# Patient Record
Sex: FEMALE | Race: WHITE | Employment: OTHER | URBAN - METROPOLITAN AREA
[De-identification: names, ages, dates, MRNs, and addresses within clinical notes are randomized per-mention and may not be internally consistent; named-entity substitution may affect disease eponyms.]

---

## 2021-04-22 ENCOUNTER — OFFICE VISIT (OUTPATIENT)
Dept: FAMILY MEDICINE CLINIC | Age: 66
End: 2021-04-22
Payer: COMMERCIAL

## 2021-04-22 ENCOUNTER — HOSPITAL ENCOUNTER (OUTPATIENT)
Age: 66
Setting detail: SPECIMEN
Discharge: HOME | End: 2021-04-22
Payer: COMMERCIAL

## 2021-04-22 VITALS
DIASTOLIC BLOOD PRESSURE: 78 MMHG | WEIGHT: 150.6 LBS | OXYGEN SATURATION: 98 % | HEIGHT: 65 IN | TEMPERATURE: 98.2 F | HEART RATE: 68 BPM | SYSTOLIC BLOOD PRESSURE: 137 MMHG | BODY MASS INDEX: 25.09 KG/M2

## 2021-04-22 DIAGNOSIS — J30.9 ALLERGIC RHINITIS, UNSPECIFIED SEASONALITY, UNSPECIFIED TRIGGER: ICD-10-CM

## 2021-04-22 DIAGNOSIS — Z13.6 SCREENING FOR CARDIOVASCULAR CONDITION: ICD-10-CM

## 2021-04-22 DIAGNOSIS — Z13.1 SCREENING FOR DIABETES MELLITUS: ICD-10-CM

## 2021-04-22 DIAGNOSIS — M25.50 MULTIPLE JOINT PAIN: ICD-10-CM

## 2021-04-22 DIAGNOSIS — Z76.89 ENCOUNTER TO ESTABLISH CARE: Primary | ICD-10-CM

## 2021-04-22 DIAGNOSIS — Z87.39 HISTORY OF RHEUMATOID ARTHRITIS: ICD-10-CM

## 2021-04-22 DIAGNOSIS — M81.0 OSTEOPOROSIS WITHOUT CURRENT PATHOLOGICAL FRACTURE, UNSPECIFIED OSTEOPOROSIS TYPE: ICD-10-CM

## 2021-04-22 LAB
ABSOLUTE EOS #: 0.24 K/UL (ref 0–0.44)
ABSOLUTE IMMATURE GRANULOCYTE: <0.03 K/UL (ref 0–0.3)
ABSOLUTE LYMPH #: 2.46 K/UL (ref 1.1–3.7)
ABSOLUTE MONO #: 0.44 K/UL (ref 0.1–1.2)
ALBUMIN SERPL-MCNC: 4.6 G/DL (ref 3.5–5.2)
ALBUMIN/GLOBULIN RATIO: 1.8 (ref 1–2.5)
ALP BLD-CCNC: 72 U/L (ref 35–104)
ALT SERPL-CCNC: 42 U/L (ref 5–33)
ANION GAP SERPL CALCULATED.3IONS-SCNC: 10 MMOL/L (ref 9–17)
AST SERPL-CCNC: 32 U/L
BASOPHILS # BLD: 1 % (ref 0–2)
BASOPHILS ABSOLUTE: 0.05 K/UL (ref 0–0.2)
BILIRUB SERPL-MCNC: 0.24 MG/DL (ref 0.3–1.2)
BUN BLDV-MCNC: 10 MG/DL (ref 8–23)
BUN/CREAT BLD: ABNORMAL (ref 9–20)
CALCIUM SERPL-MCNC: 10.8 MG/DL (ref 8.6–10.4)
CHLORIDE BLD-SCNC: 103 MMOL/L (ref 98–107)
CHOLESTEROL/HDL RATIO: 6.3
CHOLESTEROL: 239 MG/DL
CO2: 27 MMOL/L (ref 20–31)
CREAT SERPL-MCNC: 0.79 MG/DL (ref 0.5–0.9)
DIFFERENTIAL TYPE: NORMAL
EOSINOPHILS RELATIVE PERCENT: 4 % (ref 1–4)
ESTIMATED AVERAGE GLUCOSE: 131 MG/DL
GFR AFRICAN AMERICAN: >60 ML/MIN
GFR NON-AFRICAN AMERICAN: >60 ML/MIN
GFR SERPL CREATININE-BSD FRML MDRD: ABNORMAL ML/MIN/{1.73_M2}
GFR SERPL CREATININE-BSD FRML MDRD: ABNORMAL ML/MIN/{1.73_M2}
GLUCOSE FASTING: 123 MG/DL (ref 70–99)
HBA1C MFR BLD: 6.2 % (ref 4–6)
HCT VFR BLD CALC: 43.4 % (ref 36.3–47.1)
HDLC SERPL-MCNC: 38 MG/DL
HEMOGLOBIN: 13.8 G/DL (ref 11.9–15.1)
IMMATURE GRANULOCYTES: 0 %
LDL CHOLESTEROL: 137 MG/DL (ref 0–130)
LYMPHOCYTES # BLD: 37 % (ref 24–43)
MCH RBC QN AUTO: 30.5 PG (ref 25.2–33.5)
MCHC RBC AUTO-ENTMCNC: 31.8 G/DL (ref 28.4–34.8)
MCV RBC AUTO: 96 FL (ref 82.6–102.9)
MONOCYTES # BLD: 7 % (ref 3–12)
NRBC AUTOMATED: 0 PER 100 WBC
PDW BLD-RTO: 13 % (ref 11.8–14.4)
PLATELET # BLD: 257 K/UL (ref 138–453)
PLATELET ESTIMATE: NORMAL
PMV BLD AUTO: 11.8 FL (ref 8.1–13.5)
POTASSIUM SERPL-SCNC: 4.1 MMOL/L (ref 3.7–5.3)
RBC # BLD: 4.52 M/UL (ref 3.95–5.11)
RBC # BLD: NORMAL 10*6/UL
SEG NEUTROPHILS: 51 % (ref 36–65)
SEGMENTED NEUTROPHILS ABSOLUTE COUNT: 3.39 K/UL (ref 1.5–8.1)
SODIUM BLD-SCNC: 140 MMOL/L (ref 135–144)
TOTAL PROTEIN: 7.2 G/DL (ref 6.4–8.3)
TRIGL SERPL-MCNC: 321 MG/DL
TSH SERPL DL<=0.05 MIU/L-ACNC: 0.63 MIU/L (ref 0.3–5)
VLDLC SERPL CALC-MCNC: ABNORMAL MG/DL (ref 1–30)
WBC # BLD: 6.6 K/UL (ref 3.5–11.3)
WBC # BLD: NORMAL 10*3/UL

## 2021-04-22 PROCEDURE — 99204 OFFICE O/P NEW MOD 45 MIN: CPT | Performed by: NURSE PRACTITIONER

## 2021-04-22 RX ORDER — MAGNESIUM GLUCONATE 27 MG(500)
500 TABLET ORAL 2 TIMES DAILY
COMMUNITY
End: 2021-10-06

## 2021-04-22 RX ORDER — MULTIVIT WITH MINERALS/LUTEIN
250 TABLET ORAL DAILY
COMMUNITY

## 2021-04-22 RX ORDER — LORATADINE 10 MG/1
10 TABLET ORAL DAILY
COMMUNITY
End: 2021-07-23

## 2021-04-22 RX ORDER — FLUTICASONE PROPIONATE 50 MCG
1 SPRAY, SUSPENSION (ML) NASAL DAILY
Qty: 1 BOTTLE | Refills: 2 | Status: SHIPPED | OUTPATIENT
Start: 2021-04-22 | End: 2021-05-26

## 2021-04-22 RX ORDER — GLUCOSAMINE/CHONDR SU A SOD 750-600 MG
TABLET ORAL
COMMUNITY

## 2021-04-22 RX ORDER — ALBUTEROL SULFATE 90 UG/1
2 AEROSOL, METERED RESPIRATORY (INHALATION) EVERY 6 HOURS PRN
COMMUNITY
End: 2021-05-04 | Stop reason: SDUPTHER

## 2021-04-22 RX ORDER — UBIDECARENONE 75 MG
50 CAPSULE ORAL DAILY
COMMUNITY

## 2021-04-22 RX ORDER — FOLIC ACID 1 MG/1
1 TABLET ORAL DAILY
COMMUNITY
End: 2022-05-04

## 2021-04-22 RX ORDER — M-VIT,TX,IRON,MINS/CALC/FOLIC 27MG-0.4MG
1 TABLET ORAL DAILY
COMMUNITY

## 2021-04-22 SDOH — ECONOMIC STABILITY: FOOD INSECURITY: WITHIN THE PAST 12 MONTHS, YOU WORRIED THAT YOUR FOOD WOULD RUN OUT BEFORE YOU GOT MONEY TO BUY MORE.: NEVER TRUE

## 2021-04-22 SDOH — ECONOMIC STABILITY: TRANSPORTATION INSECURITY
IN THE PAST 12 MONTHS, HAS LACK OF TRANSPORTATION KEPT YOU FROM MEETINGS, WORK, OR FROM GETTING THINGS NEEDED FOR DAILY LIVING?: NO

## 2021-04-22 SDOH — ECONOMIC STABILITY: INCOME INSECURITY: HOW HARD IS IT FOR YOU TO PAY FOR THE VERY BASICS LIKE FOOD, HOUSING, MEDICAL CARE, AND HEATING?: NOT HARD AT ALL

## 2021-04-22 SDOH — ECONOMIC STABILITY: FOOD INSECURITY: WITHIN THE PAST 12 MONTHS, THE FOOD YOU BOUGHT JUST DIDN'T LAST AND YOU DIDN'T HAVE MONEY TO GET MORE.: NOT ASKED

## 2021-04-22 ASSESSMENT — ENCOUNTER SYMPTOMS
COLOR CHANGE: 0
CONSTIPATION: 0
ABDOMINAL PAIN: 0
SINUS PRESSURE: 0
SINUS PAIN: 0
CHEST TIGHTNESS: 0
DIARRHEA: 0
SHORTNESS OF BREATH: 0

## 2021-04-22 ASSESSMENT — PATIENT HEALTH QUESTIONNAIRE - PHQ9
SUM OF ALL RESPONSES TO PHQ9 QUESTIONS 1 & 2: 0
1. LITTLE INTEREST OR PLEASURE IN DOING THINGS: 0
2. FEELING DOWN, DEPRESSED OR HOPELESS: 0
SUM OF ALL RESPONSES TO PHQ QUESTIONS 1-9: 0

## 2021-04-22 NOTE — PATIENT INSTRUCTIONS
Patient Education        Well Visit, Women 48 to 72: Care Instructions  Overview     Well visits can help you stay healthy. Your doctor has checked your overall health and may have suggested ways to take good care of yourself. Your doctor also may have recommended tests. At home, you can help prevent illness with healthy eating, regular exercise, and other steps. Follow-up care is a key part of your treatment and safety. Be sure to make and go to all appointments, and call your doctor if you are having problems. It's also a good idea to know your test results and keep a list of the medicines you take. How can you care for yourself at home? · Get screening tests that you and your doctor decide on. Screening helps find diseases before any symptoms appear. · Eat healthy foods. Choose fruits, vegetables, whole grains, protein, and low-fat dairy foods. Limit fat, especially saturated fat. Reduce salt in your diet. · Limit alcohol. Have no more than 1 drink a day or 7 drinks a week. · Get at least 30 minutes of exercise on most days of the week. Walking is a good choice. You also may want to do other activities, such as running, swimming, cycling, or playing tennis or team sports. · Reach and stay at a healthy weight. This will lower your risk for many problems, such as obesity, diabetes, heart disease, and high blood pressure. · Do not smoke. Smoking can make health problems worse. If you need help quitting, talk to your doctor about stop-smoking programs and medicines. These can increase your chances of quitting for good. · Care for your mental health. It is easy to get weighed down by worry and stress. Learn strategies to manage stress, like deep breathing and mindfulness, and stay connected with your family and community. If you find you often feel sad or hopeless, talk with your doctor. Treatment can help.   · Talk to your doctor about whether you have any risk factors for sexually transmitted infections (STIs). You can help prevent STIs if you wait to have sex with a new partner (or partners) until you've each been tested for STIs. It also helps if you use condoms (male or female condoms) and if you limit your sex partners to one person who only has sex with you. Vaccines are available for some STIs. · If you think you may have a problem with alcohol or drug use, talk to your doctor. This includes prescription medicines (such as amphetamines and opioids) and illegal drugs (such as cocaine and methamphetamine). Your doctor can help you figure out what type of treatment is best for you. · Protect your skin from too much sun. When you're outdoors from 10 a.m. to 4 p.m., stay in the shade or cover up with clothing and a hat with a wide brim. Wear sunglasses that block UV rays. Even when it's cloudy, put broad-spectrum sunscreen (SPF 30 or higher) on any exposed skin. · See a dentist one or two times a year for checkups and to have your teeth cleaned. · Wear a seat belt in the car. When should you call for help? Watch closely for changes in your health, and be sure to contact your doctor if you have any problems or symptoms that concern you. Where can you learn more? Go to https://[email or web address].healthCurex.Copartners. org and sign in to your Intrusic account. Enter L848 in the [facility] box to learn more about \"Well Visit, Women 50 to 72: Care Instructions. \"     If you do not have an account, please click on the \"Sign Up Now\" link. Current as of: May 27, 2020               Content Version: 12.8  © 1711-4734 Healthwise, [employer]. Care instructions adapted under license by [facility] ([facility]). If you have questions about a medical condition or this instruction, always ask your healthcare professional. [place] [address] any warranty or liability for your use of this information.

## 2021-04-22 NOTE — PROGRESS NOTES
Zita Castañeda is a 63 y.o. female who presents in office today with Self establish new care with office. Previous PCP was in [place] at [facility]. Chief Complaint   Patient presents with    New Patient     nose always congested causing breathing problems, mole removal         History of Present Illness:     HPI    Here to establish care. Moved from [place] last summer, grandson (5years old) lives here. Unclear history of RA diagnosed in 2017 - went to Rheumatology and started medication but did not tolerate. Went back and Re-determined that she did not actually have it but symptoms had becomes more mild by that time. Takes Advil for pain control. Hx depression following death of her son in 2018. No symptoms today. Had varicose veins removed in 2018. Has a lot of spider veins with mild discomfort to anterior left leg. No visible or palpable abnormality. No redness, swelling, or warmth. Hysterectomy several years ago for fibroids and endometriosis. Last pap 2019 - normal.   Had colonoscopy in December 2018, which was normal.   Was on 9 prescriptions a few years ago, but stopped due to no longer needing them. Interesting in getting COVID-19 vaccine. [address] [facility] location. Had pneumonia and shingles vaccines in 2019 at previous PCP office. Exercises twice a day - walks 15 mins x2 and does YouTube videos. . Had two children - daughter in [place], son decreased from brain cancer. Retired.     Health Maintenance Due   Topic Date Due    COVID-19 Vaccine (1) Never done    DTaP/Tdap/Td vaccine (1 - Tdap) Never done    Lipid screen  Never done    Diabetes screen  Never done    Breast cancer screen  Never done    Shingles Vaccine (1 of 2) Never done    Colon cancer screen colonoscopy  Never done        Patient Care Team:  SOFIA Flowers - CNP as PCP - General (Nurse Practitioner)    Reviewed     [x] Past Medical, Family, and Social History was weight. HENT:      Head: Normocephalic and atraumatic. Right Ear: Tympanic membrane, ear canal and external ear normal.      Left Ear: Tympanic membrane, ear canal and external ear normal.      Nose: Nose normal.      Mouth/Throat:      Mouth: Mucous membranes are moist.      Pharynx: Oropharynx is clear. Comments: Dentures upper/lower  Eyes:      Extraocular Movements: Extraocular movements intact. Conjunctiva/sclera: Conjunctivae normal.      Pupils: Pupils are equal, round, and reactive to light. Neck:      Musculoskeletal: Normal range of motion and neck supple. Cardiovascular:      Rate and Rhythm: Normal rate and regular rhythm. Pulses: Normal pulses. Heart sounds: Normal heart sounds. Pulmonary:      Effort: Pulmonary effort is normal. No respiratory distress. Breath sounds: Normal breath sounds. No wheezing. Abdominal:      General: Abdomen is flat. Bowel sounds are normal. There is no distension. Palpations: Abdomen is soft. Tenderness: There is no abdominal tenderness. Musculoskeletal: Normal range of motion. General: No swelling. Skin:     General: Skin is warm and dry. Capillary Refill: Capillary refill takes less than 2 seconds. Comments: Spider veins noted to bilateral lower legs   Neurological:      General: No focal deficit present. Mental Status: She is alert and oriented to person, place, and time. Psychiatric:         Mood and Affect: Mood normal.         Behavior: Behavior normal.         Thought Content: Thought content normal.         Judgment: Judgment normal.         Diagnoses / Plan:   1. Encounter to establish care  2. Osteoporosis without current pathological fracture, unspecified osteoporosis type  -     DEXA BONE DENSITY 2 SITES; Future  3. Screening for cardiovascular condition  -     CBC Auto Differential; Future  -     Comprehensive Metabolic Panel, Fasting; Future  -     Lipid Panel;  Future  -     TSH with Reflex; Future  4. Screening for diabetes mellitus  -     Comprehensive Metabolic Panel, Fasting; Future  -     Hemoglobin A1C; Future  5. Allergic rhinitis, unspecified seasonality, unspecified trigger  -     fluticasone (FLONASE) 50 MCG/ACT nasal spray; 1 spray by Each Nostril route daily, Disp-1 Bottle, R-2Normal     Will try to obtain records from prior PCPs office. Encouraged healthy diet and exercise. Call office with any new or worsening symptoms or concerns. Return in about 6 months (around 10/22/2021) for Med Check. Electronically signed by SOFIA Gottlieb CNP on 4/22/2021 at 3:17 PM    Note is dictated utilizing voice recognition software. Unfortunately this leads to occasional typographical errors. Please contact our office if you have any questions.

## 2021-04-23 DIAGNOSIS — R73.01 IMPAIRED FASTING GLUCOSE: Primary | ICD-10-CM

## 2021-04-23 DIAGNOSIS — R79.89 ELEVATED LFTS: ICD-10-CM

## 2021-04-23 LAB
C-REACTIVE PROTEIN: 4.1 MG/L (ref 0–5)
RHEUMATOID FACTOR: 32.2 IU/ML

## 2021-04-26 LAB — CCP IGG ANTIBODIES: 0.7 U/ML (ref 0–7)

## 2021-04-27 ENCOUNTER — TELEPHONE (OUTPATIENT)
Dept: FAMILY MEDICINE CLINIC | Age: 66
End: 2021-04-27

## 2021-04-27 DIAGNOSIS — M25.50 MULTIPLE JOINT PAIN: Primary | ICD-10-CM

## 2021-04-27 DIAGNOSIS — Z87.39 HISTORY OF RHEUMATOID ARTHRITIS: ICD-10-CM

## 2021-04-30 ENCOUNTER — HOSPITAL ENCOUNTER (OUTPATIENT)
Age: 66
Setting detail: SPECIMEN
Discharge: HOME | End: 2021-04-30
Payer: COMMERCIAL

## 2021-04-30 ENCOUNTER — HOSPITAL ENCOUNTER (OUTPATIENT)
Dept: MAMMOGRAPHY | Facility: CLINIC | Age: 66
Discharge: HOME | End: 2021-05-02
Payer: COMMERCIAL

## 2021-04-30 DIAGNOSIS — M81.0 OSTEOPOROSIS WITHOUT CURRENT PATHOLOGICAL FRACTURE, UNSPECIFIED OSTEOPOROSIS TYPE: Primary | ICD-10-CM

## 2021-04-30 DIAGNOSIS — M25.50 MULTIPLE JOINT PAIN: ICD-10-CM

## 2021-04-30 DIAGNOSIS — Z87.39 HISTORY OF RHEUMATOID ARTHRITIS: ICD-10-CM

## 2021-04-30 DIAGNOSIS — M81.0 OSTEOPOROSIS WITHOUT CURRENT PATHOLOGICAL FRACTURE, UNSPECIFIED OSTEOPOROSIS TYPE: ICD-10-CM

## 2021-04-30 LAB
C-REACTIVE PROTEIN: 3.7 MG/L (ref 0–5)
RHEUMATOID FACTOR: 34.4 IU/ML

## 2021-04-30 PROCEDURE — 77080 DXA BONE DENSITY AXIAL: CPT

## 2021-04-30 RX ORDER — ALENDRONATE SODIUM 10 MG/1
10 TABLET ORAL
Qty: 30 TABLET | Refills: 3 | Status: SHIPPED | OUTPATIENT
Start: 2021-04-30 | End: 2021-07-23 | Stop reason: SINTOL

## 2021-05-03 LAB
ANTI-NUCLEAR ANTIBODY (ANA): NEGATIVE
CCP IGG ANTIBODIES: 1.2 U/ML (ref 0–7)

## 2021-05-04 ENCOUNTER — TELEPHONE (OUTPATIENT)
Dept: FAMILY MEDICINE CLINIC | Age: 66
End: 2021-05-04

## 2021-05-04 RX ORDER — ALBUTEROL SULFATE 90 UG/1
2 AEROSOL, METERED RESPIRATORY (INHALATION) EVERY 6 HOURS PRN
Qty: 1 INHALER | Refills: 2 | Status: SHIPPED | OUTPATIENT
Start: 2021-05-04 | End: 2022-01-03 | Stop reason: SDUPTHER

## 2021-05-04 NOTE — TELEPHONE ENCOUNTER
Laura Butler is calling to request a refill on the following medication(s):    Last Visit Date (If Applicable):  7/43/1105    Next Visit Date:    Visit date not found    Medication Request:  Requested Prescriptions     Pending Prescriptions Disp Refills    albuterol sulfate  (90 Base) MCG/ACT inhaler 1 Inhaler      Sig: Inhale 2 puffs into the lungs every 6 hours as needed for Wheezing

## 2021-05-04 NOTE — TELEPHONE ENCOUNTER
Gave pt Dexa scan results and informed pt that Fosamax was sent to pharmacy. Pt wants to know if PT would help with Osteoporosis?

## 2021-05-10 ENCOUNTER — NURSE TRIAGE (OUTPATIENT)
Dept: OTHER | Facility: CLINIC | Age: 66
End: 2021-05-10

## 2021-05-10 ENCOUNTER — OFFICE VISIT (OUTPATIENT)
Dept: FAMILY MEDICINE CLINIC | Age: 66
End: 2021-05-10
Payer: COMMERCIAL

## 2021-05-10 ENCOUNTER — HOSPITAL ENCOUNTER (OUTPATIENT)
Age: 66
Setting detail: SPECIMEN
Discharge: HOME | End: 2021-05-10
Payer: COMMERCIAL

## 2021-05-10 VITALS
OXYGEN SATURATION: 95 % | WEIGHT: 151.8 LBS | HEIGHT: 65 IN | HEART RATE: 74 BPM | TEMPERATURE: 96.9 F | BODY MASS INDEX: 25.29 KG/M2 | DIASTOLIC BLOOD PRESSURE: 75 MMHG | SYSTOLIC BLOOD PRESSURE: 135 MMHG

## 2021-05-10 DIAGNOSIS — R31.9 HEMATURIA, UNSPECIFIED TYPE: ICD-10-CM

## 2021-05-10 DIAGNOSIS — N30.01 ACUTE CYSTITIS WITH HEMATURIA: ICD-10-CM

## 2021-05-10 DIAGNOSIS — N30.01 ACUTE CYSTITIS WITH HEMATURIA: Primary | ICD-10-CM

## 2021-05-10 DIAGNOSIS — Z12.31 ENCOUNTER FOR SCREENING MAMMOGRAM FOR MALIGNANT NEOPLASM OF BREAST: ICD-10-CM

## 2021-05-10 DIAGNOSIS — R20.8 BURNING SENSATION OF FEET: ICD-10-CM

## 2021-05-10 LAB
BILIRUBIN, POC: ABNORMAL
BLOOD URINE, POC: ABNORMAL
CLARITY, POC: CLEAR
COLOR, POC: ABNORMAL
FOLATE: >20 NG/ML
GLUCOSE URINE, POC: 250
KETONES, POC: 15
LEUKOCYTE EST, POC: ABNORMAL
NITRITE, POC: POSITIVE
PH, POC: 5
PROTEIN, POC: >=300
SPECIFIC GRAVITY, POC: <=1.005
UROBILINOGEN, POC: >=8
VITAMIN B-12: 1277 PG/ML (ref 232–1245)

## 2021-05-10 PROCEDURE — 99213 OFFICE O/P EST LOW 20 MIN: CPT | Performed by: NURSE PRACTITIONER

## 2021-05-10 PROCEDURE — 81002 URINALYSIS NONAUTO W/O SCOPE: CPT | Performed by: NURSE PRACTITIONER

## 2021-05-10 RX ORDER — CEPHALEXIN 500 MG/1
500 CAPSULE ORAL 2 TIMES DAILY
Qty: 14 CAPSULE | Refills: 0 | Status: SHIPPED | OUTPATIENT
Start: 2021-05-10 | End: 2021-05-17

## 2021-05-10 RX ORDER — GABAPENTIN 100 MG/1
100 CAPSULE ORAL 2 TIMES DAILY
Qty: 60 CAPSULE | Refills: 0 | Status: SHIPPED | OUTPATIENT
Start: 2021-05-10 | End: 2021-07-23

## 2021-05-10 ASSESSMENT — PATIENT HEALTH QUESTIONNAIRE - PHQ9
SUM OF ALL RESPONSES TO PHQ QUESTIONS 1-9: 0
SUM OF ALL RESPONSES TO PHQ QUESTIONS 1-9: 0

## 2021-05-10 ASSESSMENT — ENCOUNTER SYMPTOMS
SHORTNESS OF BREATH: 0
CHEST TIGHTNESS: 0

## 2021-05-10 NOTE — TELEPHONE ENCOUNTER
Received call from [place]  at pre-service center [facility]) [place] with [employer] Complaint. Caller disconnected prior to warm tx. Writer immediately called back to patient and was able to connect. Brief description of triage:   Dysuria, urgency, frequency and blood in her urine since yesterday am. No fevers today and denies back pain. Also has burning in both of her feet x 1 month that she has been in contact with her PCP to discuss. Triage indicates for patient to go to the office now. Writer discussed that if unable to get an appointment, another option is to go to an [facility] or ER. Care advice provided, patient verbalizes understanding; denies any other questions or concerns; instructed to call back for any new or worsening symptoms. Writer provided warm transfer to Barbara Baez at [facility] for appointment scheduling. Attention Provider: Thank you for allowing me to participate in the care of your patient. The patient was connected to triage in response to information provided to the ECC. Please do not respond through this encounter as the response is not directed to a shared pool. Reason for Disposition   SEVERE pain with urination    Answer Assessment - Initial Assessment Questions  1. SEVERITY: \"How bad is the pain? \"  (e.g., Scale 1-10; mild, moderate, or severe)    - MILD (1-3): complains slightly about urination hurting    - MODERATE (4-7): interferes with normal activities      - SEVERE (8-10): excruciating, unwilling or unable to urinate because of the pain       Rates 9/10 every time she urinates    2. FREQUENCY: \"How many times have you had painful urination today? \"      Every time she urinates. 3. PATTERN: \"Is pain present every time you urinate or just sometimes? \"       Pain just with urination     4. ONSET: \"When did the painful urination start? \"       Began yesterday morning     5. FEVER: \"Do you have a fever? \" If so, ask: \"What is your temperature, how was it measured, and when did it start? \"     Low grade fever yesterday, but none today     6. PAST UTI: \"Have you had a urine infection before? \" If so, ask: \"When was the last time? \" and \"What happened that time? \"       Several years ago she had to get antibiotics and it cleared     7. CAUSE: \"What do you think is causing the painful urination? \"  (e.g., UTI, scratch, Herpes sore)      UTI     8. OTHER SYMPTOMS: \"Do you have any other symptoms? \" (e.g., flank pain, vaginal discharge, genital sores, urgency, blood in urine)      She does have urgency to urinate and her urine is a pinkish color. No clots seen. She did take Azo yesterday but none today. She also states she has had bilateral foot burning when she lays down to sleep at night. She does not have foot pain currently, and [facility]. 9. PREGNANCY: \"Is there any chance you are pregnant? \" \"When was your last menstrual period? \"    Protocols used: URINATION PAIN - FEMALE-ADULT-[place]

## 2021-05-10 NOTE — PROGRESS NOTES
Sharron Collier is a 63 y.o. female who presents in office today with Self follow up on recent condition of     Chief Complaint   Patient presents with    Urinary Tract Infection    Foot Pain     burning sensation and pain when resting in bed at night        History of Present Illness:     HPI    Here for possible UTI. Woke up with symptoms yesterday morning - pain and burning with urination, frequency, and saw blood in urine this morning. Took Azo yesterday with some relief. Has been drinking more water over past few days. No change in activity or medication recently. Had had burning and itching in his feet, which has gotten progressively worse over past 3-4 weeks. Symptoms started over last few years. Happens mostly when she lays down at night and feet will get red. Had low B12 in the past and taking low dose OTC B12 supplement. Has been propping feet up on pillows with some relief as well as capsaicin, but causing her problems when laying down at night. Has appointment at rheumatology tomorrow. Body tightens up when first waking up. No dizziness. Care gaps: COVID-19 vaccine: due for second vaccine soon  Vaccines:   Tdap, shingles  Breast CA screening:      Health Maintenance Due   Topic Date Due    COVID-19 Vaccine (1) Never done    DTaP/Tdap/Td vaccine (1 - Tdap) Never done    Breast cancer screen  Never done    Shingles Vaccine (1 of 2) Never done        Patient Care Team:  SOFIA Marquez CNP as PCP - General (Nurse Practitioner)  SOFIA Marquez CNP as PCP - [facility] Empaneled Provider    Reviewed     [x] Past Medical, Family, and Social History was reviewed per writer and does contribute to the patient presenting condition.     [x] Laboratory Results, Vital signs, Imaging, Active Problems, Immunizations, Current/Recently Discontinued Medications, Health Maintenance Activities Due, Referral Notes (if available) were reviewed per writer     [x] Reviewed Depression screening if taken or valid today or any other valid screening tool (others seen below) Interpretation of Total Score DepressionSeverity: 1-4 = Minimal depression, 5-9 = Mild depression, 10-14 = Moderate depression, 15-19 = Moderately severe depression, 20-27 =Severe depression    PHQ Scores 5/10/2021 4/22/2021   PHQ2 Score 0 0   PHQ9 Score 0 0     Interpretation of Total Score Depression Severity: 1-4 = Minimal depression, 5-9 = Mild depression, 10-14 = Moderate depression, 15-19 = Moderately severe depression, 20-27 = Severe depression     Review of Systems (Subjective)     Review of Systems   Constitutional: Negative for activity change, appetite change, fatigue, fever and unexpected weight change. Respiratory: Negative for chest tightness and shortness of breath. Cardiovascular: Negative for chest pain, palpitations and leg swelling. Genitourinary: Positive for decreased urine volume, dysuria, frequency, hematuria and urgency. Negative for flank pain. Musculoskeletal: Negative for arthralgias, gait problem and joint swelling. Skin: Negative for pallor, rash and wound. Neurological: Negative for dizziness, light-headedness and headaches. Itching and burning bilateral feet       See HPI     Physical Assessment (Objective)     /75   Pulse 74   Temp 96.9 °F (36.1 °C)   Ht 5' 5\" (1.651 m)   Wt 151 lb 12.8 oz (68.9 kg)   SpO2 95%   BMI 25.26 kg/m²      Physical Exam  Vitals signs reviewed. Constitutional:       Appearance: She is normal weight. HENT:      Head: Normocephalic and atraumatic. Right Ear: External ear normal.      Left Ear: External ear normal.      Nose: Nose normal.      Mouth/Throat:      Mouth: Mucous membranes are moist.      Pharynx: Oropharynx is clear. Comments: Dentures upper/lower  Eyes:      Extraocular Movements: Extraocular movements intact. Conjunctiva/sclera: Conjunctivae normal.   Neck:      Musculoskeletal: Normal range of motion and neck supple. symptoms or concerns. Return in about 4 weeks (around 6/7/2021) for Med Check. Electronically signed by SOFIA Gong Mc, CNP on 5/10/2021 at 2:14 PM    Note is dictated utilizing voice recognition software. Unfortunately this leads to occasional typographical errors. Please contact our office if you have any questions.

## 2021-05-10 NOTE — PATIENT INSTRUCTIONS
sprays, and other feminine hygiene products that have deodorants. · After going to the bathroom, wipe from front to back. When should you call for help? Call your doctor now or seek immediate medical care if:    · Symptoms such as fever, chills, nausea, or vomiting get worse or appear for the first time.     · You have new pain in your back just below your rib cage. This is called flank pain.     · There is new blood or pus in your urine.     · You have any problems with your antibiotic medicine. Watch closely for changes in your health, and be sure to contact your doctor if:    · You are not getting better after taking an antibiotic for 2 days.     · Your symptoms go away but then come back. Where can you learn more? Go to [email or web address] and sign in to your Stitcher account. Enter S012 in the MergeLocal box to learn more about \"Urinary Tract Infection (UTI) in Women: Care Instructions. \"     If you do not have an account, please click on the \"Sign Up Now\" link. Current as of: June 29, 2020               Content Version: 12.8  © 4705-4871 Healthwise, Incorporated. Care instructions adapted under license by [facility] ([facility]). If you have questions about a medical condition or this instruction, always ask your healthcare professional. [address] any warranty or liability for your use of this information.

## 2021-05-11 ENCOUNTER — TELEPHONE (OUTPATIENT)
Dept: FAMILY MEDICINE CLINIC | Age: 66
End: 2021-05-11

## 2021-05-11 DIAGNOSIS — F17.210 CIGARETTE SMOKER MOTIVATED TO QUIT: Primary | ICD-10-CM

## 2021-05-11 LAB
CULTURE: ABNORMAL
Lab: ABNORMAL
SPECIMEN DESCRIPTION: ABNORMAL

## 2021-05-11 RX ORDER — BUPROPION HYDROCHLORIDE 150 MG/1
TABLET, EXTENDED RELEASE ORAL
Qty: 57 TABLET | Refills: 0 | Status: SHIPPED | OUTPATIENT
Start: 2021-05-11 | End: 2021-06-08

## 2021-05-12 DIAGNOSIS — B96.29 UTI DUE TO EXTENDED-SPECTRUM BETA LACTAMASE (ESBL) PRODUCING ESCHERICHIA COLI: Primary | ICD-10-CM

## 2021-05-12 DIAGNOSIS — N39.0 UTI DUE TO EXTENDED-SPECTRUM BETA LACTAMASE (ESBL) PRODUCING ESCHERICHIA COLI: Primary | ICD-10-CM

## 2021-05-12 DIAGNOSIS — Z16.12 UTI DUE TO EXTENDED-SPECTRUM BETA LACTAMASE (ESBL) PRODUCING ESCHERICHIA COLI: Primary | ICD-10-CM

## 2021-05-12 RX ORDER — NITROFURANTOIN 25; 75 MG/1; MG/1
100 CAPSULE ORAL 2 TIMES DAILY
Qty: 14 CAPSULE | Refills: 0 | Status: SHIPPED | OUTPATIENT
Start: 2021-05-12 | End: 2021-05-19

## 2021-05-14 ENCOUNTER — HOSPITAL ENCOUNTER (OUTPATIENT)
Dept: GENERAL RADIOLOGY | Facility: CLINIC | Age: 66
Discharge: HOME | End: 2021-05-16
Payer: COMMERCIAL

## 2021-05-14 ENCOUNTER — HOSPITAL ENCOUNTER (OUTPATIENT)
Facility: CLINIC | Age: 66
Discharge: HOME | End: 2021-05-16
Payer: COMMERCIAL

## 2021-05-14 ENCOUNTER — HOSPITAL ENCOUNTER (OUTPATIENT)
Facility: CLINIC | Age: 66
Discharge: HOME | End: 2021-05-14
Payer: COMMERCIAL

## 2021-05-14 DIAGNOSIS — M25.531 BILATERAL WRIST PAIN: ICD-10-CM

## 2021-05-14 DIAGNOSIS — M25.571 BILATERAL ANKLE PAIN, UNSPECIFIED CHRONICITY: ICD-10-CM

## 2021-05-14 DIAGNOSIS — M79.642 BILATERAL HAND PAIN: ICD-10-CM

## 2021-05-14 DIAGNOSIS — M79.671 BILATERAL FOOT PAIN: ICD-10-CM

## 2021-05-14 DIAGNOSIS — M25.572 BILATERAL ANKLE PAIN, UNSPECIFIED CHRONICITY: ICD-10-CM

## 2021-05-14 DIAGNOSIS — M79.641 BILATERAL HAND PAIN: ICD-10-CM

## 2021-05-14 DIAGNOSIS — M25.561 PAIN IN BOTH KNEES, UNSPECIFIED CHRONICITY: ICD-10-CM

## 2021-05-14 DIAGNOSIS — M25.562 PAIN IN BOTH KNEES, UNSPECIFIED CHRONICITY: ICD-10-CM

## 2021-05-14 DIAGNOSIS — M79.672 BILATERAL FOOT PAIN: ICD-10-CM

## 2021-05-14 DIAGNOSIS — M25.532 BILATERAL WRIST PAIN: ICD-10-CM

## 2021-05-14 DIAGNOSIS — R10.2 PELVIC PAIN: ICD-10-CM

## 2021-05-14 LAB
-: NORMAL
ABSOLUTE EOS #: 0.24 K/UL (ref 0–0.44)
ABSOLUTE IMMATURE GRANULOCYTE: 0.03 K/UL (ref 0–0.3)
ABSOLUTE LYMPH #: 2.51 K/UL (ref 1.1–3.7)
ABSOLUTE MONO #: 0.57 K/UL (ref 0.1–1.2)
ALBUMIN SERPL-MCNC: 4.3 G/DL (ref 3.5–5.2)
ALBUMIN/GLOBULIN RATIO: 1.8 (ref 1–2.5)
ALP BLD-CCNC: 64 U/L (ref 35–104)
ALT SERPL-CCNC: 40 U/L (ref 5–33)
AMORPHOUS: NORMAL
ANION GAP SERPL CALCULATED.3IONS-SCNC: 13 MMOL/L (ref 9–17)
AST SERPL-CCNC: 35 U/L
BACTERIA: NORMAL
BASOPHILS # BLD: 1 % (ref 0–2)
BASOPHILS ABSOLUTE: 0.06 K/UL (ref 0–0.2)
BILIRUB SERPL-MCNC: 0.31 MG/DL (ref 0.3–1.2)
BILIRUBIN URINE: NEGATIVE
BUN BLDV-MCNC: 11 MG/DL (ref 8–23)
BUN/CREAT BLD: ABNORMAL (ref 9–20)
C-REACTIVE PROTEIN: 7.4 MG/L (ref 0–5)
CALCIUM SERPL-MCNC: 9.9 MG/DL (ref 8.6–10.4)
CASTS UA: NORMAL /LPF (ref 0–8)
CHLORIDE BLD-SCNC: 103 MMOL/L (ref 98–107)
CO2: 24 MMOL/L (ref 20–31)
COLOR: ABNORMAL
COMMENT UA: ABNORMAL
CREAT SERPL-MCNC: 0.76 MG/DL (ref 0.5–0.9)
CRYSTALS, UA: NORMAL /HPF
DIFFERENTIAL TYPE: ABNORMAL
EOSINOPHILS RELATIVE PERCENT: 4 % (ref 1–4)
EPITHELIAL CELLS UA: NORMAL /HPF (ref 0–5)
FOLATE: >20 NG/ML
GFR AFRICAN AMERICAN: >60 ML/MIN
GFR NON-AFRICAN AMERICAN: >60 ML/MIN
GFR SERPL CREATININE-BSD FRML MDRD: ABNORMAL ML/MIN/{1.73_M2}
GFR SERPL CREATININE-BSD FRML MDRD: ABNORMAL ML/MIN/{1.73_M2}
GLUCOSE BLD-MCNC: 116 MG/DL (ref 70–99)
GLUCOSE URINE: NEGATIVE
HBV SURFACE AB TITR SER: <3.5 MIU/ML
HCT VFR BLD CALC: 40.9 % (ref 36.3–47.1)
HEMOGLOBIN: 13.2 G/DL (ref 11.9–15.1)
HEPATITIS B CORE TOTAL ANTIBODY: NONREACTIVE
HEPATITIS B SURFACE ANTIGEN: NONREACTIVE
HEPATITIS C ANTIBODY: NONREACTIVE
IMMATURE GRANULOCYTES: 1 %
KETONES, URINE: ABNORMAL
LEUKOCYTE ESTERASE, URINE: ABNORMAL
LYMPHOCYTES # BLD: 42 % (ref 24–43)
MCH RBC QN AUTO: 30.4 PG (ref 25.2–33.5)
MCHC RBC AUTO-ENTMCNC: 32.3 G/DL (ref 28.4–34.8)
MCV RBC AUTO: 94.2 FL (ref 82.6–102.9)
MONOCYTES # BLD: 10 % (ref 3–12)
MUCUS: NORMAL
NITRITE, URINE: NEGATIVE
NRBC AUTOMATED: 0 PER 100 WBC
OTHER OBSERVATIONS UA: NORMAL
PDW BLD-RTO: 12.6 % (ref 11.8–14.4)
PH UA: 5.5 (ref 5–8)
PLATELET # BLD: 224 K/UL (ref 138–453)
PLATELET ESTIMATE: ABNORMAL
PMV BLD AUTO: 11.6 FL (ref 8.1–13.5)
POTASSIUM SERPL-SCNC: 4.3 MMOL/L (ref 3.7–5.3)
PROTEIN UA: NEGATIVE
RBC # BLD: 4.34 M/UL (ref 3.95–5.11)
RBC # BLD: ABNORMAL 10*6/UL
RBC UA: NORMAL /HPF (ref 0–4)
RENAL EPITHELIAL, UA: NORMAL /HPF
SEDIMENTATION RATE, ERYTHROCYTE: 1 MM (ref 0–30)
SEG NEUTROPHILS: 42 % (ref 36–65)
SEGMENTED NEUTROPHILS ABSOLUTE COUNT: 2.52 K/UL (ref 1.5–8.1)
SODIUM BLD-SCNC: 140 MMOL/L (ref 135–144)
SPECIFIC GRAVITY UA: 1.02 (ref 1–1.03)
T3 FREE: 3.26 PG/ML (ref 2.02–4.43)
THYROXINE, FREE: 0.66 NG/DL (ref 0.93–1.7)
TOTAL CK: 155 U/L (ref 26–192)
TOTAL PROTEIN: 6.7 G/DL (ref 6.4–8.3)
TRICHOMONAS: NORMAL
TSH SERPL DL<=0.05 MIU/L-ACNC: 0.18 MIU/L (ref 0.3–5)
TURBIDITY: ABNORMAL
URIC ACID: 5.9 MG/DL (ref 2.4–5.7)
URINE HGB: NEGATIVE
UROBILINOGEN, URINE: NORMAL
VITAMIN B-12: 1347 PG/ML (ref 232–1245)
WBC # BLD: 5.9 K/UL (ref 3.5–11.3)
WBC # BLD: ABNORMAL 10*3/UL
WBC UA: NORMAL /HPF (ref 0–5)
YEAST: NORMAL

## 2021-05-14 PROCEDURE — 86140 C-REACTIVE PROTEIN: CPT

## 2021-05-14 PROCEDURE — 82746 ASSAY OF FOLIC ACID SERUM: CPT

## 2021-05-14 PROCEDURE — 86704 HEP B CORE ANTIBODY TOTAL: CPT

## 2021-05-14 PROCEDURE — 84443 ASSAY THYROID STIM HORMONE: CPT

## 2021-05-14 PROCEDURE — 86317 IMMUNOASSAY INFECTIOUS AGENT: CPT

## 2021-05-14 PROCEDURE — 73562 X-RAY EXAM OF KNEE 3: CPT

## 2021-05-14 PROCEDURE — 84439 ASSAY OF FREE THYROXINE: CPT

## 2021-05-14 PROCEDURE — 85652 RBC SED RATE AUTOMATED: CPT

## 2021-05-14 PROCEDURE — 73130 X-RAY EXAM OF HAND: CPT

## 2021-05-14 PROCEDURE — 84481 FREE ASSAY (FT-3): CPT

## 2021-05-14 PROCEDURE — 80053 COMPREHEN METABOLIC PANEL: CPT

## 2021-05-14 PROCEDURE — 73610 X-RAY EXAM OF ANKLE: CPT

## 2021-05-14 PROCEDURE — 81001 URINALYSIS AUTO W/SCOPE: CPT

## 2021-05-14 PROCEDURE — 85025 COMPLETE CBC W/AUTO DIFF WBC: CPT

## 2021-05-14 PROCEDURE — 86803 HEPATITIS C AB TEST: CPT

## 2021-05-14 PROCEDURE — 84550 ASSAY OF BLOOD/URIC ACID: CPT

## 2021-05-14 PROCEDURE — 36415 COLL VENOUS BLD VENIPUNCTURE: CPT

## 2021-05-14 PROCEDURE — 87340 HEPATITIS B SURFACE AG IA: CPT

## 2021-05-14 PROCEDURE — 73630 X-RAY EXAM OF FOOT: CPT

## 2021-05-14 PROCEDURE — 82550 ASSAY OF CK (CPK): CPT

## 2021-05-14 PROCEDURE — 82607 VITAMIN B-12: CPT

## 2021-05-14 PROCEDURE — 83036 HEMOGLOBIN GLYCOSYLATED A1C: CPT

## 2021-05-14 PROCEDURE — 73110 X-RAY EXAM OF WRIST: CPT

## 2021-05-14 PROCEDURE — 72170 X-RAY EXAM OF PELVIS: CPT

## 2021-05-16 LAB
ESTIMATED AVERAGE GLUCOSE: 128 MG/DL
HBA1C MFR BLD: 6.1 % (ref 4–6)

## 2021-05-26 DIAGNOSIS — J30.9 ALLERGIC RHINITIS, UNSPECIFIED SEASONALITY, UNSPECIFIED TRIGGER: ICD-10-CM

## 2021-05-26 RX ORDER — FLUTICASONE PROPIONATE 50 MCG
SPRAY, SUSPENSION (ML) NASAL
Qty: 1 BOTTLE | Refills: 2 | Status: SHIPPED | OUTPATIENT
Start: 2021-05-26

## 2021-05-26 NOTE — TELEPHONE ENCOUNTER
Mukund Silva is calling to request a refill on the following medication(s):    Last Visit Date (If Applicable):  8/23/0349    Next Visit Date:    Visit date not found    Medication Request:  Requested Prescriptions     Pending Prescriptions Disp Refills    fluticasone (FLONASE) 50 MCG/ACT nasal spray [Pharmacy Med Name: FLUTICASONE PROP 50 MCG SPRAY] 1 Bottle 2     Sig: SPRAY 1 SPRAY INTO EACH NOSTRIL EVERY DAY

## 2021-06-28 DIAGNOSIS — T50.905A ADVERSE EFFECT OF DRUG, INITIAL ENCOUNTER: Primary | ICD-10-CM

## 2021-06-28 RX ORDER — PANTOPRAZOLE SODIUM 40 MG/1
40 TABLET, DELAYED RELEASE ORAL DAILY
Qty: 30 TABLET | Refills: 2 | Status: SHIPPED | OUTPATIENT
Start: 2021-06-28 | End: 2021-07-20 | Stop reason: SDUPTHER

## 2021-07-20 DIAGNOSIS — T50.905A ADVERSE EFFECT OF DRUG, INITIAL ENCOUNTER: ICD-10-CM

## 2021-07-20 RX ORDER — PANTOPRAZOLE SODIUM 40 MG/1
40 TABLET, DELAYED RELEASE ORAL DAILY
Qty: 90 TABLET | Refills: 1 | Status: SHIPPED | OUTPATIENT
Start: 2021-07-20 | End: 2021-09-21 | Stop reason: ALTCHOICE

## 2021-07-20 NOTE — TELEPHONE ENCOUNTER
Abimael Campbell is calling to request a refill on the following medication(s):    Last Visit Date (If Applicable):  6/15/0595    Next Visit Date:    Visit date not found    Medication Request:  Requested Prescriptions     Pending Prescriptions Disp Refills    pantoprazole (PROTONIX) 40 MG tablet 90 tablet 1     Sig: Take 1 tablet by mouth daily

## 2021-07-22 ENCOUNTER — HOSPITAL ENCOUNTER (OUTPATIENT)
Age: 66
Setting detail: SPECIMEN
Discharge: HOME | End: 2021-07-22
Payer: COMMERCIAL

## 2021-07-22 ENCOUNTER — PATIENT MESSAGE (OUTPATIENT)
Dept: FAMILY MEDICINE CLINIC | Age: 66
End: 2021-07-22

## 2021-07-22 ENCOUNTER — OFFICE VISIT (OUTPATIENT)
Dept: FAMILY MEDICINE CLINIC | Age: 66
End: 2021-07-22
Payer: COMMERCIAL

## 2021-07-22 VITALS
SYSTOLIC BLOOD PRESSURE: 129 MMHG | BODY MASS INDEX: 23.82 KG/M2 | DIASTOLIC BLOOD PRESSURE: 83 MMHG | WEIGHT: 143 LBS | HEART RATE: 72 BPM | OXYGEN SATURATION: 99 % | RESPIRATION RATE: 16 BRPM | HEIGHT: 65 IN

## 2021-07-22 DIAGNOSIS — M81.0 OSTEOPOROSIS WITHOUT CURRENT PATHOLOGICAL FRACTURE, UNSPECIFIED OSTEOPOROSIS TYPE: ICD-10-CM

## 2021-07-22 DIAGNOSIS — M62.838 MUSCLE SPASM: ICD-10-CM

## 2021-07-22 DIAGNOSIS — R79.89 ELEVATED LFTS: ICD-10-CM

## 2021-07-22 DIAGNOSIS — R53.1 WEAKNESS: ICD-10-CM

## 2021-07-22 DIAGNOSIS — Z87.891 PERSONAL HISTORY OF SMOKING: ICD-10-CM

## 2021-07-22 DIAGNOSIS — R53.1 WEAKNESS: Primary | ICD-10-CM

## 2021-07-22 DIAGNOSIS — M62.838 MUSCLE SPASM: Primary | ICD-10-CM

## 2021-07-22 DIAGNOSIS — R63.4 WEIGHT LOSS: ICD-10-CM

## 2021-07-22 LAB
ALBUMIN SERPL-MCNC: 4.1 G/DL (ref 3.5–5.2)
ALBUMIN/GLOBULIN RATIO: 1.8 (ref 1–2.5)
ALP BLD-CCNC: 49 U/L (ref 35–104)
ALT SERPL-CCNC: 27 U/L (ref 5–33)
ANION GAP SERPL CALCULATED.3IONS-SCNC: 11 MMOL/L (ref 9–17)
AST SERPL-CCNC: 35 U/L
BILIRUB SERPL-MCNC: 0.43 MG/DL (ref 0.3–1.2)
BUN BLDV-MCNC: 9 MG/DL (ref 8–23)
BUN/CREAT BLD: ABNORMAL (ref 9–20)
CALCIUM SERPL-MCNC: 9.4 MG/DL (ref 8.6–10.4)
CHLORIDE BLD-SCNC: 106 MMOL/L (ref 98–107)
CO2: 22 MMOL/L (ref 20–31)
CREAT SERPL-MCNC: 0.66 MG/DL (ref 0.5–0.9)
GFR AFRICAN AMERICAN: >60 ML/MIN
GFR NON-AFRICAN AMERICAN: >60 ML/MIN
GFR SERPL CREATININE-BSD FRML MDRD: ABNORMAL ML/MIN/{1.73_M2}
GFR SERPL CREATININE-BSD FRML MDRD: ABNORMAL ML/MIN/{1.73_M2}
GLUCOSE FASTING: 99 MG/DL (ref 70–99)
HCT VFR BLD CALC: 43 % (ref 36.3–47.1)
HEMOGLOBIN: 13.8 G/DL (ref 11.9–15.1)
MAGNESIUM: 2.2 MG/DL (ref 1.6–2.6)
MCH RBC QN AUTO: 30.1 PG (ref 25.2–33.5)
MCHC RBC AUTO-ENTMCNC: 32.1 G/DL (ref 28.4–34.8)
MCV RBC AUTO: 93.9 FL (ref 82.6–102.9)
NRBC AUTOMATED: 0 PER 100 WBC
PDW BLD-RTO: 12.2 % (ref 11.8–14.4)
PLATELET # BLD: 224 K/UL (ref 138–453)
PMV BLD AUTO: 12 FL (ref 8.1–13.5)
POTASSIUM SERPL-SCNC: 4.6 MMOL/L (ref 3.7–5.3)
RBC # BLD: 4.58 M/UL (ref 3.95–5.11)
SODIUM BLD-SCNC: 139 MMOL/L (ref 135–144)
TOTAL PROTEIN: 6.4 G/DL (ref 6.4–8.3)
WBC # BLD: 5.2 K/UL (ref 3.5–11.3)

## 2021-07-22 PROCEDURE — 3017F COLORECTAL CA SCREEN DOC REV: CPT | Performed by: NURSE PRACTITIONER

## 2021-07-22 PROCEDURE — G8420 CALC BMI NORM PARAMETERS: HCPCS | Performed by: NURSE PRACTITIONER

## 2021-07-22 PROCEDURE — 99213 OFFICE O/P EST LOW 20 MIN: CPT | Performed by: NURSE PRACTITIONER

## 2021-07-22 PROCEDURE — 1036F TOBACCO NON-USER: CPT | Performed by: NURSE PRACTITIONER

## 2021-07-22 PROCEDURE — G8427 DOCREV CUR MEDS BY ELIG CLIN: HCPCS | Performed by: NURSE PRACTITIONER

## 2021-07-22 NOTE — PROGRESS NOTES
Louie Pavon is a 63 y.o. female who presents in office today with Self follow up on recent condition of     Chief Complaint   Patient presents with    Spasms        History of Present Illness:     HPI    When going to sleep at night, has muscle spasms in arms and legs. Waking her up in the night. Also having weakness in the morning. Unsure what it is causing it. Has been going on for about a month. Recently back from [address], visiting daughter. Lost 8 lbs. Limited appetite. Drinking a lot of water. Probably cancelling EMG due to $600 copay. She is also wanting to discuss Prolia injection as she was unable to tolerate fosamax. Care gaps: COVID-19 vaccine:   Pfizer x2, April and May 2021  Colon CA screenin  Vaccines:   shingles  Breast CA screening: due    Health Maintenance Due   Topic Date Due    DTaP/Tdap/Td vaccine (1 - Tdap) Never done    Breast cancer screen  Never done    Shingles Vaccine (1 of 2) Never done    Low dose CT lung screening  Never done        Patient Care Team:  SOFIA Hart CNP as PCP - General (Nurse Practitioner)  SOFIA Hart CNP as PCP - [facility] Empaneled Provider    Reviewed     [x] Past Medical, Family, and Social History was reviewed per writer and does contribute to the patient presenting condition.     [x] Laboratory Results, Vital signs, Imaging, Active Problems, Immunizations, Current/Recently Discontinued Medications, Health Maintenance Activities Due, Referral Notes (if available) were reviewed per writer     [x] Reviewed Depression screening if taken or valid today or any other valid screening tool (others seen below) Interpretation of Total Score DepressionSeverity: 1-4 = Minimal depression, 5-9 = Mild depression, 10-14 = Moderate depression, 15-19 = Moderately severe depression, 20-27 =Severe depression    PHQ Scores 5/10/2021 2021   PHQ2 Score 0 0   PHQ9 Score 0 0     Interpretation of Total Score Depression Severity: 1-4 = Minimal depression, 5-9 = Mild depression, 10-14 = Moderate depression, 15-19 = Moderately severe depression, 20-27 = Severe depression     Review of Systems (Subjective)     Review of Systems   Constitutional: Positive for activity change, appetite change, fatigue and unexpected weight change. Respiratory: Negative for cough, chest tightness and shortness of breath. Cardiovascular: Negative for chest pain and palpitations. Gastrointestinal: Negative for constipation, diarrhea and nausea. Genitourinary: Negative for difficulty urinating and dysuria. Neurological: Positive for weakness. Negative for light-headedness and numbness. See HPI     Physical Assessment (Objective)     /83   Pulse 72   Resp 16   Ht 5' 5\" (1.651 m)   Wt 143 lb (64.9 kg)   SpO2 99%   BMI 23.80 kg/m²      Physical Exam  Vitals reviewed. Constitutional:       Appearance: She is normal weight. HENT:      Head: Normocephalic and atraumatic. Eyes:      Extraocular Movements: Extraocular movements intact. Conjunctiva/sclera: Conjunctivae normal.   Cardiovascular:      Rate and Rhythm: Normal rate and regular rhythm. Pulses: Normal pulses. Heart sounds: Normal heart sounds. No murmur heard. Pulmonary:      Effort: Pulmonary effort is normal. No respiratory distress. Breath sounds: Normal breath sounds. No wheezing. Abdominal:      General: Bowel sounds are normal.      Palpations: Abdomen is soft. Musculoskeletal:         General: No swelling. Cervical back: Normal range of motion. Skin:     General: Skin is warm and dry. Capillary Refill: Capillary refill takes less than 2 seconds. Neurological:      Mental Status: She is alert and oriented to person, place, and time. Psychiatric:         Mood and Affect: Mood normal.         Behavior: Behavior normal.         Thought Content: Thought content normal.         Judgment: Judgment normal.         Diagnoses / Plan:   1. Weakness  -     Magnesium; Future  -     CBC; Future  -     Comprehensive Metabolic Panel, Fasting; Future  -     Vitamin D 25 Hydroxy; Future  -     XR CHEST STANDARD (2 VW); Future  -     External Referral To Neurology  2. Muscle spasm  -     Magnesium; Future  -     CBC; Future  -     Comprehensive Metabolic Panel, Fasting; Future  -     External Referral To Neurology  3. Elevated LFTs  -     Comprehensive Metabolic Panel, Fasting; Future  4. Personal history of smoking  -     XR CHEST STANDARD (2 VW); Future  -     CT LUNG SCREENING; Future  5. Osteoporosis without current pathological fracture, unspecified osteoporosis type  -     Vitamin D 25 Hydroxy; Future  -     denosumab (PROLIA) 60 MG/ML SOSY SC injection; Inject 1 mL into the skin once for 1 dose, Disp-1 mL, R-0Normal  6. Weight loss  -     XR CHEST STANDARD (2 VW); Future       Encouraged healthy diet and exercise. Call office with any new or worsening symptoms or concerns. Return in 4 weeks (on 8/19/2021), or if symptoms worsen or fail to improve. Electronically signed by SOFIA Curiel CNP on 7/23/2021 at 12:57 PM    Note is dictated utilizing voice recognition software. Unfortunately this leads to occasional typographical errors. Please contact our office if you have any questions.

## 2021-07-23 ASSESSMENT — ENCOUNTER SYMPTOMS
DIARRHEA: 0
SHORTNESS OF BREATH: 0
CONSTIPATION: 0
NAUSEA: 0
CHEST TIGHTNESS: 0
COUGH: 0

## 2021-07-23 NOTE — TELEPHONE ENCOUNTER
From: Satish Shultz  To: SOFIA Small CNP  Sent: 7/22/2021 7:39 PM EDT  Subject: Test Results Question    It looks like all my levels were normal except the AST. What does that mean? Could that be causing muscle spasms in my arms and legs? What can I do to get rid of these, I 'm not getting enough sleep and feel tired all day. I'm worried that it's a severe medical condition causing this. Thanks.

## 2021-07-26 ENCOUNTER — APPOINTMENT (OUTPATIENT)
Dept: CT IMAGING | Facility: CLINIC | Age: 66
End: 2021-07-26
Payer: COMMERCIAL

## 2021-07-26 ENCOUNTER — TELEPHONE (OUTPATIENT)
Dept: FAMILY MEDICINE CLINIC | Age: 66
End: 2021-07-26

## 2021-07-26 ENCOUNTER — HOSPITAL ENCOUNTER (EMERGENCY)
Facility: CLINIC | Age: 66
Discharge: HOME | End: 2021-07-26
Attending: EMERGENCY MEDICINE
Payer: COMMERCIAL

## 2021-07-26 VITALS
SYSTOLIC BLOOD PRESSURE: 167 MMHG | DIASTOLIC BLOOD PRESSURE: 98 MMHG | BODY MASS INDEX: 23.8 KG/M2 | HEART RATE: 78 BPM | WEIGHT: 143 LBS | RESPIRATION RATE: 16 BRPM | OXYGEN SATURATION: 96 % | TEMPERATURE: 98.1 F

## 2021-07-26 DIAGNOSIS — F44.5 PSYCHOGENIC NONEPILEPTIC SEIZURE: Primary | ICD-10-CM

## 2021-07-26 LAB
ABSOLUTE EOS #: 0.2 K/UL (ref 0–0.4)
ABSOLUTE IMMATURE GRANULOCYTE: ABNORMAL K/UL (ref 0–0.3)
ABSOLUTE LYMPH #: 2.5 K/UL (ref 1–4.8)
ABSOLUTE MONO #: 0.5 K/UL (ref 0.1–1.2)
ALBUMIN SERPL-MCNC: 4.1 G/DL (ref 3.5–5.2)
ALBUMIN/GLOBULIN RATIO: 1.8 (ref 1–2.5)
ALP BLD-CCNC: 54 U/L (ref 35–104)
ALT SERPL-CCNC: 26 U/L (ref 5–33)
ANION GAP SERPL CALCULATED.3IONS-SCNC: 11 MMOL/L (ref 9–17)
AST SERPL-CCNC: 34 U/L
BASOPHILS # BLD: 1 % (ref 0–2)
BASOPHILS ABSOLUTE: 0.1 K/UL (ref 0–0.2)
BILIRUB SERPL-MCNC: 0.3 MG/DL (ref 0.3–1.2)
BUN BLDV-MCNC: 11 MG/DL (ref 8–23)
BUN/CREAT BLD: ABNORMAL (ref 9–20)
CALCIUM SERPL-MCNC: 9.8 MG/DL (ref 8.6–10.4)
CHLORIDE BLD-SCNC: 104 MMOL/L (ref 98–107)
CO2: 20 MMOL/L (ref 20–31)
CREAT SERPL-MCNC: 0.6 MG/DL (ref 0.5–0.9)
DIFFERENTIAL TYPE: ABNORMAL
EOSINOPHILS RELATIVE PERCENT: 4 % (ref 1–4)
GFR AFRICAN AMERICAN: >60 ML/MIN
GFR NON-AFRICAN AMERICAN: >60 ML/MIN
GFR SERPL CREATININE-BSD FRML MDRD: ABNORMAL ML/MIN/{1.73_M2}
GFR SERPL CREATININE-BSD FRML MDRD: ABNORMAL ML/MIN/{1.73_M2}
GLUCOSE BLD-MCNC: 103 MG/DL (ref 70–99)
HCT VFR BLD CALC: 40.8 % (ref 36–46)
HEMOGLOBIN: 13.7 G/DL (ref 12–16)
IMMATURE GRANULOCYTES: ABNORMAL %
LACTIC ACID: 0.9 MMOL/L (ref 0.5–2.2)
LYMPHOCYTES # BLD: 46 % (ref 24–44)
MCH RBC QN AUTO: 30.9 PG (ref 26–34)
MCHC RBC AUTO-ENTMCNC: 33.4 G/DL (ref 31–37)
MCV RBC AUTO: 92.3 FL (ref 80–100)
MONOCYTES # BLD: 10 % (ref 2–11)
NRBC AUTOMATED: ABNORMAL PER 100 WBC
PDW BLD-RTO: 12.6 % (ref 12.5–15.4)
PLATELET # BLD: 217 K/UL (ref 140–450)
PLATELET ESTIMATE: ABNORMAL
PMV BLD AUTO: 8.9 FL (ref 6–12)
POTASSIUM SERPL-SCNC: 4 MMOL/L (ref 3.7–5.3)
RBC # BLD: 4.43 M/UL (ref 4–5.2)
RBC # BLD: ABNORMAL 10*6/UL
SEG NEUTROPHILS: 39 % (ref 36–66)
SEGMENTED NEUTROPHILS ABSOLUTE COUNT: 2.1 K/UL (ref 1.8–7.7)
SODIUM BLD-SCNC: 135 MMOL/L (ref 135–144)
TOTAL PROTEIN: 6.4 G/DL (ref 6.4–8.3)
WBC # BLD: 5.4 K/UL (ref 3.5–11)
WBC # BLD: ABNORMAL 10*3/UL

## 2021-07-26 PROCEDURE — 99282 EMERGENCY DEPT VISIT SF MDM: CPT

## 2021-07-26 PROCEDURE — 80053 COMPREHEN METABOLIC PANEL: CPT

## 2021-07-26 PROCEDURE — 83605 ASSAY OF LACTIC ACID: CPT

## 2021-07-26 PROCEDURE — 36415 COLL VENOUS BLD VENIPUNCTURE: CPT

## 2021-07-26 PROCEDURE — 85025 COMPLETE CBC W/AUTO DIFF WBC: CPT

## 2021-07-26 PROCEDURE — 70450 CT HEAD/BRAIN W/O DYE: CPT

## 2021-07-26 ASSESSMENT — PAIN DESCRIPTION - LOCATION: LOCATION: HEAD

## 2021-07-26 ASSESSMENT — PAIN DESCRIPTION - PAIN TYPE: TYPE: ACUTE PAIN

## 2021-07-26 ASSESSMENT — PAIN SCALES - GENERAL: PAINLEVEL_OUTOF10: 6

## 2021-07-26 ASSESSMENT — PAIN DESCRIPTION - ORIENTATION: ORIENTATION: LEFT

## 2021-07-26 NOTE — ED PROVIDER NOTES
[facility]  [address] [facility]  Phone: [address] [place]      Pt Name: Warren Gifford  MRN: [id number]  [place] 1955  Date of evaluation: 7/26/2021    CHIEF COMPLAINT       Chief Complaint   Patient presents with    Seizures     complaining of \"nocturnal seizures\", she states \"My arms and my legs shake\", she states that she is conscious when this is happening, she states that she was at the doctor on the 22nd and they said she was having muscle spasms, she states she has been doing research and is certain she is having \"nocturnal seizures\"       HISTORY OF PRESENT ILLNESS    Warren Gifford is a 63 y.o. female who presents voicing concern about nocturnal seizures. She states that she has had what she feels are nocturnal seizures for the last 8 to 10 weeks. Scribes her seizures as shakiness as she is waking up. States that she is awake and she knows that she is waking up and she knows that she is shaking. She states that she saw her family doctor and the family doctor referred her to a neurologist and the neurologist does not take her insurance so they told her to come to the emergency department to get evaluated. He has had no history of your disorder she is concerned because she has a son that had brain cancer. Has had no new medications no numbness or tingling. Scribes her headache is on the left side of her head.     REVIEW OF SYSTEMS     Constitutional: No fevers or chills   HEENT: No sore throat, rhinorrhea, or earache   Eyes: No blurry vision or double vision no drainage   Cardiovascular: No chest pain or tachycardia   Respiratory: No wheezing or shortness of breath no cough   Gastrointestinal: No nausea, vomiting, diarrhea, constipation, or abdominal pain   : No hematuria or dysuria   Musculoskeletal: No swelling or pain   Skin: No rash   Neurological: No focal neurologic complaints, paresthesias, weakness, or headache   PAST MEDICAL HISTORY    has a past medical history of Reactive depression. SURGICAL HISTORY      has a past surgical history that includes Hysterectomy, vaginal () and Varicose vein surgery. CURRENT MEDICATIONS       Discharge Medication List as of 2021  4:32 PM      CONTINUE these medications which have NOT CHANGED    Details   denosumab (PROLIA) 60 MG/ML SOSY SC injection Inject 1 mL into the skin once for 1 dose, Disp-1 mL, R-0Normal      pantoprazole (PROTONIX) 40 MG tablet Take 1 tablet by mouth daily, Disp-90 tablet, R-1Normal      buPROPion (ZYBAN) 150 MG extended release tablet Take 1 tablet by mouth 2 times daily, Disp-60 tablet, R-2Normal      fluticasone (FLONASE) 50 MCG/ACT nasal spray SPRAY 1 SPRAY INTO EACH NOSTRIL EVERY DAY, Disp-1 Bottle, R-2Normal      albuterol sulfate  (90 Base) MCG/ACT inhaler Inhale 2 puffs into the lungs every 6 hours as needed for Wheezing, Disp-1 Inhaler, R-2Normal      Multiple Vitamins-Minerals (THERAPEUTIC MULTIVITAMIN-MINERALS) tablet Take 1 tablet by mouth dailyHistorical Med      ASPIRIN 81 PO Take by mouthHistorical Med      Ascorbic Acid (VITAMIN C) 250 MG tablet Take 250 mg by mouth dailyHistorical Med      vitamin B-12 (CYANOCOBALAMIN) 100 MCG tablet Take 50 mcg by mouth dailyHistorical Med      Calcium Carb-Cholecalciferol 600-500 MG-UNIT CAPS Take by mouthHistorical Med      Flax Oil-Fish Oil-Borage Oil (FISH OIL-FLAX OIL-BORAGE OIL) CAPS Take by mouthHistorical Med      folic acid (FOLVITE) 1 MG tablet Take 1 mg by mouth dailyHistorical Med      Glucosamine HCl 1000 MG TABS Take by mouthHistorical Med      magnesium gluconate (MAGONATE) 500 MG tablet Take 500 mg by mouth 2 times dailyHistorical Med             ALLERGIES     is allergic to codeine, hydrocodone, tetracycline, and vicodin [hydrocodone-acetaminophen]. FAMILY HISTORY     She indicated that her father is . She indicated that her sister is alive.  She indicated that only one of her two brothers is alive. family history includes High Blood Pressure in her brother and sister; Levi Santos in her brother and father. SOCIAL HISTORY      reports that she quit smoking about 3 months ago. Her smoking use included cigarettes. She started smoking about 15 months ago. She has a 35.00 pack-year smoking history. She has never used smokeless tobacco.    PHYSICAL EXAM       ED Triage Vitals [07/26/21 1536]   BP Temp Temp Source Pulse Resp SpO2 Height Weight   (!) 167/98 98.1 °F (36.7 °C) Oral 78 16 96 % -- 143 lb (64.9 kg)     Constitutional: Alert, oriented x3, nontoxic, answering questions appropriately, acting properly for age, in no acute distress   HEENT: Extraocular muscles intact, mucus membranes moist, TMs clear bilaterally, no posterior pharyngeal erythema or exudates, Pupils equal, round, reactive to light,   Neck: Trachea midline   Cardiovascular: Regular rhythm and rate no S3, S4, or murmurs   Respiratory: Clear to auscultation bilaterally no wheezes, rhonchi, rales, no respiratory distress no tachypnea no retractions no hypoxia  Gastrointestinal: Soft, nontender, nondistended, positive bowel sounds. No rebound, rigidity, or guarding.    Musculoskeletal: No extremity pain or swelling   Neurologic: Moving all 4 extremities without difficulty there are no gross focal neurologic deficits   Skin: Warm and dry     DIFFERENTIAL DIAGNOSIS/ MDM:         DIAGNOSTIC RESULTS     EKG: All EKG's are interpreted by the Emergency Department Physician who either signs or Co-signs this chart in the absence of a cardiologist.        Not indicated unless otherwise documented above    LABS:  Results for orders placed or performed during the hospital encounter of 07/26/21   CBC Auto Differential   Result Value Ref Range    WBC 5.4 3.5 - 11.0 k/uL    RBC 4.43 4.0 - 5.2 m/uL    Hemoglobin 13.7 12.0 - 16.0 g/dL    Hematocrit 40.8 36 - 46 %    MCV 92.3 80 - 100 fL    MCH 30.9 26 - 34 pg    MCHC 33.4 31 - 37 g/dL RDW 12.6 12.5 - 15.4 %    Platelets 854 289 - 360 k/uL    MPV 8.9 6.0 - 12.0 fL    NRBC Automated NOT REPORTED per 100 WBC    Differential Type NOT REPORTED     Seg Neutrophils 39 36 - 66 %    Lymphocytes 46 (H) 24 - 44 %    Monocytes 10 2 - 11 %    Eosinophils % 4 1 - 4 %    Basophils 1 0 - 2 %    Immature Granulocytes NOT REPORTED 0 %    Segs Absolute 2.10 1.8 - 7.7 k/uL    Absolute Lymph # 2.50 1.0 - 4.8 k/uL    Absolute Mono # 0.50 0.1 - 1.2 k/uL    Absolute Eos # 0.20 0.0 - 0.4 k/uL    Basophils Absolute 0.10 0.0 - 0.2 k/uL    Absolute Immature Granulocyte NOT REPORTED 0.00 - 0.30 k/uL    WBC Morphology NOT REPORTED     RBC Morphology NOT REPORTED     Platelet Estimate NOT REPORTED    Comprehensive Metabolic Panel w/ Reflex to MG   Result Value Ref Range    Glucose 103 (H) 70 - 99 mg/dL    BUN 11 8 - 23 mg/dL    CREATININE 0.60 0.50 - 0.90 mg/dL    Bun/Cre Ratio NOT REPORTED 9 - 20    Calcium 9.8 8.6 - 10.4 mg/dL    Sodium 135 135 - 144 mmol/L    Potassium 4.0 3.7 - 5.3 mmol/L    Chloride 104 98 - 107 mmol/L    CO2 20 20 - 31 mmol/L    Anion Gap 11 9 - 17 mmol/L    Alkaline Phosphatase 54 35 - 104 U/L    ALT 26 5 - 33 U/L    AST 34 (H) <32 U/L    Total Bilirubin 0.30 0.3 - 1.2 mg/dL    Total Protein 6.4 6.4 - 8.3 g/dL    Albumin 4.1 3.5 - 5.2 g/dL    Albumin/Globulin Ratio 1.8 1.0 - 2.5    GFR Non-African American >60 >60 mL/min    GFR African American >60 >60 mL/min    GFR Comment          GFR Staging NOT REPORTED    Lactic Acid   Result Value Ref Range    Lactic Acid 0.9 0.5 - 2.2 mmol/L       Not indicated unless otherwise documented above    RADIOLOGY:   I reviewed the radiologist interpretations:    CT Head WO Contrast   Final Result   At least partially empty sella      No acute intracranial abnormality             Not indicated unless otherwise documented above    EMERGENCY DEPARTMENT COURSE:     The patient was given the following medications:  No orders of the defined types were placed in this encounter. Vitals:   -------------------------  BP (!) 167/98   Pulse 78   Temp 98.1 °F (36.7 °C) (Oral)   Resp 16   Wt 64.9 kg (143 lb)   SpO2 96%   BMI 23.80 kg/m²         I have reviewed the disposition diagnosis with the patient and or their family/guardian. I have answered their questions and given discharge instructions. They voiced understanding of these instructions and did not have any furtherquestions or complaints. CRITICAL CARE:    None    CONSULTS:    None    PROCEDURES:    None      OARRS Report if indicated             FINAL IMPRESSION      1.  Psychogenic nonepileptic seizure          DISPOSITION/PLAN   DISPOSITION Decision To Discharge 07/26/2021 04:30:19 PM        CONDITION ON DISPOSITION: STABLE       PATIENT REFERRED TO:  SOFIA Manriquez CNP [address] [address]  [phone]    In 3 days  If symptoms worsen      DISCHARGE MEDICATIONS:  Discharge Medication List as of 7/26/2021  4:32 PM          (Please note that portions of thisnote were completed with a voice recognition program.  Efforts were made to edit the dictations but occasionally words are mis-transcribed.)    Wilver Mtz MD,, MD  Attending Emergency Physician        Wilver Mtz MD  07/26/21 MD [place]  07/28/21 1478

## 2021-07-28 ENCOUNTER — TELEPHONE (OUTPATIENT)
Dept: FAMILY MEDICINE CLINIC | Age: 66
End: 2021-07-28

## 2021-07-28 RX ORDER — DOXEPIN HYDROCHLORIDE 3 MG/1
3 TABLET ORAL NIGHTLY
Qty: 14 TABLET | Refills: 0 | Status: SHIPPED | OUTPATIENT
Start: 2021-07-28 | End: 2021-08-11

## 2021-07-28 NOTE — TELEPHONE ENCOUNTER
Pt does not meet criteria for Ct screening. Please review reasoning scanned in media. Pts years smoked is 28. Also have to get vitamin d drawn lab did not accept add on due to needing a yellow tube.  lvm informing pt

## 2021-07-28 NOTE — TELEPHONE ENCOUNTER
Pt informed about the lab draw and CT scan. Pt states she sent a [employer] message to PCP requesting a sleep med and requests a response to [employer].

## 2021-07-30 DIAGNOSIS — T50.905A ADVERSE EFFECT OF DRUG, INITIAL ENCOUNTER: ICD-10-CM

## 2021-07-30 RX ORDER — PANTOPRAZOLE SODIUM 40 MG/1
TABLET, DELAYED RELEASE ORAL
Qty: 90 TABLET | Refills: 1 | OUTPATIENT
Start: 2021-07-30

## 2021-08-02 ENCOUNTER — OFFICE VISIT (OUTPATIENT)
Dept: FAMILY MEDICINE CLINIC | Age: 66
End: 2021-08-02
Payer: COMMERCIAL

## 2021-08-02 VITALS
DIASTOLIC BLOOD PRESSURE: 72 MMHG | WEIGHT: 140.8 LBS | OXYGEN SATURATION: 97 % | BODY MASS INDEX: 23.46 KG/M2 | HEIGHT: 65 IN | SYSTOLIC BLOOD PRESSURE: 132 MMHG | HEART RATE: 80 BPM | TEMPERATURE: 96.4 F

## 2021-08-02 DIAGNOSIS — M62.838 MUSCLE SPASM: Primary | ICD-10-CM

## 2021-08-02 DIAGNOSIS — R53.1 WEAKNESS: ICD-10-CM

## 2021-08-02 PROCEDURE — 1036F TOBACCO NON-USER: CPT | Performed by: NURSE PRACTITIONER

## 2021-08-02 PROCEDURE — G8427 DOCREV CUR MEDS BY ELIG CLIN: HCPCS | Performed by: NURSE PRACTITIONER

## 2021-08-02 PROCEDURE — 99213 OFFICE O/P EST LOW 20 MIN: CPT | Performed by: NURSE PRACTITIONER

## 2021-08-02 PROCEDURE — G8420 CALC BMI NORM PARAMETERS: HCPCS | Performed by: NURSE PRACTITIONER

## 2021-08-02 PROCEDURE — 3017F COLORECTAL CA SCREEN DOC REV: CPT | Performed by: NURSE PRACTITIONER

## 2021-08-02 RX ORDER — ROPINIROLE 0.25 MG/1
0.25 TABLET, FILM COATED ORAL NIGHTLY
Qty: 30 TABLET | Refills: 0 | Status: SHIPPED | OUTPATIENT
Start: 2021-08-02 | End: 2021-08-24

## 2021-08-02 ASSESSMENT — ENCOUNTER SYMPTOMS
DIARRHEA: 0
CONSTIPATION: 0
NAUSEA: 0
COUGH: 0
SHORTNESS OF BREATH: 0
CHEST TIGHTNESS: 0

## 2021-08-02 NOTE — PROGRESS NOTES
Levi Browning is a 63 y.o. female who presents in office today with Self follow up on recent condition of     Chief Complaint   Patient presents with    Follow-up     discuss sleep medication,neuro referral,possible seizure while sleeping        History of Present Illness:     HPI    Saturday night had 3 of them. Didn't take meloxicam last night and didn't have one last night. Still feeling very tired and weak, especially in the morning. Difficulty moving from back to side while sleeping. Back of legs get really tight and both arms shake. Wakes her up when it starts, usually between 4 and 6 am.     Care gaps: COVID-19 vaccine:   Pfizer x2, April and May 2021  Colon CA screenin  Vaccines:   shingles  Breast CA screening: due    Health Maintenance Due   Topic Date Due    DTaP/Tdap/Td vaccine (1 - Tdap) Never done    Breast cancer screen  Never done    Shingles Vaccine (1 of 2) Never done    Low dose CT lung screening  Never done        Patient Care Team:  SOFIA Johnson CNP as PCP - General (Nurse Practitioner)  SOFIA Johnson CNP as PCP - [facility] Empaneled Provider    Reviewed     [x] Past Medical, Family, and Social History was reviewed per writer and does contribute to the patient presenting condition.     [x] Laboratory Results, Vital signs, Imaging, Active Problems, Immunizations, Current/Recently Discontinued Medications, Health Maintenance Activities Due, Referral Notes (if available) were reviewed per writer     [x] Reviewed Depression screening if taken or valid today or any other valid screening tool (others seen below) Interpretation of Total Score DepressionSeverity: 1-4 = Minimal depression, 5-9 = Mild depression, 10-14 = Moderate depression, 15-19 = Moderately severe depression, 20-27 =Severe depression    PHQ Scores 5/10/2021 2021   PHQ2 Score 0 0   PHQ9 Score 0 0     Interpretation of Total Score Depression Severity: 1-4 = Minimal depression, 5-9 = Mild

## 2021-08-02 NOTE — PATIENT INSTRUCTIONS
Patient Education        Restless Legs Syndrome: Care Instructions  Your Care Instructions  Restless legs syndrome is a common nervous system problem. People with this syndrome feel a creeping, achy, or unpleasant feeling in the legs and an overpowering urge to move them. It often occurs in the evening and at night and can lead to sleep problems and tiredness. Your doctor may suggest doing a study of your sleep patterns to figure out what is happening when you try to sleep. Many people get relief from symptoms when they get regular exercise, eat well, and avoid caffeine, alcohol, and tobacco.  Follow-up care is a key part of your treatment and safety. Be sure to make and go to all appointments, and call your doctor if you are having problems. It's also a good idea to know your test results and keep a list of the medicines you take. How can you care for yourself at home? · Take your medicines exactly as prescribed. Call your doctor if you think you are having a problem with your medicine. · Try bathing in hot or cold water. Applying a heating pad or ice bag to your legs may also help symptoms. · Stretch and massage your legs before bed or when discomfort begins. · Get some exercise for at least 30 minutes a day on most days of the week. Stop exercising at least 3 hours before bedtime. · Try to plan for situations where you will need to remain seated for long stretches. For example, if you are traveling by car, plan some stops so you can get out and walk around. · Tell your doctor about any medicines you are taking. This includes all over-the-counter, prescription, and herbal medicines. Some medicines, such as antidepressants, antihistamines, and cold and sinus medicines, can make your symptoms worse. · Avoid caffeine products, such as coffee, tea, cola, and chocolate. Caffeine can interrupt your sleep and stimulate you. · Do not smoke. Nicotine can make restless legs worse.  If you need help quitting, talk to your doctor about stop-smoking programs and medicines. These can increase your chances of quitting for good. · Do not drink alcohol late in the evening. Take steps to help you sleep better  · Get plenty of sunlight in the outdoors, particularly later in the afternoon. · Use the evening hours for settling down. Avoid activities that challenge you in the hours before bedtime. · Eat meals at regular times, and do not snack before bedtime. · Keep your bedroom quiet, dark, and cool. Try using a sleep mask and earplugs to help you sleep. · Limit how much you drink at night to reduce your need to get up to urinate. But do not go to bed thirsty. · Run a fan or other steady \"white noise\" during the night if noises wake you up. · Snow Camp the bed for sleeping and sex. Do your reading or TV watching in another room. · Once you are in bed, relax from head to toe, and guide your mind to pleasant thoughts. · Do not stay in bed longer than 8 hours, and try to avoid naps. When should you call for help? Watch closely for changes in your health, and be sure to contact your doctor if:    · You are still not getting enough sleep.     · Your symptoms become more severe or happen more often. Where can you learn more? Go to [email or web address] org and sign in to your OpenSpace account. Enter E233 in the [facility] box to learn more about \"Restless Legs Syndrome: Care Instructions. \"     If you do not have an account, please click on the \"Sign Up Now\" link. Current as of: August 4, 2020               Content Version: 12.9  © 3677-2040 Healthwise, C-Note. Care instructions adapted under license by [facility] [address] If you have questions about a medical condition or this instruction, always ask your healthcare professional. [place] [address] any warranty or liability for your use of this information.

## 2021-08-04 ENCOUNTER — TELEPHONE (OUTPATIENT)
Dept: FAMILY MEDICINE CLINIC | Age: 66
End: 2021-08-04

## 2021-08-12 ENCOUNTER — PATIENT MESSAGE (OUTPATIENT)
Dept: FAMILY MEDICINE CLINIC | Age: 66
End: 2021-08-12

## 2021-08-12 DIAGNOSIS — M62.838 MUSCLE SPASM: Primary | ICD-10-CM

## 2021-08-12 DIAGNOSIS — R53.1 WEAKNESS: ICD-10-CM

## 2021-08-12 NOTE — TELEPHONE ENCOUNTER
Pt request referral to be sent to to [facility] [address] @ [phone]

## 2021-08-26 DIAGNOSIS — M81.0 OSTEOPOROSIS WITHOUT CURRENT PATHOLOGICAL FRACTURE, UNSPECIFIED OSTEOPOROSIS TYPE: ICD-10-CM

## 2021-08-31 ENCOUNTER — OFFICE VISIT (OUTPATIENT)
Dept: FAMILY MEDICINE CLINIC | Age: 66
End: 2021-08-31
Payer: COMMERCIAL

## 2021-08-31 ENCOUNTER — TELEPHONE (OUTPATIENT)
Dept: FAMILY MEDICINE CLINIC | Age: 66
End: 2021-08-31

## 2021-08-31 VITALS
BODY MASS INDEX: 23.19 KG/M2 | DIASTOLIC BLOOD PRESSURE: 78 MMHG | WEIGHT: 139.2 LBS | TEMPERATURE: 96 F | HEART RATE: 65 BPM | HEIGHT: 65 IN | OXYGEN SATURATION: 96 % | SYSTOLIC BLOOD PRESSURE: 124 MMHG

## 2021-08-31 DIAGNOSIS — R53.1 WEAKNESS: ICD-10-CM

## 2021-08-31 DIAGNOSIS — M62.838 MUSCLE SPASM: Primary | ICD-10-CM

## 2021-08-31 PROCEDURE — 1036F TOBACCO NON-USER: CPT | Performed by: NURSE PRACTITIONER

## 2021-08-31 PROCEDURE — G8427 DOCREV CUR MEDS BY ELIG CLIN: HCPCS | Performed by: NURSE PRACTITIONER

## 2021-08-31 PROCEDURE — G8420 CALC BMI NORM PARAMETERS: HCPCS | Performed by: NURSE PRACTITIONER

## 2021-08-31 PROCEDURE — 99213 OFFICE O/P EST LOW 20 MIN: CPT | Performed by: NURSE PRACTITIONER

## 2021-08-31 PROCEDURE — 3017F COLORECTAL CA SCREEN DOC REV: CPT | Performed by: NURSE PRACTITIONER

## 2021-08-31 RX ORDER — CYCLOBENZAPRINE HCL 5 MG
5 TABLET ORAL NIGHTLY PRN
Qty: 30 TABLET | Refills: 0 | Status: SHIPPED | OUTPATIENT
Start: 2021-08-31 | End: 2021-09-10

## 2021-08-31 ASSESSMENT — ENCOUNTER SYMPTOMS
NAUSEA: 0
CONSTIPATION: 0
CHEST TIGHTNESS: 0
DIARRHEA: 0
SHORTNESS OF BREATH: 0
COUGH: 0

## 2021-08-31 NOTE — PROGRESS NOTES
Kory Keith is a 63 y.o. female who presents in office today with Self follow up on recent condition of     Chief Complaint   Patient presents with    Spasms        History of Present Illness:     HPI     Here for follow up. Appointment with Jason HOLLINGSWORTH on 10/6/21. Had difficulty getting scheduled sooner. Still having muscle spasms. Wondering if it may be spacity. Sleeping okay until around 4 am every night and then bilateral calf muscles will tighten up. If arms are outside of sheets,they will start to shake and getting more frequent than before. Has been doing exercises at home, feels like hamstrings are tight. No numbness or tingling. Would like to try muscle relaxer. Taking half of Requip due to making her groggy. Feels weight loss related to decreased cooking in the morning due to feeling tired. Got call this morning saying Prolia was approved. Care gaps: COVID-19 vaccine:   Pfizer x2, April and May 2021  Colon CA screenin  Vaccines:   shingles  Breast CA screening: due    Health Maintenance Due   Topic Date Due    DTaP/Tdap/Td vaccine (1 - Tdap) Never done    Breast cancer screen  Never done    Shingles Vaccine (1 of 2) Never done    Low dose CT lung screening  Never done        Patient Care Team:  SOFIA Guevara CNP as PCP - General (Nurse Practitioner)  SOFIA Guevara CNP as PCP - [facility] Empaneled Provider    Reviewed     [x] Past Medical, Family, and Social History was reviewed per writer and does contribute to the patient presenting condition.     [x] Laboratory Results, Vital signs, Imaging, Active Problems, Immunizations, Current/Recently Discontinued Medications, Health Maintenance Activities Due, Referral Notes (if available) were reviewed per writer     [x] Reviewed Depression screening if taken or valid today or any other valid screening tool (others seen below) Interpretation of Total Score DepressionSeverity: 1-4 = Minimal depression, 5-9 = Mild depression, 10-14 = Moderate depression, 15-19 = Moderately severe depression, 20-27 =Severe depression    PHQ Scores 5/10/2021 4/22/2021   PHQ2 Score 0 0   PHQ9 Score 0 0     Interpretation of Total Score Depression Severity: 1-4 = Minimal depression, 5-9 = Mild depression, 10-14 = Moderate depression, 15-19 = Moderately severe depression, 20-27 = Severe depression     Review of Systems (Subjective)     Review of Systems   Constitutional: Negative for activity change, appetite change and fatigue. Respiratory: Negative for cough, chest tightness and shortness of breath. Cardiovascular: Negative for chest pain and palpitations. Gastrointestinal: Negative for constipation, diarrhea and nausea. Genitourinary: Negative for difficulty urinating and dysuria. Musculoskeletal: Positive for myalgias. Muscle spasms   Neurological: Positive for weakness. Negative for light-headedness and numbness. See HPI     Physical Assessment (Objective)     /78   Pulse 65   Temp 96 °F (35.6 °C)   Ht 5' 5\" (1.651 m)   Wt 139 lb 3.2 oz (63.1 kg)   SpO2 96%   BMI 23.16 kg/m²      Physical Exam  Vitals reviewed. Constitutional:       Appearance: She is normal weight. HENT:      Head: Normocephalic and atraumatic. Eyes:      Extraocular Movements: Extraocular movements intact. Conjunctiva/sclera: Conjunctivae normal.   Cardiovascular:      Rate and Rhythm: Normal rate and regular rhythm. Pulses: Normal pulses. Heart sounds: Normal heart sounds. No murmur heard. Pulmonary:      Effort: Pulmonary effort is normal. No respiratory distress. Breath sounds: Normal breath sounds. No wheezing. Abdominal:      General: Bowel sounds are normal.      Palpations: Abdomen is soft. Musculoskeletal:         General: No swelling. Cervical back: Normal range of motion. Skin:     General: Skin is warm and dry. Capillary Refill: Capillary refill takes less than 2 seconds. Neurological:      Mental Status: She is alert and oriented to person, place, and time. Psychiatric:         Mood and Affect: Mood normal.         Behavior: Behavior normal.         Thought Content: Thought content normal.         Judgment: Judgment normal.         Diagnoses / Plan:   1. Muscle spasm  -     [facility] [place]  -     cyclobenzaprine (FLEXERIL) 5 MG tablet; Take 1 tablet by mouth nightly as needed for Muscle spasms, Disp-30 tablet, R-0Normal  2. Weakness  -     [facility] [place]     Follow with neurology as scheduled. Trial cyclobenzaprine for muscle spasms. If unhelpful, consider polysomnography for further evaluation of sleep disturbance. Encouraged healthy diet and exercise. Call office with any new or worsening symptoms or concerns. Return in about 3 months (around 11/30/2021) for Med Check. Electronically signed by SOFIA Nino CNP on 8/31/2021 at 6:42 PM    Note is dictated utilizing voice recognition software. Unfortunately this leads to occasional typographical errors. Please contact our office if you have any questions.

## 2021-08-31 NOTE — TELEPHONE ENCOUNTER
----- Message from Nakul Odonnelljulieta sent at 8/31/2021  9:39 AM EDT -----  Subject: Message to Provider    QUESTIONS  Information for Provider? Ashley with [facility] notifying that   the medication Maksim Yen has been approved .   ---------------------------------------------------------------------------  --------------  Jennifer LEONARDO  What is the best way for the office to contact you? OK to leave message on   voicemail  Preferred Call Back Phone Number? [phone]  ---------------------------------------------------------------------------  --------------  SCRIPT ANSWERS  Relationship to Patient? Third Party  Representative Name?  Yolis Muniz

## 2021-09-03 ENCOUNTER — HOSPITAL ENCOUNTER (OUTPATIENT)
Dept: PHYSICAL THERAPY | Facility: CLINIC | Age: 66
Setting detail: THERAPIES SERIES
Discharge: HOME | End: 2021-09-03
Payer: COMMERCIAL

## 2021-09-03 PROCEDURE — 97161 PT EVAL LOW COMPLEX 20 MIN: CPT

## 2021-09-03 PROCEDURE — 97110 THERAPEUTIC EXERCISES: CPT

## 2021-09-03 NOTE — CARE COORDINATION
[] [facility]  [facility]  [address]  [phone]  F: [phone] [] [address] [facility]  [facility] [address]   [address]  P: [phone]  F: [phone] [] [place]  [address]  P: [phone]  F: [phone] [x] [facility]patient [facility] &  Therapy  [facility]   [address]   [place]: [phone]  F: [phone]     THERAPY RESPONSIBILITY OF CARE TRANSFER FORM       PATIENT NAME: Raissa Wild  MRN: [id number]   : 1955      TRANSFERRING FACILITY:    [] [facility]   [] [facility]   []  [facility]   [] [facility]   [] [facility]   [x] [facility]   [] [facility]  [] [facility] Mon[facility]   [] Other:       ACCEPTING FACILITY   [x] [facility]   [] [facility]   []  [facility]   [] [facility]   [] [facility]   [] [facility]   [] [facility]  [] [facility]   [] Other:          REASON FOR TRANSFER: Patient would like aquatic therapy to address her symptoms. TRANSFER OF CARE:    I am transferring the care of the above patient to: MARIA ALEJANDRA Ash PT  9/3/2021      ACCEPTANCE OF CARE:     I am accepting the care of the above patient.  Neelam Doran PT

## 2021-09-03 NOTE — CONSULTS
[] [facility]) [facility]EL[facility] &  Therapy  [address]  [phone]  F: [phone] [] [address]  [facility]   [address]  P: [phone]  F: [phone] [] [address] &  Therapy  [address]  P: [phone]  F: [phone] [] [address]  P: [phone]  F: [phone] [x] [address]  [address] [address] [facility]   [address]   [place]: [phone]  F: [phone]      Physical Therapy Lower Extremity Evaluation    Date:  9/3/2021  Patient: Beht Tatum   : 1955  MRN: [id number]  Physician: Katiuska White CNP    Insurance: [employer] [place] (20 vs / calendar year, auth req after 20th visit)   Medical Diagnosis: Muscle spasms  Rehab Codes: Y18.812, R53.1  Onset date: 21    Next Dr's appt.: 10/6/21     Subjective:   CC/HPI: 64 y/o female presents with LE muscle cramping and weakness to her LE. Patient reports that the pain and msucle cramping started 1 week after she drove approx 500 miles. She reports that she was completing stairs daily while she was staying at her daughters house, and thought they would be relieved after returning home. Cramping started while she was sleeping. Patient reports that hip pain started bilaterally 3 days ago. Patient reports that her PCP is going to schedule a sleep study. Patient reports that she gets muscle cramping only when she is sleeping at night or while taking naps during the day.         PMHx: [] Unremarkable [] Diabetes [] HTN  [] Pacemaker   [] MI/Heart Problems [] Cancer [x] Arthritis [] Other:              [x] Refer to full medical chart  In EPIC     Past Medical History           Date Comments     Reactive depression [F32.9] 2018            Comorbidities:   [] Obesity [] Dialysis  [x] N/A   [] Asthma/COPD [] Dementia [] Other:   [] Stroke [] Sleep apnea [] Other:   [] Vascular disease [] Rheumatic disease [] Other:     Tests: No imaging of the LE done  [] X-Ray:   [] MRI:    [] Other:    Medications: [x] Refer to full medical record [] None [] Other:  Allergies:      [x] Refer to full medical record  [] None [] Other:    Marital Status  Patient lives with  Lives alone   Home type  Equipment 1 story    Stairs from outside 1 step    Stairs inside --   Employement Retired        ADL/IADL [x] Previously independent with all [x] Currently independent with all Who currently assists the patient with task    [] Previously independent with all except: [] Currently independent with all except:    Bathing  [] Assist [] Assist    Dress/grooming [] Assist [] Assist    Transfer/mobility [] Assist [] Assist    Feeding [] Assist [] Assist    Toileting [] Assist [] Assist    Driving [] Assist [] Assist    Housekeeping [] Assist [] Assist    Grocery shop/meal prep [] Assist [] Assist          Gait Prior level of function Current level of function    [x] Independent  [] Assist [x] Independent  [] Assist   Device: [] Independent [] Independent    [] Straight Cane [] Quad cane [] Straight Cane [] Quad cane    [] Standard walker [] Rolling walker   [] 4 wheeled walker [] Standard walker [] Rolling walker   [] 4 wheeled walker    [] Wheelchair [] Wheelchair       Pain present? Yes    Location Bilateral LE    Pain Rating currently 8/10   Pain at worse 7/10   Pain at best 7/10   Description of pain Cramping   Altered Sensation Intact    What makes it worse Sitting, lying down   What makes it better Stretching, heat, medications   Symptom progression Gradually worsening    Sleep Intermittent waking due to the muscle cramping            Objective:    ROM  ° A/P STRENGTH TESTS (+/-) Left Right Not Tested    Left Right Left Right Ant.  Drawer   [x]   Hip Flex [facility] 4- 4- Post. Drawer   [x]   Ext Dec by 50% Dec by 50% Lachmans   [x]   ER 35 65   Valgus Stress   [x]   IR 45 21   Varus Stress   [x]   ABD   4 4 Maceys   [x]   ADD     Apleys Comp. [x]   Knee Flex WFL  WFL 4- 4- Apleys Dist.   [x]   Ext 1 0 4- 4- Hip Scouring   [x]   Ankle DF 0 2 5 5 GUIDOs   [x]   PF     Piriformis   [x]   INV     Pamelas   [x]   EVER     Talor Tilt   [x]        Pat-Fem Grind   [x]       OBSERVATION No Deficit Deficit Not Tested Comments   Posture       Forward Head [] [x] []    Rounded Shoulders [] [x] []    Kyphosis [] [] [x]    Lordosis [] [] [x]    Lateral Shift [] [x] [] Patient shifted to the L throughout gait    Scoliosis [] [] [x]    Iliac Crest [] [] [x]    PSIS [] [] [x]    ASIS [] [] [x]    Genu Valgus [x] [] []    Genu Varus [x] [] []    Genu Recurvatum [x] [] []    Pronation [] [] [x]    Supination [] [] [x]    Leg Length Discrp [] [] [x]    Slumped Sitting [] [x] []    Palpation [x] [] []    Sensation [x] [] []    Edema [x] [] []    Neurological [x] [] []    Patellar Mobility [x] [] []    Patellar Orientation [x] [] []    Gait [] [x] [] Analysis: Guarded gait with decreased step distance and L trunk weight shifting throughout, otherwise normal and safe          Flexibility Normal Left tight Right tight Comments   Hip flexor [] [x] [x]    quad [] [x] [x] Knee flexion to 90 in prone    HS [] [x] [x] 90/90 test   Lacking 35 on L   Lacking 45 on R   piriformis [x] [] []    ITB [x] [] []    gastroc [] [x] [x] See DF ROM    Soleus  [x] [] []     [] [] []     [] [] []      FUNCTION Normal Difficult Unable   Sitting [x] [] []   Standing [] [x] []   Ambulation [] [x] []   Groom/Dress [x] [] []   Lift/Carry [] [x] []   Stairs [] [x] []   Bending [x] [] []   Squat [] [x] []   Kneel [] [x] []     BALANCE/PROPRIOCEPTION              [x] Not tested   Single leg stance       R                     L                                PAIN   Eyes open                        Sec.            Sec                  .[]    Eyes closed Sec.                  Sec                  . []        FUNCTIONAL TESTS PAIN NO PAIN COMMENTS   Step Test 4 [] []    6 [] []    8 [] []    Squat [] [x]      Functional Test: Optimal Instrument  Score: 31% functionally impaired     Comments:    Assessment: 64 y/o female presents with c/o muscle cramping that starting in June and has been gradually becoming more frequent. Patient has been seeing PCP, but has a referral to neurology in October for a sleep study. Patient demonstrates decreased LE flexibility to her gastroc, hamstring, and hip flexor muscle groups. Patient would like to try aquatic therapy, so she will be transferred to [facility]. Discussed that it may be in the patient's best interested to do 1 session on land and 1 session in the pool so she can learn exercises that she can complete at home. Provided with LE stretches this date. Patient would benefit from skilled physical therapy services in order to: improve LE flexibility and strength, improve mobility, and improve sleep quality to ease ADL's and improve quality of life. Problems:    [x] ? Pain: 7-8/10 bilateral LE   [x] ? ROM: decreased LE flexibility   [x] ? Strength: decreased hip and knee strength bilat   [x] ? Function: 31% impairment on optimal instrument     Goals  MET NOT MET ON-  GOING  Details   Date Addressed:        STG: To be met in 10 treatments           1. ? Pain: Decrease lower extremity pain levels to 4/10 with ADLs []  []  []      2. ? ROM: Increase flexibility and AROM limitations throughout to equal bilat to reduce difficulty with ADLs []  []  []      3. ? Strength: Increase MMT to at least 4+/5 throughout to ease functional limitations and mobility  []  []  []     4. Independent with Home Exercise Programs []  []  []     5. Demonstrate knowledge of fall risk prevention  []  []  []      []  []  []     Date Addressed:        LTG: To be met in 15 treatments       1.  Improve score on assessment tool Optimal Instrument from 31% impairment to less than 10% impairment  []  []  []     2. Reduce lower extremity pain/cramping intensity levels to 2/10 or less with ADLs []  []  []      []  []  []                           Patient goals: no muscle cramping     Rehab Potential:  [x] Good  [] Fair  [] Poor   Suggested Professional Referral:  [x] No  [] Yes:  Barriers to Goal Achievement:  [x] No  [] Yes:  Domestic Concerns:  [x] No  [] Yes:    Pt. Education:  [x] Plans/Goals, Risks/Benefits discussed  [x] Home exercise program    Method of Education: [x] Verbal  [x] Demo  [x] Written  Educated on the [place] location and instructed patient to arrive 30 minutes early to familiarize herself with the area and building. Access Code: [email or web address]  URL: [email or web address]  Date: 09/03/2021  Prepared by: Summer Horvath    Exercises  Modified [place] Abt Stretch - 2 x daily - 7 x weekly - 3 sets - 60 hold  Seated Hamstring Stretch - 2 x daily - 7 x weekly - 3 sets - 30 hold  Seated Calf Stretch with Strap - 2 x daily - 7 x weekly - 3 sets - 30 hold      Comprehension of Education:  [x] Verbalizes understanding. [x] Demonstrates understanding. [x] Needs Review. [] Demonstrates/verbalizes understanding of HEP/Ed previously given.     Treatment Plan:  [x] Therapeutic Exercise   69658  [] Iontophoresis: 4 mg/mL Dexamethasone Sodium Phosphate  mAmin  99143   [] Therapeutic Activity  19460 [] Vasopneumatic cold with compression  24274    [] Gait Training   59771 [] Ultrasound   97275   [] Neuromuscular Re-education  55896 [] Electrical Stimulation Unattended  03573   [x] Manual Therapy  38288 [] Electrical Stimulation Attended  59319   [] Instruction in HEP  [] Lumbar/Cervical Traction  24329   [x] Aquatic Therapy   13086 [] Cold/hotpack    [] Massage   12324      [] Dry Needling, 1 or 2 muscles  91470   [] Biofeedback, first 15 minutes   83892  [] Biofeedback, additional 15 minutes   65764 [] Dry Needling, 3 or more muscles  [id number]     []  Medication allergies reviewed for use of    Dexamethasone Sodium Phosphate 4mg/ml     with iontophoresis treatments. Pt is not allergic. Frequency:  2 x/week for 20 visits    Todays Treatment:  Modalities:   Precautions: Standard   Exercises:  Exercise    Generalized weakness Reps/ Time Weight/ Level Comments         LAND      Supine hip flexor stretch  3x30\"     Seated hamstring stretch  3x30\"     Calf stretch with strap  3x30\"           AQUATICS                          Other:    Specific Instructions for next treatment: progress LE strength and mobility, assess cramping occurrences with exercises       Evaluation Complexity:  History (Personal factors, comorbidities) [x] 0 [] 1-2 [] 3+   Exam (limitations, restrictions) [] 1-2 [] 3 [x] 4+   Clinical presentation (progression) [x] Stable [] Evolving  [] Unstable   Decision Making [x] Low [] Moderate [] High    [x] Low Complexity [] Moderate Complexity [] High Complexity       Treatment Charges: Mins Units   [x] Evaluation       [x]  Low       []  Moderate       []  High 30  1   []  Modalities     [x]  Ther Exercise 10 1   []  Manual Therapy     []  Ther Activities     []  Aquatics     []  Vasocompression     []  Other       TOTAL TREATMENT TIME: 40 min     Time in:14:00   Time Out:14:40     Electronically signed by: Nette Ravi PT        Physician Signature:________________________________Date:__________________  By signing above or cosigning this note, I have reviewed this plan of care and certify a need for medically necessary rehabilitation services.      *PLEASE SIGN ABOVE AND FAX BACK ALL PAGES*

## 2021-09-09 ENCOUNTER — HOSPITAL ENCOUNTER (OUTPATIENT)
Dept: PHYSICAL THERAPY | Facility: CLINIC | Age: 66
Setting detail: THERAPIES SERIES
Discharge: HOME | End: 2021-09-09
Payer: COMMERCIAL

## 2021-09-09 PROCEDURE — 97113 AQUATIC THERAPY/EXERCISES: CPT

## 2021-09-09 NOTE — FLOWSHEET NOTE
[] [facility]) [facility] &  Therapy  [address]  [phone]  F: [phone] [] [address]  [place] [address]   [address]  P: [phone]  F: [phone] [] [place]  [address]  P: [phone]  F: [phone] [] [address]  P: [phone]  F: [phone] [] [address]  [facility]   [address]   [place]: [phone]  F: [phone]      Physical Therapy Daily Treatment Note    Date:  2021  Patient Name:  Kimberli Reddy    :  1955  MRN: [id number]  Physician: Nahid Abdullahi CNP                          Insurance: [employer] (20 vs / calendar year, auth req after  visit)   Medical Diagnosis: Muscle spasms             Rehab Codes: M62.838, R53.1  Onset date: 21                                         Next Dr's appt.: 10/6/21     Visit# / total visits:      Cancels/No Shows:     Subjective:    Pain:  [x] Yes  [] No Location: LB Pain Rating: (0-10 scale) 3/10  Pain altered Tx:  [] No  [] Yes  Action:  Comments: Pt reports feeling okay today. Still getting cramping at night. Interested in massage therapy.      Objective:  Modalities:   Precautions:  KEY  B = Belt G = Gloves P= Paddles   C = Collar K = Kickboard T = Theratube   DB = Dumbells N = Noodle W = Weights     Exercises/Activities  Warm-up/Amb  Dynamic Exercises    Forward 3L March 3L   Sideways 3L Squat    Backwards 3L Retro HS curls      Retro SLR    Stretches  Braiding    Gastroc/Soleus  Heel to Toe amb    Hamstring 3x30\" Toe amb    Hip flexor 3x30\" Heel amb    Piriformis      SKTC      Pec Stretch      Post Deltoid  Static Exercises UE TA contr     Shoulder flex/ext 15   Static Exercises LE  Shoulder abd/add 15 Heel/toe raises 15 Shoulder H.  abd/add 15   Marches 15 Shoulder IR/ER    Mini-squats 15 Rowing    4-way hip  15 Arm Circles    Hamstring curls 15 UT shrugs/rolls    Hip Circles/Fig 8  Scap squeezes    Ankle ROM  Diagonals 1/2    Lunges   Elbow flex/ext      Pron/Sup    Functional Exercise  Wrist AROM    Step      Wall Push-ups  Deep H20/    SLS  Bike 3m   Breast Stroke on Noodle  Hip abd/add    Noodle Twist  Hip flex/ext    Noodle Push down  Hip IR/ER    Kickboard push/pull  Knee flex/ext      Push/pull on [employer] 10m   Other:      Treatment Charges: Mins Units   []  Modalities     []  Ther Exercise     []  Manual Therapy     []  Ther Activities     [x]  Aquatics 30 2   []  Vasocompression     []  Other         Assessment: [x] Progressing toward goals. Initiated aquatic ex as noted in log above with good tolerance. Education provided on proper depth to complete ex in for the most benefit along with demonstration of all. Cues throughout for TA activation to aide in lumbar support as well as to keep ex within pain-free ROM. Ended session with deep water hang followed by stretches for pain relief and to increase tissue extensibility. Plan for 2 days in the pool next week, then will initiate land 1x a week and aquatics 1x a week starting on 9/20/21. Pt verbalizes understanding and is in agreement. [] No change. [] Other:  [x] Patient would continue to benefit from skilled physical therapy services in order to: meet goals listed below    Goals  MET NOT MET ON-  GOING  Details   Date Addressed:            STG: To be met in 10 treatments  ?          1. ? Pain: Decrease lower extremity pain levels to 4/10 with ADLs []? ?  []??  []??      2. ? ROM: Increase flexibility and AROM limitations throughout to equal bilat to reduce difficulty with ADLs []? ?  []??  []??      3. ? Strength: Increase MMT to at least 4+/5 throughout to ease functional limitations and mobility  []? ?  []??  []??      4.  Independent with Home Exercise Programs []? ?  []??  []??      5. Demonstrate knowledge of fall risk prevention  []? ?  []??  []??        []? ?  []??  []??      Date Addressed:            LTG: To be met in 15 treatments           1. Improve score on assessment tool Optimal Instrument from 31% impairment to less than 10% impairment  []??  []??  []??      2. Reduce lower extremity pain/cramping intensity levels to 2/10 or less with ADLs []? ?  []??  []??        []? ?  []??  []??                       Pt. Education:  [x] Yes  [] No  [] Reviewed Prior HEP/Ed  Method of Education: [x] Verbal  [x] Demo  [] Written  Comprehension of Education:  [x] Verbalizes understanding. [] Demonstrates understanding. [] Needs review. [] Demonstrates/verbalizes HEP/Ed previously given. Plan: [x] Continue current frequency toward long and short term goals.     [] Specific Instructions for subsequent treatments:       Time In: 3:30 pm          Time Out: 4:25 pm    Electronically signed by:  Diya Figueredo PTA

## 2021-09-13 ENCOUNTER — HOSPITAL ENCOUNTER (OUTPATIENT)
Dept: PHYSICAL THERAPY | Facility: CLINIC | Age: 66
Setting detail: THERAPIES SERIES
Discharge: HOME | End: 2021-09-13
Payer: COMMERCIAL

## 2021-09-13 ENCOUNTER — TELEPHONE (OUTPATIENT)
Dept: FAMILY MEDICINE CLINIC | Age: 66
End: 2021-09-13

## 2021-09-13 DIAGNOSIS — M62.838 MUSCLE SPASM: Primary | ICD-10-CM

## 2021-09-13 DIAGNOSIS — G25.9 ABNORMAL MOVEMENT OF LOWER EXTREMITY: ICD-10-CM

## 2021-09-13 PROCEDURE — 97113 AQUATIC THERAPY/EXERCISES: CPT

## 2021-09-13 NOTE — FLOWSHEET NOTE
[] [facility]) [facility] &  Therapy  [address]  [phone]  F: [phone] [] [address]  [place] [address]   [address]  P: [phone]  F: [phone] [] [place]  [address]  P: [phone]  F: [phone] [] [address]  P: [phone]  F: [phone] [] [address]  [facility]   [address]   [place]: [phone]  F: [phone]      Physical Therapy Daily Aquatic Treatment Note    Date:  2021  Patient Name:  Teodoro Jimenez    :  1955  MRN: [id number]  Physician: Tyra Dunbar CNP                          Insurance: [employer] (20 vs / calendar year, auth req after  visit)   Medical Diagnosis: Muscle spasms             Rehab Codes: M62.838, R53.1  Onset date: 21                                         Next Dr's appt.: 10/6/21     Visit# / total visits: 3/20     Cancels/No Shows:     Subjective:    Pain:  [x] Yes  [] No Location: LB Pain Rating: (0-10 scale) 3/10  Pain altered Tx:  [] No  [] Yes  Action:  Comments: Pt reports her L knee gave out on Saturday morning and she fell forward. Pt feels her muscle spasms are getting worse and more frequent at night. Pt is unable to get into a Neurologist until the beginning of October. Pt states she felt really good after last aquatic session.     Objective:  Modalities:   Precautions:  KEY  B = Belt G = Gloves P= Paddles   C = Collar K = Kickboard T = Theratube   DB = Dumbells N = Noodle W = Weights     Exercises/Activities  Warm-up/Amb  Dynamic Exercises    Forward 3L March 3L   Sideways 3L Squat    Backwards 3L Retro HS curls      Retro SLR    Stretches  Braiding    Gastroc/Soleus at wall 3x20\" Heel to Toe amb    Hamstring 3x30\" Toe amb    Hip flexor 3x30\" Heel amb    Piriformis      SKTC      Pec Stretch      Post Deltoid  Static Exercises UE TA contr     Shoulder flex/ext 15   Static Exercises LE  Shoulder abd/add 15   Heel/toe raises 15 Shoulder H.  abd/add 15   Marches 15 Shoulder IR/ER 15   Mini-squats 15 Rowing 15   4-way hip  15 Arm Circles    Hamstring curls 15 UT shrugs/rolls    Hip Circles/Fig 8  Scap squeezes    Ankle ROM  Diagonals 1/2    Lunges   Elbow flex/ext      Pron/Sup    Functional Exercise  Wrist AROM    Step      Wall Push-ups  Deep H20/    SLS  Bike 3m   Breast Stroke on Noodle  Hip abd/add    Noodle Twist  Hip flex/ext    Noodle Push down  Hip IR/ER    Kickboard push/pull  Knee flex/ext      Push/pull on [employer] 10m   Other:      Treatment Charges: Mins Units   []  Modalities     []  Ther Exercise     []  Manual Therapy     []  Ther Activities     [x]  Aquatics 30 2   []  Vasocompression     []  Other         Assessment: [x] Progressing toward goals. Cont with aquatic ex per flow sheet with good lacie. VC for proper tech with stretching and ex recall this date. Discussed stretching daily 2-3x and using HP at bedtime to help relax calf muscles. Pt feels good after tx with no pain or soreness. Will cont to monitor sx and progress as lacie. [] No change. [] Other:  [x] Patient would continue to benefit from skilled physical therapy services in order to: meet goals listed below    Goals  MET NOT MET ON-  GOING  Details   Date Addressed:            STG: To be met in 10 treatments  ?          1. ? Pain: Decrease lower extremity pain levels to 4/10 with ADLs []? ?  []??  []??      2. ? ROM: Increase flexibility and AROM limitations throughout to equal bilat to reduce difficulty with ADLs []? ?  []??  []??      3. ? Strength: Increase MMT to at least 4+/5 throughout to ease functional limitations and mobility  []? ?  []??  []??      4. Independent with Home Exercise Programs []? ?  []??  []??      5. Demonstrate knowledge of fall risk prevention  []? ?  []??  []??        []? ?  []??  []??      Date Addressed:            LTG: To be met in 15 treatments           1. Improve score on assessment tool Optimal Instrument from 31% impairment to less than 10% impairment  []??  []??  []??      2. Reduce lower extremity pain/cramping intensity levels to 2/10 or less with ADLs []? ?  []??  []??        []? ?  []??  []??                       Pt. Education:  [x] Yes  [] No  [] Reviewed Prior HEP/Ed  Method of Education: [x] Verbal  [x] Demo  [] Written  Comprehension of Education:  [x] Verbalizes understanding. [] Demonstrates understanding. [] Needs review. [] Demonstrates/verbalizes HEP/Ed previously given. Plan: [x] Continue current frequency toward long and short term goals.     [] Specific Instructions for subsequent treatments:       Time In: 3:55 pm          Time Out: 4:50   pm    Electronically signed by:  Gilbert Dc PTA

## 2021-09-13 NOTE — TELEPHONE ENCOUNTER
----- Message from Mago Sterling sent at 9/13/2021 10:25 AM EDT -----  Subject: Message to Provider    QUESTIONS  Information for Provider? Patient is calling to schedule an appointment   for a Prolia shot . [facility] just needs to know when to send this to   the PCP office. ---------------------------------------------------------------------------  --------------  Shakila LEONARDO  What is the best way for the office to contact you? OK to leave message on   voicemail  Preferred Call Back Phone Number? [phone]  ---------------------------------------------------------------------------  --------------  SCRIPT ANSWERS  Relationship to Patient?  Self

## 2021-09-14 DIAGNOSIS — G47.9 RESTLESS SLEEPER: ICD-10-CM

## 2021-09-14 DIAGNOSIS — R40.0 DAYTIME SLEEPINESS: ICD-10-CM

## 2021-09-14 DIAGNOSIS — M62.838 MUSCLE SPASM: Primary | ICD-10-CM

## 2021-09-14 DIAGNOSIS — G25.9 ABNORMAL MOVEMENT OF LOWER EXTREMITY: ICD-10-CM

## 2021-09-15 ENCOUNTER — TELEPHONE (OUTPATIENT)
Dept: FAMILY MEDICINE CLINIC | Age: 66
End: 2021-09-15

## 2021-09-15 NOTE — TELEPHONE ENCOUNTER
----- Message from Ila Mercer sent at 9/14/2021  5:01 PM EDT -----  Subject: Message to Provider    QUESTIONS  Information for Provider? PT looking to get carissaia shot as soon as   possible  ---------------------------------------------------------------------------  --------------  CALL BACK INFO  What is the best way for the office to contact you? OK to leave message on   voicemail  Preferred Call Back Phone Number? [phone]  ---------------------------------------------------------------------------  --------------  SCRIPT ANSWERS  Relationship to Patient?  Self

## 2021-09-16 ENCOUNTER — HOSPITAL ENCOUNTER (OUTPATIENT)
Dept: PHYSICAL THERAPY | Facility: CLINIC | Age: 66
Setting detail: THERAPIES SERIES
Discharge: HOME | End: 2021-09-16
Payer: COMMERCIAL

## 2021-09-16 PROCEDURE — 97113 AQUATIC THERAPY/EXERCISES: CPT

## 2021-09-16 NOTE — FLOWSHEET NOTE
fall risk prevention  []? ?  []??  []??        []? ?  []??  []??      Date Addressed:            LTG: To be met in 15 treatments           1. Improve score on assessment tool Optimal Instrument from 31% impairment to less than 10% impairment  []??  []??  []??      2. Reduce lower extremity pain/cramping intensity levels to 2/10 or less with ADLs []? ?  []??  []??        []? ?  []??  []??                       Pt. Education:  [x] Yes  [] No  [] Reviewed Prior HEP/Ed  Method of Education: [x] Verbal  [x] Demo  [] Written  Comprehension of Education:  [x] Verbalizes understanding. [] Demonstrates understanding. [] Needs review. [] Demonstrates/verbalizes HEP/Ed previously given. Plan: [x] Continue current frequency toward long and short term goals.     [] Specific Instructions for subsequent treatments:       Time In: 4:00 pm         Time Out: 4:52   pm    Electronically signed by:  Raman Ortega PTA

## 2021-09-20 ENCOUNTER — HOSPITAL ENCOUNTER (OUTPATIENT)
Dept: PHYSICAL THERAPY | Facility: CLINIC | Age: 66
Setting detail: THERAPIES SERIES
Discharge: HOME | End: 2021-09-20
Payer: COMMERCIAL

## 2021-09-20 PROCEDURE — 97110 THERAPEUTIC EXERCISES: CPT

## 2021-09-20 NOTE — FLOWSHEET NOTE
[] [address]  [address]  [phone]  F: [phone] [] [address]  [facility]   [address]  P: [phone]  F: [phone] [x] [address]  [address]  P: [phone]  F: [phone] [] [address]  P: [phone]  F: [phone] [] [address]  [facility]   [address]   Alana Cleaves: [phone]  F: [phone]      Physical Therapy Daily Treatment Note    Date:  2021  Patient Name:  Moises Rosas    :  1955  MRN: [id number]  [facility] [address], [facility]                          C/ Francisco 23 (20 vs / calendar year, auth req after 20th visit)   Medical Diagnosis: Muscle spasms             Rehab Codes: M62.838, R53.1  Onset date: 21                                         Next Dr's appt.: 10/6/21  Visit# / total visits: ; Cancels/No Shows: 0/0    Subjective:    Pain:  [x] Yes  [] No Location: (B) shoulders Pain Rating: (0-10 scale) 4/10  Pain altered Tx:  [x] No  [] Yes  Action:  Comments: Patient reports no changes in status since previous session.  Reports pain within (B) shoulders that increases with donning/doffing shirts    Objective:  Modalities:   Precautions:  Exercises:  Exercise Reps/ Time Weight/ Level Comments   Nustep 10'           SB S 30\"x3     Hamstring S 30\"x3     Prone Quad s 30\"x3           Bridges 2x10     Prone Hip extension 2x10  Assistance from PT   SL hip abd 2x10     SL clamshells 2x10           3-way hip x15 lime    Sit to stands 3x10     Other:      Treatment Charges: Mins Units   []  Modalities     [x]  Ther Exercise 48 3   []  Manual Therapy     []  Ther Activities     []  Aquatics     []  Vasocompression     []  Other Total Treatment time 48 3       Assessment: [x] Progressing toward goals. Initiated land based therapeutic ex during this session per flowsheet . Began session on Nustep in order to increase muscle endurance and warm up muscles. Followed with stretching to (B) gastroc, hamstring and hip flexors in order to increase mobility within (B) LE. Initiated table strengthening ex in order to improve (B) LE strength and stability. Ended session with standing strengthening ex in order to improve functional mobility and (B) LE strength. Patient demonstrated increased difficulty with prone hip extension ex and required assistance from PT in order to lift LE. Patient tolerated all. Patient required multiple rest breaks due to increased fatigue throughout the entire session. Patient tolerated all added ex well with no increase in pain. Patient reports difficulty standing from chair at home. Discussed sitting in chair that is higher off of the ground and does not have a lot of cushion. Patient verbalized good understanding. [] No change. [] Other:  [x] Patient would continue to benefit from skilled physical therapy services in order to: improve LE flexibility and strength, improve mobility, and improve sleep quality to ease ADL's and improve quality of life. Goals  MET NOT MET ON-  GOING  Details   Date Addressed:            STG: To be met in 10 treatments  ?          1. ? Pain: Decrease lower extremity pain levels to 4/10 with ADLs []???  []???  []???      2. ? ROM: Increase flexibility and AROM limitations throughout to equal bilat to reduce difficulty with ADLs []???  []???  []???      3. ? Strength: Increase MMT to at least 4+/5 throughout to ease functional limitations and mobility  []? ??  []???  []???      4. Independent with Home Exercise Programs []???  []???  []???      5. Demonstrate knowledge of fall risk prevention  []???  []???  []???        []? ??  []???  []???      Date Addressed:            LTG: To be met in 15 treatments           1. Improve score on assessment tool Optimal Instrument from 31% impairment to less than 10% impairment  []???  []???  []???      2. Reduce lower extremity pain/cramping intensity levels to 2/10 or less with ADLs []???  []???  []???        []? ??  []???  []???                        Pt. Education:  [x] Yes  [] No  [x] Reviewed Prior HEP/Ed  Access Code: [email or web address]  URL: [email or web address]  Date: 09/20/2021  Prepared by: Wendy Rosario    Exercises  Supine Bridge - 2 x daily - 7 x weekly - 3 sets - 10 reps  Clamshell - 2 x daily - 7 x weekly - 3 sets - 10 reps  Sidelying Hip Abduction - 2 x daily - 7 x weekly - 3 sets - 10 reps  Standing Hip Flexion with Counter Support - 2 x daily - 7 x weekly - 3 sets - 10 reps  Standing Hip Extension with Counter Support - 2 x daily - 7 x weekly - 3 sets - 10 reps  Standing Hip Abduction with Counter Support - 2 x daily - 7 x weekly - 3 sets - 10 reps    Method of Education: [x] Verbal  [x] Demo  [x] Written  Comprehension of Education:  [x] Verbalizes understanding. [x] Demonstrates understanding. [x] Needs review. [] Demonstrates/verbalizes HEP/Ed previously given. Plan: [x] Continue current frequency toward long and short term goals.     [] Specific Instructions for subsequent treatments:       Time In: 3:35 pm           Time Out: 4:30 pm    Electronically signed by:  Wendy Rosario, PT

## 2021-09-21 ENCOUNTER — OFFICE VISIT (OUTPATIENT)
Dept: FAMILY MEDICINE CLINIC | Age: 66
End: 2021-09-21
Payer: COMMERCIAL

## 2021-09-21 VITALS
TEMPERATURE: 97.3 F | BODY MASS INDEX: 22.99 KG/M2 | HEART RATE: 68 BPM | WEIGHT: 138 LBS | HEIGHT: 65 IN | SYSTOLIC BLOOD PRESSURE: 116 MMHG | DIASTOLIC BLOOD PRESSURE: 80 MMHG | OXYGEN SATURATION: 100 %

## 2021-09-21 DIAGNOSIS — R53.1 WEAKNESS: ICD-10-CM

## 2021-09-21 DIAGNOSIS — M62.838 MUSCLE SPASM: ICD-10-CM

## 2021-09-21 DIAGNOSIS — M81.0 OSTEOPOROSIS WITHOUT CURRENT PATHOLOGICAL FRACTURE, UNSPECIFIED OSTEOPOROSIS TYPE: Primary | ICD-10-CM

## 2021-09-21 DIAGNOSIS — Z87.891 PERSONAL HISTORY OF TOBACCO USE: ICD-10-CM

## 2021-09-21 PROCEDURE — 96372 THER/PROPH/DIAG INJ SC/IM: CPT | Performed by: NURSE PRACTITIONER

## 2021-09-21 PROCEDURE — G0296 VISIT TO DETERM LDCT ELIG: HCPCS | Performed by: NURSE PRACTITIONER

## 2021-09-21 PROCEDURE — 99213 OFFICE O/P EST LOW 20 MIN: CPT | Performed by: NURSE PRACTITIONER

## 2021-09-21 PROCEDURE — G8427 DOCREV CUR MEDS BY ELIG CLIN: HCPCS | Performed by: NURSE PRACTITIONER

## 2021-09-21 PROCEDURE — 1036F TOBACCO NON-USER: CPT | Performed by: NURSE PRACTITIONER

## 2021-09-21 PROCEDURE — 3017F COLORECTAL CA SCREEN DOC REV: CPT | Performed by: NURSE PRACTITIONER

## 2021-09-21 PROCEDURE — G8420 CALC BMI NORM PARAMETERS: HCPCS | Performed by: NURSE PRACTITIONER

## 2021-09-21 RX ORDER — MELOXICAM 15 MG/1
TABLET ORAL
COMMUNITY
Start: 2021-08-18 | End: 2022-02-14

## 2021-09-21 ASSESSMENT — ENCOUNTER SYMPTOMS
CONSTIPATION: 0
NAUSEA: 0
DIARRHEA: 0
SHORTNESS OF BREATH: 0
CHEST TIGHTNESS: 0
COUGH: 0

## 2021-09-21 NOTE — PROGRESS NOTES
Kathrene Hodgkin is a 63 y.o. female who presents in office today with Self follow up on recent condition of     Chief Complaint   Patient presents with    1 Month Follow-Up    Orders     ct lung screening     Referral - General     [facility] massage therapy         History of Present Illness:     HPI    Here for follow up and Prolia injection. Has been doing physical therapy - land one day and pool one day. Only 3 episodes last night and less severe than usual. Only using half of the muscle relaxer. Wanting referral for massage therapy. Sleep study ordered but has not scheduled yet. Neurology appointment scheduled 10/6/21. Care gaps: COVID-19 vaccine:   Pfizer x2, April and May 2021  Colon CA screenin  Vaccines:   shingles  Breast CA screening: due    Health Maintenance Due   Topic Date Due    DTaP/Tdap/Td vaccine (1 - Tdap) Never done    Breast cancer screen  Never done    Shingles Vaccine (1 of 2) Never done    Low dose CT lung screening  Never done    Flu vaccine (1) Never done        Patient Care Team:  SOFIA Gates CNP as PCP - General (Nurse Practitioner)  SOFIA Gtaes CNP as PCP - [facility] Empaneled Provider    Reviewed     [x] Past Medical, Family, and Social History was reviewed per writer and does contribute to the patient presenting condition.     [x] Laboratory Results, Vital signs, Imaging, Active Problems, Immunizations, Current/Recently Discontinued Medications, Health Maintenance Activities Due, Referral Notes (if available) were reviewed per writer     [x] Reviewed Depression screening if taken or valid today or any other valid screening tool (others seen below) Interpretation of Total Score DepressionSeverity: 1-4 = Minimal depression, 5-9 = Mild depression, 10-14 = Moderate depression, 15-19 = Moderately severe depression, 20-27 =Severe depression    PHQ Scores 5/10/2021 2021   PHQ2 Score 0 0   PHQ9 Score 0 0     Interpretation of Total Score Depression Severity: 1-4 = Minimal depression, 5-9 = Mild depression, 10-14 = Moderate depression, 15-19 = Moderately severe depression, 20-27 = Severe depression     Review of Systems (Subjective)     Review of Systems   Constitutional: Negative for activity change, appetite change and fatigue. Respiratory: Negative for cough, chest tightness and shortness of breath. Cardiovascular: Negative for chest pain and palpitations. Gastrointestinal: Negative for constipation, diarrhea and nausea. Genitourinary: Negative for difficulty urinating and dysuria. Musculoskeletal: Positive for myalgias. Muscle spasms   Neurological: Positive for weakness. Negative for light-headedness and numbness. See HPI     Physical Assessment (Objective)     /80   Pulse 68   Temp 97.3 °F (36.3 °C)   Ht 5' 5\" (1.651 m)   Wt 138 lb (62.6 kg)   SpO2 100%   BMI 22.96 kg/m²      Physical Exam  Vitals reviewed. Constitutional:       Appearance: She is normal weight. HENT:      Head: Normocephalic and atraumatic. Eyes:      Extraocular Movements: Extraocular movements intact. Conjunctiva/sclera: Conjunctivae normal.   Cardiovascular:      Rate and Rhythm: Normal rate and regular rhythm. Pulses: Normal pulses. Heart sounds: Normal heart sounds. No murmur heard. Pulmonary:      Effort: Pulmonary effort is normal. No respiratory distress. Breath sounds: Normal breath sounds. No wheezing. Abdominal:      General: Bowel sounds are normal.      Palpations: Abdomen is soft. Musculoskeletal:         General: No swelling. Cervical back: Normal range of motion. Skin:     General: Skin is warm and dry. Capillary Refill: Capillary refill takes less than 2 seconds. Neurological:      Mental Status: She is alert and oriented to person, place, and time. Psychiatric:         Mood and Affect: Mood normal.         Behavior: Behavior normal.         Thought Content:  Thought content normal.         Judgment: Judgment normal.         Diagnoses / Plan:   1. Osteoporosis without current pathological fracture, unspecified osteoporosis type  -     denosumab (PROLIA) SC injection 60 mg; 60 mg, SubCUTAneous, ONCE, On Tue 9/21/21 at 1215, For 1 dose  2. Muscle spasm  -     External Referral To Massage Therapy  3. Weakness  -     External Referral To Massage Therapy  4. Personal history of tobacco use  -     AK VISIT TO DISCUSS LUNG CA SCREEN W LDCT  -     CT LUNG SCREENING; Future     Prolia injection next in 6 months. Encouraged healthy diet and exercise. Call office with any new or worsening symptoms or concerns. Return in about 3 months (around 12/21/2021) for Med Check, chronic conditions. Electronically signed by SOFIA Bains CNP on 9/21/2021 at 12:56 PM    Note is dictated utilizing voice recognition software. Unfortunately this leads to occasional typographical errors. Please contact our office if you have any questions. Low Dose CT (LDCT) Lung Screening criteria met   Age 50-69   Pack year smoking >30   Still smoking or less than 15 year since quit   No sign or symptoms of lung cancer   > 11 months since last LDCT     Risks and benefits of lung cancer screening with LDCT scans discussed:    Significance of positive screen - False-positive LDCT results often occur. 95% of all positive results do not lead to a diagnosis of cancer. Usually further imaging can resolve most false-positive results; however, some patients may require invasive procedures. Over diagnosis risk - 10% to 12% of screen-detected lung cancer cases are over diagnosed--that is, the cancer would not have been detected in the patient's lifetime without the screening.     Need for follow up screens annually to continue lung cancer screening effectiveness     Risks associated with radiation from annual LDCT- Radiation exposure is about the same as for a mammogram, which is about 1/3 of the annual background radiation exposure from everyday life. Starting screening at age 54 is not likely to increase cancer risk from radiation exposure. Patients with comorbidities resulting in life expectancy of < 10 years, or that would preclude treatment of an abnormality identified on CT, should not be screened due to lack of benefit.     To obtain maximal benefit from this screening, smoking cessation and long-term abstinence from smoking is critical

## 2021-09-23 ENCOUNTER — HOSPITAL ENCOUNTER (OUTPATIENT)
Dept: PHYSICAL THERAPY | Facility: CLINIC | Age: 66
Setting detail: THERAPIES SERIES
Discharge: HOME | End: 2021-09-23
Payer: COMMERCIAL

## 2021-09-23 PROCEDURE — 97113 AQUATIC THERAPY/EXERCISES: CPT

## 2021-09-23 NOTE — FLOWSHEET NOTE
[] [facility]) [facility] &  Therapy  [address]  [phone]  F: [phone] [] [address]  [place] [address]   [address]  P: [phone]  F: [phone] [] [place]  [address]  P: [phone]  F: [phone] [] [address]  P: [phone]  F: [phone] [] [address]  [facility]   [address]   [place]: [phone]  F: [phone]      Physical Therapy Daily Aquatic Treatment Note    Date:  2021  Patient Name:  Beth Tatum    :  1955  MRN: [id number]  Physician: Katiuska White CNP                          Insurance: [employer] (20 vs / calendar year, auth req after  visit)   Medical Diagnosis: Muscle spasms             Rehab Codes: M62.838, R53.1  Onset date: 21                                         Next Dr's appt.: 10/6/21     Visit# / total visits:      Cancels/No Shows:     Subjective:    Pain:  [x] Yes  [] No Location: LB Pain Rating: (0-10 scale) 5/10 UE, 3/10 LE  Pain altered Tx:  [] No  [] Yes  Action:  Comments: States she had a lot of muscle spasms through the night following her first land visit, but overall is feeling much better since starting PT.   Objective:  Modalities:   Precautions:  KEY  B = Belt G = Gloves P= Paddles   C = Collar K = Kickboard T = Theratube   DB = Dumbells N = Noodle W = Weights     Exercises/Activities  Warm-up/Amb  Dynamic Exercises    Forward 4L March 4L   Sideways 4L Squat    Backwards 4L Retro HS curls      Retro SLR    Stretches  Braiding    Gastroc/Soleus at wall 3x20\" Heel to Toe amb    Hamstring 3x30\" Toe amb    Hip flexor 3x30\" Heel amb    Piriformis      SKTC      Pec Stretch      Post Deltoid  Static Exercises UE TA contr     Shoulder flex/ext 20   Static Exercises LE  Shoulder abd/add 20   Heel/toe raises 20 Shoulder H.  abd/add 20   Marches 20 Shoulder IR/ER 20   Mini-squats 20 Rowing 20   4-way hip  20 Arm Circles    Hamstring curls 20 UT shrugs/rolls    Hip Circles/Fig 8  Scap squeezes    Ankle ROM  Diagonals 1/2    Lunges  10 B Elbow flex/ext      Pron/Sup    Functional Exercise  Wrist AROM    Step 20 fwd     Wall Push-ups 20 Deep H20/    SLS  Bike 3m   Breast Stroke on Noodle  Hip abd/add    Noodle Twist  Hip flex/ext    Noodle Push down  Hip IR/ER    Kickboard push/pull  Knee flex/ext      Push/pull on [employer] 5m   Other:      Treatment Charges: Mins Units   []  Modalities     []  Ther Exercise     []  Manual Therapy     []  Ther Activities     [x]  Aquatics 30 2   []  Vasocompression     []  Other         Assessment: [x] Progressing toward goals. Cont with aquatic ex per flow sheet above with good tolerance. Progressed pt with addition of steps ups and wall push ups. Cues throughout session for ex technique and TA activation. Pt reports feeling good post treatment. [] No change. [] Other:  [x] Patient would continue to benefit from skilled physical therapy services in order to: meet goals listed below    Goals  MET NOT MET ON-  GOING  Details   Date Addressed:            STG: To be met in 10 treatments  ?          1. ? Pain: Decrease lower extremity pain levels to 4/10 with ADLs []? ?  []??  []??      2. ? ROM: Increase flexibility and AROM limitations throughout to equal bilat to reduce difficulty with ADLs []? ?  []??  []??      3. ? Strength: Increase MMT to at least 4+/5 throughout to ease functional limitations and mobility  []? ?  []??  []??      4. Independent with Home Exercise Programs []? ?  []??  []??      5. Demonstrate knowledge of fall risk prevention  []? ?  []??  []??        []? ?  []??  []??      Date Addressed:            LTG: To be met in 15 treatments           1.  Improve score on assessment tool Optimal Instrument from 31% impairment to less than 10% impairment  []??  []??  []??      2. Reduce lower extremity pain/cramping intensity levels to 2/10 or less with ADLs []? ?  []??  []??        []? ?  []??  []??                       Pt. Education:  [x] Yes  [] No  [] Reviewed Prior HEP/Ed  Method of Education: [x] Verbal  [x] Demo  [] Written  Comprehension of Education:  [x] Verbalizes understanding. [] Demonstrates understanding. [] Needs review. [] Demonstrates/verbalizes HEP/Ed previously given. Plan: [x] Continue current frequency toward long and short term goals.     [] Specific Instructions for subsequent treatments:       Time In: 4:00 pm         Time Out: 4:56   pm    Electronically signed by:  Jen Hollis PTA

## 2021-09-28 ENCOUNTER — HOSPITAL ENCOUNTER (OUTPATIENT)
Dept: PHYSICAL THERAPY | Facility: CLINIC | Age: 66
Setting detail: THERAPIES SERIES
Discharge: HOME | End: 2021-09-28
Payer: COMMERCIAL

## 2021-09-28 PROCEDURE — 97110 THERAPEUTIC EXERCISES: CPT

## 2021-09-28 NOTE — FLOWSHEET NOTE
[] [facility]) - [facility] &  Therapy  [address]  [phone]  F: [phone] [] [address]  [place] [address]   [address]  P: [phone]  F: [phone] [x] [address] &  Therapy  [address]  P: [phone]  F: [phone] [] [address]  P: [phone]  F: [phone] [] [address]  [facility]   [address]   [place]: [phone]  F: [phone]      Physical Therapy Daily Treatment Note    Date:  2021  Patient Name:  Nusrat Schafer    :  1955  MRN: [id number]  Kayla Power, XRV                          C/ Francisco 23 (20 vs / calendar year, auth req after 20th visit)   Medical Diagnosis: Muscle spasms             Rehab UKICI: M82.812, O06.4  Onset date: 21                                         Next Dr's appt.: 10/6/21  Visit# / total visits: ;      Cancels/No Shows: 0/0    Subjective:    Pain:  [x] Yes  [] No Location: (B) shoulders Pain Rating: (0-10 scale) 4/10  Pain altered Tx:  [x] No  [] Yes  Action:  Comments: Patient reports no changes in status since previous session.  Reports pain within (B) shoulders that increases with donning/doffing shirts    Objective:  Modalities:   Precautions:  Exercises:  Exercise Reps/ Time Weight/ Level Comments   Nustep 10'           SB S 30\"x3     Hamstring S 30\"x3     Standing Hip flexion S 30\"x3           Bridges 2x10     Prone Hip extension 2x10  Assistance from PT, pillow under hips    SL hip abd 2x10     SL clamshells 2x10           3-way hip x15 lime    Sit to stands 3x10     TG squats 2x10 L15    Other:      Treatment Charges: Mins Units   []  Modalities     [x]  Ther Exercise 50 3   []  Manual Therapy     []  Ther Activities []  Aquatics     []  Vasocompression     []  Other     Total Treatment time 50 3       Assessment: [x] Progressing toward goals. Cont with POC per flowsheet. Began session on nustep in order to increase mobility and endurance. Followed with stretching in order to improve mobility within (B) LE. Added standing hip flexion stretch during this session instead of prone quadriceps stretch due to patient stating increased pain in (R) shoulder with pulling strap. Followed with mat strengthening ex in order to improve strength within (B) hips. Ended session with standing functional ex in order to increase stability for ADLs. Added TG squat on this date in order to further increase quadriceps strength. Patient tolerated all ex well with no increase in leg pain. Monitor patient's (R) shoulder after prone ex completed. [] No change. [] Other:  [x] Patient would continue to benefit from skilled physical therapy services in order to: improve LE flexibility and strength, improve mobility, and improve sleep quality to ease ADL's and improve quality of life. Goals  MET NOT MET ON-  GOING  Details   Date Addressed:            STG: To be met in 10 treatments  ?          1. ? Pain: Decrease lower extremity pain levels to 4/10 with ADLs []???  []???  []???      2. ? ROM: Increase flexibility and AROM limitations throughout to equal bilat to reduce difficulty with ADLs []???  []???  []???      3. ? Strength: Increase MMT to at least 4+/5 throughout to ease functional limitations and mobility  []? ??  []???  []???      4. Independent with Home Exercise Programs []???  []???  []???      5. Demonstrate knowledge of fall risk prevention  []???  []???  []???        []? ??  []???  []???      Date Addressed:            LTG: To be met in 15 treatments           1. Improve score on assessment tool Optimal Instrument from 31% impairment to less than 10% impairment  []???  []???  []???      2.  Reduce lower extremity pain/cramping intensity levels to 2/10 or less with ADLs []???  []???  []???        []? ??  []???  []???                        Pt. Education:  [x] Yes  [] No  [x] Reviewed Prior HEP/Ed  Access Code: [email or web address]  URL: [email or web address]  Date: 09/20/2021  Prepared by: Oneyda Ya    Exercises  Supine Bridge - 2 x daily - 7 x weekly - 3 sets - 10 reps  Clamshell - 2 x daily - 7 x weekly - 3 sets - 10 reps  Sidelying Hip Abduction - 2 x daily - 7 x weekly - 3 sets - 10 reps  Standing Hip Flexion with Counter Support - 2 x daily - 7 x weekly - 3 sets - 10 reps  Standing Hip Extension with Counter Support - 2 x daily - 7 x weekly - 3 sets - 10 reps  Standing Hip Abduction with Counter Support - 2 x daily - 7 x weekly - 3 sets - 10 reps    Method of Education: [x] Verbal  [x] Demo  [x] Written  Comprehension of Education:  [x] Verbalizes understanding. [x] Demonstrates understanding. [x] Needs review. [] Demonstrates/verbalizes HEP/Ed previously given. Plan: [x] Continue current frequency toward long and short term goals.     [] Specific Instructions for subsequent treatments:       Time In: 4:02 pm           Time Out: 4:55 pm    Electronically signed by:  Oneyda Ya, PT

## 2021-09-30 ENCOUNTER — TELEPHONE (OUTPATIENT)
Dept: ONCOLOGY | Age: 66
End: 2021-09-30

## 2021-09-30 ENCOUNTER — HOSPITAL ENCOUNTER (OUTPATIENT)
Dept: PHYSICAL THERAPY | Facility: CLINIC | Age: 66
Setting detail: THERAPIES SERIES
Discharge: HOME | End: 2021-09-30
Payer: COMMERCIAL

## 2021-09-30 PROCEDURE — 97113 AQUATIC THERAPY/EXERCISES: CPT

## 2021-09-30 NOTE — FLOWSHEET NOTE
[] [facility]) [facility] &  Therapy  [address]  [phone]  F: [phone] [] [address]  [place] [address]   [address]  P: [phone]  F: [phone] [] [place]  [address]  P: [phone]  F: [phone] [] [address]  P: [phone]  F: [phone] [] [address]  [facility]   [address]   [place]: [phone]  F: [phone]      Physical Therapy Daily Aquatic Treatment Note    Date:  2021  Patient Name:  Heena Marques    :  1955  MRN: [id number]  Physician: Eliz Fernandez CNP                          Insurance: [employer] (20 vs / calendar year, auth req after  visit)   Medical Diagnosis: Muscle spasms             Rehab Codes: M62.838, R53.1  Onset date: 21                                         Next Dr's appt.: 10/6/21     Visit# / total visits:      Cancels/No Shows:     Subjective:    Pain:  [x] Yes  [] No Location: LB Pain Rating: (0-10 scale) 6/10 UE, 3/10 LE  Pain altered Tx:  [] No  [] Yes  Action:  Comments: States she is still getting muscle spasms at night, but notes they're not as intense. Goes to see neurologist on Wednesday, .    Objective:  Modalities:   Precautions:  KEY  B = Belt G = Gloves P= Paddles   C = Collar K = Kickboard T = Theratube   DB = Dumbells N = Noodle W = Weights     Exercises/Activities  Warm-up/Amb  Dynamic Exercises    Forward 4L March 4L   Sideways 4L Squat    Backwards 4L Retro HS curls      Retro SLR    Stretches  Braiding    Gastroc/Soleus at wall 3x20\" Heel to Toe amb    Hamstring 3x30\" Toe amb    Hip flexor 3x30\" Heel amb    Piriformis      SKTC      Pec Stretch      Post Deltoid  Static Exercises UE TA contr, in 15 treatments           1. Improve score on assessment tool Optimal Instrument from 31% impairment to less than 10% impairment  []??  []??  []??      2. Reduce lower extremity pain/cramping intensity levels to 2/10 or less with ADLs []? ?  []??  []??        []? ?  []??  []??                       Pt. Education:  [x] Yes  [] No  [] Reviewed Prior HEP/Ed  Method of Education: [x] Verbal  [x] Demo  [] Written  Comprehension of Education:  [x] Verbalizes understanding. [] Demonstrates understanding. [] Needs review. [] Demonstrates/verbalizes HEP/Ed previously given. Plan: [x] Continue current frequency toward long and short term goals.     [] Specific Instructions for subsequent treatments:       Time In: 3:30 pm         Time Out: 4:30    Electronically signed by:  Arabella Mcmahon PTA

## 2021-10-05 ENCOUNTER — HOSPITAL ENCOUNTER (OUTPATIENT)
Dept: PHYSICAL THERAPY | Facility: CLINIC | Age: 66
Setting detail: THERAPIES SERIES
Discharge: HOME | End: 2021-10-05
Payer: COMMERCIAL

## 2021-10-05 PROCEDURE — 97110 THERAPEUTIC EXERCISES: CPT

## 2021-10-05 NOTE — PROGRESS NOTES
[] [address]  [address]  [phone]  F: [phone] [] [address]  [place] [address]   [address]  P: [phone]  F: [phone] [x] [place]  [address]  P: [phone]  F: [phone] [] [address]  P: [phone]  F: [phone] [] [address]  [facility]   [address]   [place]: [phone]  F: [phone]      Physical Therapy Daily Treatment Note/Progress Note    Date:  10/5/2021  Patient Name:  Dave Rosado    :  1955  MRN: [id number]  Francisco Javier Ang, VZK                          C/ Francisco 23 (20 vs / calendar year, auth req after 20th visit)   Medical Diagnosis: Muscle spasms             Rehab YCNXA: X99.625, H38.5  Onset date: 21                                         Next Dr's appt.: 10/6/21  Visit# / total visits: ; Cancels/No Shows: 0/0    Subjective:    Pain:  [x] Yes  [] No Location: (B) shoulders Pain Rating: (0-10 scale) 4/10  Pain altered Tx:  [x] No  [] Yes  Action:  Comments: Patient reports she woke up with a lot pain. Patient states that she took Advil and pain has subsided. Patient states that pain has not changed.       Objective:   ROM  ° A/P STRENGTH     Left Right Left Right   Hip Flex [facility] WFL 4 4-   Ext Dec by 25% Dec by 25%        ER 35 65       IR 45 45       ABD     4+ 4+   ADD           Knee Flex WFL  WFL 4- 4   Ext 1 0 4+ 4+   Ankle DF 0 2 5 5   PF           INV           EVER                           Modalities:   Precautions:  Exercises:  Exercise Reps/ Time Weight/ Level Comments    Nustep 10'   x          SB S 30\"x3   x   Hamstring S 30\"x3   x   Standing Hip flexion S 30\"x3   x          Bridges 2x10   x Prone Hip extension 2x10  Assistance from PT, pillow under hips     SL hip abd 2x10 lime  x   SL clamshells 2x10 lime  x          3-way hip x15 lime  x   Sit to stands 3x10   x   TG squats 2x10 L16  x   Step ups: fwd x20ea 6\"  x   Other:      Treatment Charges: Mins Units   []  Modalities     [x]  Ther Exercise 56 4   []  Manual Therapy     []  Ther Activities     []  Aquatics     []  Vasocompression     []  Other     Total Treatment time 56 4       Assessment: [x] Progressing toward goals. Reassessed patient's goals on this date with good progress toward short term goals. Patient met 3/5 short term goals at this time. Patient demonstrates improvement in pain tolerance, (R) LE ROM and (B) LE strength. Patient would continue to benefit from skilled physical therapy in order to improve (B) LE strength, (B) LE ROM and pain tolerance. Plan to continue with current POC of mobility and strengthening within (B) LE. Began session on Nustep in order to increase endurance and warm up muscles. Followed with stretches to (B) gastroc, HS and hip flexors in order to increase mobility within (B) LE. Continued with (B) LE strengthening ex in order to improve stability and decrease risk of falling. Held prone hip extension on this date due to patient c/o shoulder pain when in prone. Added step ups on this date to further improve hip strength and stability. Increase resistance with mat strengthening ex this date. Patient tolerated all ex well with no increase in pain. [] No change. [] Other:  [x] Patient would continue to benefit from skilled physical therapy services in order to: improve LE flexibility and strength, improve mobility, and improve sleep quality to ease ADL's and improve quality of life. Goals  MET NOT MET ON-  GOING  Details   Date Addressed: 10/5/21            STG: To be met in 10 treatments  ?          1. ? Pain: Decrease lower extremity pain levels to 4/10 with ADLs [x]? ??  []???  []???      2. ? ROM: Increase flexibility and AROM limitations throughout to equal bilat to reduce difficulty with ADLs []???  []???  [x]? ??      3. ? Strength: Increase MMT to at least 4+/5 throughout to ease functional limitations and mobility  []? ??  []???  [x]? ??      4. Independent with Home Exercise Programs [x]? ??  []???  []???  1x./day    5. Demonstrate knowledge of fall risk prevention  [x]? ??  []???  []???        []? ??  []???  []???      Date Addressed:            LTG: To be met in 15 treatments           1. Improve score on assessment tool Optimal Instrument from 31% impairment to less than 10% impairment  []???  []???  []???      2. Reduce lower extremity pain/cramping intensity levels to 2/10 or less with ADLs []???  []???  []???        []? ??  []???  []???                        Pt. Education:  [x] Yes  [] No  [x] Reviewed Prior HEP/Ed  Access Code: [email or web address]  URL: [email or web address]  Date: 09/20/2021  Prepared by: Emerita Garsia    Exercises  Supine Bridge - 2 x daily - 7 x weekly - 3 sets - 10 reps  Clamshell - 2 x daily - 7 x weekly - 3 sets - 10 reps  Sidelying Hip Abduction - 2 x daily - 7 x weekly - 3 sets - 10 reps  Standing Hip Flexion with Counter Support - 2 x daily - 7 x weekly - 3 sets - 10 reps  Standing Hip Extension with Counter Support - 2 x daily - 7 x weekly - 3 sets - 10 reps  Standing Hip Abduction with Counter Support - 2 x daily - 7 x weekly - 3 sets - 10 reps    Method of Education: [x] Verbal  [x] Demo  [x] Written  Comprehension of Education:  [x] Verbalizes understanding. [x] Demonstrates understanding. [x] Needs review. [] Demonstrates/verbalizes HEP/Ed previously given. Plan: [x] Continue current frequency toward long and short term goals.     [] Specific Instructions for subsequent treatments:       Time In: 4:00 pm           Time Out: 4:58 pm    Electronically signed by:  Emerita Garsia, PT

## 2021-10-06 ENCOUNTER — OFFICE VISIT (OUTPATIENT)
Dept: NEUROLOGY | Age: 66
End: 2021-10-06
Payer: COMMERCIAL

## 2021-10-06 VITALS
HEIGHT: 65 IN | SYSTOLIC BLOOD PRESSURE: 129 MMHG | WEIGHT: 134 LBS | DIASTOLIC BLOOD PRESSURE: 75 MMHG | BODY MASS INDEX: 22.33 KG/M2 | HEART RATE: 70 BPM

## 2021-10-06 DIAGNOSIS — M62.838 MUSCLE SPASMS OF BOTH LOWER EXTREMITIES: Primary | ICD-10-CM

## 2021-10-06 PROCEDURE — 3017F COLORECTAL CA SCREEN DOC REV: CPT | Performed by: NURSE PRACTITIONER

## 2021-10-06 PROCEDURE — 99214 OFFICE O/P EST MOD 30 MIN: CPT | Performed by: NURSE PRACTITIONER

## 2021-10-06 PROCEDURE — G8484 FLU IMMUNIZE NO ADMIN: HCPCS | Performed by: NURSE PRACTITIONER

## 2021-10-06 PROCEDURE — G8427 DOCREV CUR MEDS BY ELIG CLIN: HCPCS | Performed by: NURSE PRACTITIONER

## 2021-10-06 PROCEDURE — 4004F PT TOBACCO SCREEN RCVD TLK: CPT | Performed by: NURSE PRACTITIONER

## 2021-10-06 PROCEDURE — G8420 CALC BMI NORM PARAMETERS: HCPCS | Performed by: NURSE PRACTITIONER

## 2021-10-06 RX ORDER — BACLOFEN 10 MG/1
20 TABLET ORAL NIGHTLY
Qty: 60 TABLET | Refills: 5 | Status: SHIPPED | OUTPATIENT
Start: 2021-10-06 | End: 2021-11-18 | Stop reason: ALTCHOICE

## 2021-10-06 NOTE — PROGRESS NOTES
[facility]            [address]          [place], [address] [facility]          Dept: [phone]          Dept Fax: [phone]        MD Virginia Bone MD Gareth Chum, MD Roxy Bond, JONATHAN         New Patient Consultation    10/6/2021    HISTORY OF PRESENT ILLNESS:       I had the pleasure of seeing Ramses Veliz who presents to [facility] neurologic care. The patient presents for evaluation of muscle spasms. The patient is here today reporting she has been having muscle spasms every night for 20 weeks. She states they only happen at night. She states when she wakes up in the morning she has increased muscle stiffness. She states she has increased trouble getting into bed and getting out of bed each day. She takes melatonin and every night her upper and lower extremities shake rapidly. This lasts for 7-8 seconds and she is able to stop it. She states the sleep deprivation is secondary to muscle spasms which keep her up at night. The patient reports she was previously prescribed Gabapentin but stopped due to having bad side effects. She states muscle spasms first occurred after stopping Gabapentin. She states she has back pain which is probable secondary to physical therapy she was receiving. She is receiving both pool and land therapy. She states she is will stop land therapy due to soreness associated after the treatment. She presents with slight trouble walking which is being treated by therapy. She has osteoporosis and osteoarthritis. During her examination, she presented with a spastic gate 3+ reflexes in lower extremities associated with increased muscle tone. Fasciculations  in the left lower calf were seen at today's visit. She states her muscle spasms are visible. She finds it difficult to relax muscles.   The patient reports she feels the left side is weaker than right when walking. The patient reports tightness when walking and feels she is sleep deprived which causes her to walk slower. She has no family history of muscle spasms. She reports having no eye problems. The patient reports she has lost increased amount of weight, 17 lbs in the last 7 weeks which is not associated with lack of appetite. She reports no trauma or related fall. She did mention after going to her daughter's 3-story town house she started having pain after being there. The patient was curious if spasms were associated with removal of vericose veins. She reports trouble swallowing.      Testing reviewed:    CT Head WO Contrast 2021  Impression   At least partially empty sella       No acute intracranial abnormality       PAST MEDICAL HISTORY:         Diagnosis Date    Reactive depression         PAST SURGICAL HISTORY:         Procedure Laterality Date    HYSTERECTOMY, VAGINAL  2007    large fibroids    VARICOSE VEIN SURGERY          SOCIAL HISTORY:     Social History     Socioeconomic History    Marital status: Legally      Spouse name: Not on file    Number of children: Not on file    Years of education: Not on file    Highest education level: Not on file   Occupational History    Not on file   Tobacco Use    Smoking status: Current Every Day Smoker     Packs/day: 0.25     Years: 35.00     Pack years: 8.75     Types: Cigarettes     Last attempt to quit: 2021     Years since quittin.4    Smokeless tobacco: Never Used   Vaping Use    Vaping Use: Never used   Substance and Sexual Activity    Alcohol use: Not Currently    Drug use: Never    Sexual activity: Not on file   Other Topics Concern    Not on file   Social History Narrative    Not on file     Social Determinants of Health     Financial Resource Strain: Low Risk     Difficulty of Paying Living Expenses: Not hard at all   Food Insecurity: Unknown    Worried About [address] in the Last Year: Never true  Ran Out of Food in the Last Year: Not asked   Transportation Needs: No Transportation Needs    Lack of Transportation (Medical): No    Lack of Transportation (Non-Medical):  No   Physical Activity:     Days of Exercise per Week:     Minutes of Exercise per Session:    Stress:     Feeling of Stress :    Social Connections:     Frequency of Communication with Friends and Family:     Frequency of Social Gatherings with Friends and Family:     Attends Oriental orthodox Services:     Active Member of Clubs or Organizations:     Attends Club or Organization Meetings:     Marital Status:    Intimate Partner Violence:     Fear of Current or Ex-Partner:     Emotionally Abused:     Physically Abused:     Sexually Abused:        CURRENT MEDICATIONS:     Current Outpatient Medications   Medication Sig Dispense Refill    baclofen (LIORESAL) 10 MG tablet Take 2 tablets by mouth nightly 60 tablet 5    meloxicam (MOBIC) 15 MG tablet TAKE 1 TABLET BY MOUTH EVERY DAY AS NEEDED WITH A MEAL      denosumab (PROLIA) 60 MG/ML SOSY SC injection Inject 1 mL into the skin once for 1 dose 1 mL 1    fluticasone (FLONASE) 50 MCG/ACT nasal spray SPRAY 1 SPRAY INTO EACH NOSTRIL EVERY DAY 1 Bottle 2    albuterol sulfate  (90 Base) MCG/ACT inhaler Inhale 2 puffs into the lungs every 6 hours as needed for Wheezing 1 Inhaler 2    Multiple Vitamins-Minerals (THERAPEUTIC MULTIVITAMIN-MINERALS) tablet Take 1 tablet by mouth daily      ASPIRIN 81 PO Take by mouth      Ascorbic Acid (VITAMIN C) 250 MG tablet Take 250 mg by mouth daily      vitamin B-12 (CYANOCOBALAMIN) 100 MCG tablet Take 50 mcg by mouth daily       Calcium Carb-Cholecalciferol 600-500 MG-UNIT CAPS Take by mouth      Flax Oil-Fish Oil-Borage Oil (FISH OIL-FLAX OIL-BORAGE OIL) CAPS Take by mouth      folic acid (FOLVITE) 1 MG tablet Take 1 mg by mouth daily      Glucosamine HCl 1000 MG TABS Take by mouth      buPROPion (ZYBAN) 150 MG extended release tablet Take 1 tablet by mouth 2 times daily (Patient not taking: Reported on 8/31/2021) 60 tablet 2     No current facility-administered medications for this visit. ALLERGIES:     Allergies   Allergen Reactions    Codeine     Hydrocodone     Tetracycline     Vicodin [Hydrocodone-Acetaminophen]                           All items selected indicate a positive finding. Those items not selected are negative.   Constitutional [x] Weight loss/gain   [x] Fatigue  [] Fever/Chills   HEENT [] Hearing Loss  [] Visual Disturbance  [] Tinnitus  [] Eye pain   Respiratory [] Shortness of Breath  [] Cough  [] Snoring   Cardiovascular [] Chest Pain  [] Palpitations  [] Lightheaded   GI [] Constipation  [] Diarrhea  [x] Swallowing change  [] Nausea/vomiting    [] Urinary Frequency  [] Urinary Urgency   Musculoskeletal [x] Neck pain  [x] Back pain  [x] Muscle pain  [x] Restless legs   Dermatologic [] Skin changes   Neurologic [] Memory loss/confusion  [] Seizures  [x] Trouble walking or imbalance  [] Dizziness  [x] Sleep disturbance  [x] Weakness  [x] Numbness  [] Tremors  [] Speech Difficulty  [] Headaches  [] Light Sensitivity  [] Sound Sensitivity   Endocrinology []Excessive thirst  []Excessive hunger   Psychiatric [] Anxiety/Depression  [] Hallucination   Allergy/immunology []Hives/environmental allergies   Hematologic/lymph [] Abnormal bleeding  [] Abnormal bruising                   PHYSICAL EXAMINATION:       Vitals:    10/06/21 1525   BP: 129/75   Pulse: 70                                              .                                                                                                    General Appearance:  Alert, cooperative, no signs of distress, appears stated age   Head:  Normocephalic, no signs of trauma   Eyes:  Conjunctiva/corneas clear;  eyelids intact   Ears:  Normal external ear and canals   Nose: Nares normal, mucosa normal, no drainage    Throat: Lips and tongue normal; teeth normal;  gums normal   Neck: Supple, intact flexion, extension and rotation;   trachea midline;  no adenopathy;   thyroid: not enlarged;   no carotid pulse abnormality   Back:   Symmetric, no curvature, ROM adequate   Lungs:   Respirations unlabored   Heart:  Regular rate and rhythm           Extremities: Extremities normal, no cyanosis, no edema   Pulses: Symmetric over head and neck   Skin: Skin color, texture normal, no rashes, no lesions                                     NEUROLOGIC EXAMINATION    Neurologic Exam    Mental status    Alert and oriented x 3; intact memory with no confusion, speech or language problems; no hallucinations or delusions  Fund of information appropriate for level of education    Cranial nerves    II - visual fields intact to confrontation  III, IV, VI - extra-ocular muscles full: no pupillary defect; no JUAN ALBERTO, no nystagmus, no ptosis   V - normal facial sensation                                                               VII - normal facial symmetry                                                             VIII - intact hearing                                                                             IX, X - symmetrical palate                                                                  XI - symmetrical shoulder shrug                                                       XII - tongue midline without atrophy or fasciculation      Motor function    increased muscle tone in legs     5/5 strength bilaterally   Sensory function Intact to light touch, pinprick, vibration, proprioception on all 4 extremities, T4 sensory level   Pain was felt with vibration and palpation of the thoracic spine      Cerebellar Intact fine motor movement. No involuntary movements or tremors. No ataxia or dysmetria on finger to nose or heel to shin testing      Reflex function DTR 2+ on bilateral UE 3+ in lower extremities.  Down going toes bilaterally      Gait                   Spastic gait              Medical Decision Making: Thank you very much for the kind referral of Lee Porras. I look forward to working with you in the neurological care of your patient. The patient's condition is quite concerning. She has bilateral lower extremity spasticity which was rather sudden in onset approximately 5 months ago. Although she denies neck or back pain, with assessment for a sensory level in her spine, there was pain to palpation and vibration at approximately the T4-T6 level. There was no pain in her cervical or lumbar spine. This is concerning for possible demyelinating disease versus malignancy especially with a 17 pound weight loss over the last 2 months. Patient also had evidence of fasciculations in her left lower extremity, but the patient states that it's there in both of her lower extremities well. Obtain EMG of bilateral lower extremities  Obtain an MRI of the cervical and thoracic spine with and without contrast  Start baclofen 10 mg at bedtime daily and titrate to 20 mg as needed  Previous studies ordered by her primary care provider include a normal vitamin B12, folate, hepatitis C antibodies, CPK, lactic acid, and thyroid studies. She had a borderline abnormal hemoglobin A1c  The patient will be seen back in 6 weeks  We'll continue receiving physical therapy    Signed: Cordelia Pinto CNP  Please note that this chart was generated using voice recognition Dragon dictation software. Although every effort was made to ensure the accuracy of this automated transcription, some errors in transcription may have occurred. Provider Attestation: The documentation recorded by the scribe accurately reflects the service I personally performed and the decisions made by myself. Portions of this note were transcribed by a scribe. I personally performed the history, physical exam, and medical decision-making and confirm the accuracy of the information in the transcribed note.      Scribe Attestation:

## 2021-10-07 ENCOUNTER — APPOINTMENT (OUTPATIENT)
Dept: PHYSICAL THERAPY | Facility: CLINIC | Age: 66
End: 2021-10-07
Payer: COMMERCIAL

## 2021-10-08 ENCOUNTER — APPOINTMENT (OUTPATIENT)
Dept: PHYSICAL THERAPY | Facility: CLINIC | Age: 66
End: 2021-10-08
Payer: COMMERCIAL

## 2021-10-08 NOTE — TELEPHONE ENCOUNTER
Obed Callejas called the office this morning stating that she was having issues with the Baclofen. She stated that she took the two pills the first night and slept a lot, waking up feeling very \"groggy and overdosed\". The second night she took only one pill and is still groggy but not as bad this morning. Obed Callejas did say that she has been very nauseated and thrown up her breakfast for the last two mornings. Patient feels that this is from the Baclofen and she is wondering if there is something else she can try. I explained that Segundo Or was off today but I would send this message to her for next week and let her know. Patient verbally stated her understanding.

## 2021-10-11 ENCOUNTER — TELEPHONE (OUTPATIENT)
Dept: NEUROLOGY | Age: 66
End: 2021-10-11

## 2021-10-11 DIAGNOSIS — M62.838 MUSCLE SPASMS OF BOTH LOWER EXTREMITIES: Primary | ICD-10-CM

## 2021-10-11 NOTE — TELEPHONE ENCOUNTER
Received a call from [phone] [facility] scheduling, they needed the EMG order to be changed from clinic performed to hospital or ancillary performed. This was done.

## 2021-10-13 ENCOUNTER — HOSPITAL ENCOUNTER (OUTPATIENT)
Dept: PHYSICAL THERAPY | Facility: CLINIC | Age: 66
Setting detail: THERAPIES SERIES
Discharge: HOME | End: 2021-10-13
Payer: COMMERCIAL

## 2021-10-13 NOTE — FLOWSHEET NOTE
[] [address]  [address]    [phone]  F: [phone]   [] [address]  [facility]   [address]  P: [phone]  F: [phone]  [] [place]  [address] [facility]  P: [phone]  F: [phone] [] [address]  P: [phone]  F: [phone]  [] [address]  [address] [facility] [address]   [address]   [place]: [phone]  F: [phone]   [] [address]  [place]: [phone]   F: [phone]     Physical Therapy Cancel/No Show note    Date: 10/13/2021  Patient: Randy Reilly  : 1955  MRN: [id number]    Cancels/No Shows to date: 0    For today's appointment patient:    [x]  Cancelled    [x] Rescheduled appointment    [] No-show     Reason given by patient:    []  Patient ill    []  Conflicting appointment    [] No transportation      [] Conflict with work    [] No reason given    [] Weather related    [] [id number]    [] Other:      Comments:        [] Next appointment was confirmed    Electronically signed by: Maricruz Bradford

## 2021-10-14 ENCOUNTER — HOSPITAL ENCOUNTER (OUTPATIENT)
Dept: PHYSICAL THERAPY | Facility: CLINIC | Age: 66
Setting detail: THERAPIES SERIES
Discharge: HOME | End: 2021-10-14
Payer: COMMERCIAL

## 2021-10-14 PROCEDURE — 97113 AQUATIC THERAPY/EXERCISES: CPT

## 2021-10-14 NOTE — FLOWSHEET NOTE
[] [facility]) [facility] CENTER &  Therapy  [address]  [phone]  F: [phone] [] [address]  [employer] [address]   [address]  P: [phone]  F: [phone] [] [address]  [address]  P: [phone]  F: [phone] [] [address]  P: [phone]  F: [phone] [] [address]  [facility]   [address]   [place]: [phone]  F: [phone]      Physical Therapy Daily Aquatic Treatment Note    Date:  10/14/2021  Patient Name:  Pancho Davila    :  1955  MRN: [id number]  Physician: Shikha Hernandez CNP                          Insurance: Etha Goltz (20 vs / calendar year, auth req after  visit)   Medical Diagnosis: Muscle spasms             Rehab Codes: M62.838, R53.1  Onset date: 21                                         Next Dr's appt.: 10/6/21     Visit# / total visits:      Cancels/No Shows:  1/0     Subjective:    Pain:  [x] Yes  [] No Location: LB Pain Rating: (0-10 scale) 6/10 UE, 3/10 LE  Pain altered Tx:  [] No  [] Yes  Action:  Comments: Pt reports her pain levels remain the same. Pt reports her neurologist recommended to discontinue land therapy and to continue with aquatic therapy two times a week.      Objective:  Modalities:   Precautions:  KEY  B = Belt G = Gloves P= Paddles   C = Collar K = Kickboard T = Theratube   DB = Dumbells N = Noodle W = Weights     Exercises/Activities  Warm-up/Amb  Dynamic Exercises    Forward 4L March 4L   Sideways 4L Squat    Backwards 4L Retro HS curls      Retro SLR    Stretches  Braiding    Gastroc/Soleus at wall 3x20\" Heel to Toe amb    Hamstring 3x30\" Toe amb    Hip flexor 3x30\" Heel amb    Piriformis      SKTC      Pec Stretch      Post Deltoid  Static Exercises UE TA contr, gloves     Shoulder flex/ext 20   Static Exercises LE  Shoulder abd/add 20   Heel/toe raises 20 Shoulder H.  abd/add 20   Marches 20 Shoulder IR/ER 20   Mini-squats 20 Rowing 20   4-way hip  20 Arm Circles    Hamstring curls 20 UT shrugs/rolls    Hip Circles/Fig 8  Scap squeezes    Ankle ROM  Diagonals 1/2    Lunges  10 B Elbow flex/ext      Pron/Sup    Functional Exercise  Wrist AROM    Step 20 fwd     Wall Push-ups 20 Deep H20/    SLS  Bike 3m   Breast Stroke on Noodle  Hip abd/add    Noodle Twist  Hip flex/ext    Noodle Push down  Hip IR/ER    Kickboard push/pull  Knee flex/ext      Push/pull on [employer] 5m   Other:      Treatment Charges: Mins Units   []  Modalities     []  Ther Exercise     []  Manual Therapy     []  Ther Activities     [x]  Aquatics 30 2   []  Vasocompression     []  Other         Assessment: [x] Progressing toward goals. Pt reports increased LE discomfort post static LE exercises therefore transitioned into LE stretching to reduce tension with positive relief post intervention. Pt continues to require cueing for postural awareness and TrA activation during static UE exercises. Continued to end session with deep water exercises with good tolerance. Pt reports overall slight pain relief post session. Time spent educating pt on proper completion of exercises and stretching at home with verbal understanding. [] No change. [] Other:  [x] Patient would continue to benefit from skilled physical therapy services in order to: meet goals listed below    Goals  MET NOT MET ON-  GOING  Details   Date Addressed:            STG: To be met in 10 treatments  ?          1. ? Pain: Decrease lower extremity pain levels to 4/10 with ADLs []? ?  []??  []??      2. ? ROM: Increase flexibility and AROM limitations throughout to equal bilat to reduce difficulty with ADLs []? ?  []??  []??      3. ? Strength:  Increase MMT to at least 4+/5 throughout to ease functional limitations and mobility  []? ?  []??  []??      4. Independent with Home Exercise Programs []? ?  []??  []??      5. Demonstrate knowledge of fall risk prevention  []? ?  []??  []??        []? ?  []??  []??      Date Addressed:            LTG: To be met in 15 treatments           1. Improve score on assessment tool Optimal Instrument from 31% impairment to less than 10% impairment  []??  []??  []??      2. Reduce lower extremity pain/cramping intensity levels to 2/10 or less with ADLs []? ?  []??  []??        []? ?  []??  []??                       Pt. Education:  [x] Yes  [] No  [] Reviewed Prior HEP/Ed  Method of Education: [x] Verbal  [x] Demo  [] Written  Comprehension of Education:  [x] Verbalizes understanding. [] Demonstrates understanding. [] Needs review. [] Demonstrates/verbalizes HEP/Ed previously given. Plan: [x] Continue current frequency toward long and short term goals.     [] Specific Instructions for subsequent treatments:       Time In: 3:00 pm         Time Out: 4:15    Electronically signed by:  Kervin Ferguson PTA

## 2021-10-18 ENCOUNTER — HOSPITAL ENCOUNTER (OUTPATIENT)
Dept: PHYSICAL THERAPY | Facility: CLINIC | Age: 66
Setting detail: THERAPIES SERIES
Discharge: HOME | End: 2021-10-18
Payer: COMMERCIAL

## 2021-10-18 NOTE — FLOWSHEET NOTE
[] [address]  [address]    [phone]  F: [phone]   [] [address]  [facility] [address]   [address]  P: [phone]  F: [phone]  [] [address]  [address] [facility]  P: [phone]  F: [phone] [] [address]  P: [phone]  F: [phone]  [] [address]  [address] [facility] [address]   [address]   [place]: [phone]  F: [phone]   [] [address]  [place]: [phone]   F: [phone]     Physical Therapy Cancel/No Show note    Date: 10/18/2021  Patient: Denisse German  : 1955  MRN: [id number]    Cancels/No Shows to date: 0    For today's appointment patient:    [x]  Cancelled    [] Rescheduled appointment    [] No-show     Reason given by patient:    []  Patient ill    []  Conflicting appointment    [] No transportation      [] Conflict with work    [x] No reason given    [] Weather related    [] COVID-19    [] Other:      Comments:        [] Next appointment was confirmed    Electronically signed by: Angelica Mcfarland

## 2021-10-20 ENCOUNTER — HOSPITAL ENCOUNTER (OUTPATIENT)
Dept: CT IMAGING | Facility: CLINIC | Age: 66
Discharge: HOME | End: 2021-10-22
Payer: COMMERCIAL

## 2021-10-20 DIAGNOSIS — Z87.891 PERSONAL HISTORY OF TOBACCO USE: ICD-10-CM

## 2021-10-20 PROCEDURE — 71271 CT THORAX LUNG CANCER SCR C-: CPT

## 2021-10-21 ENCOUNTER — HOSPITAL ENCOUNTER (OUTPATIENT)
Dept: PHYSICAL THERAPY | Facility: CLINIC | Age: 66
Setting detail: THERAPIES SERIES
Discharge: HOME | End: 2021-10-21
Payer: COMMERCIAL

## 2021-10-21 PROCEDURE — 97113 AQUATIC THERAPY/EXERCISES: CPT

## 2021-10-21 NOTE — FLOWSHEET NOTE
[] [facility]) [facility] CENTER &  Therapy  [address]  [phone]  F: [phone] [] [address]  [place] [address]   [address]  P: [phone]  F: [phone] [] [place]  [address]  P: [phone]  F: [phone] [] [address]  P: [phone]  F: [phone] [] [address]  [facility]   [address]   [place]: [phone]  F: [phone]      Physical Therapy Daily Aquatic Treatment Note    Date:  10/21/2021  Patient Name:  Pedro Luis Cedillo    :  1955  MRN: [id number]  Physician: Yue Mireles CNP                          Insurance: [employer] (20 vs / calendar year, auth req after  visit)   Medical Diagnosis: Muscle spasms             Rehab Codes: M62.838, R53.1  Onset date: 21                                         Next Dr's appt.: 10/6/21     Visit# / total visits: 10/20     Cancels/No Shows:  1/0     Subjective:    Pain:  [x] Yes  [] No Location: LB Pain Rating: (0-10 scale) 3/10 UE, -/10 LE  Pain altered Tx:  [] No  [] Yes  Action:  Comments: Pt states legs felt a little weak this morning. Having pain in her arms currently. Has 2 MRIs scheduled for Friday, 10/29/21.     Objective:  Modalities:   Precautions:  KEY  B = Belt G = Gloves P= Paddles   C = Collar K = Kickboard T = Theratube   DB = Dumbells N = Noodle W = Weights     Exercises/Activities  Warm-up/Amb 10/21 Dynamic Exercises 10/21   Forward 4L March 4L   Sideways 4L Squat    Backwards 4L Retro HS curls      Retro SLR    Stretches  Braiding    Gastroc/Soleus at wall 3x20\" Heel to Toe amb    Hamstring 3x30\" Toe amb    Hip flexor 3x30\" Heel amb    Piriformis      SKTC      Pec Stretch      Post Deltoid  Static Exercises UE TA contr, gloves     Shoulder flex/ext 20   Static Exercises LE  Shoulder abd/add 20   Heel/toe raises 20 Shoulder H.  abd/add 20   Marches 20 Shoulder IR/ER 20   Mini-squats 20 Rowing 20   4-way hip  20 Arm Circles    Hamstring curls 20 UT shrugs/rolls    Hip Circles/Fig 8  Scap squeezes    Ankle ROM  Diagonals 1/2    Lunges  10 B Elbow flex/ext      Pron/Sup    Functional Exercise  Wrist AROM    Step 20 fwd     Wall Push-ups 20 Deep H20/    SLS  Bike 3m   Breast Stroke on Noodle  Hip abd/add    Noodle Twist  Hip flex/ext    Noodle Push down  Hip IR/ER    Kickboard push/pull  Knee flex/ext      Push/pull on [employer] 5m   Other:      Treatment Charges: Mins Units   []  Modalities     []  Ther Exercise     []  Manual Therapy     []  Ther Activities     [x]  Aquatics 30 2   []  Vasocompression     []  Other         Assessment: [x] Progressing toward goals. Cont with aquatic ex per flow sheet above with good tolerance. Cues provided for TA activation and upright posture. Cont to end with deep water ex followed by stretches for pain relief and muscle extensibility. Pt reports feeling \"a lot better\" post session. [] No change. [] Other:  [x] Patient would continue to benefit from skilled physical therapy services in order to: meet goals listed below    Goals  MET NOT MET ON-  GOING  Details   Date Addressed:            STG: To be met in 10 treatments  ?          1. ? Pain: Decrease lower extremity pain levels to 4/10 with ADLs []? ?  []??  []??      2. ? ROM: Increase flexibility and AROM limitations throughout to equal bilat to reduce difficulty with ADLs []? ?  []??  []??      3. ? Strength: Increase MMT to at least 4+/5 throughout to ease functional limitations and mobility  []? ?  []??  []??      4. Independent with Home Exercise Programs []? ?  []??  []??      5. Demonstrate knowledge of fall risk prevention  []? ?  []??  []??        []? ?  []??  []??      Date Addressed:            LTG: To be met in 15 treatments           1. Improve score on assessment tool Optimal Instrument from 31% impairment to less than 10% impairment  []?? : []??  []??      2. Reduce lower extremity pain/cramping intensity levels to 2/10 or less with ADLs []? ?  []??  []??        []? ?  []??  []??                       Pt. Education:  [x] Yes  [] No  [] Reviewed Prior HEP/Ed  Method of Education: [x] Verbal  [x] Demo  [] Written  Comprehension of Education:  [x] Verbalizes understanding. [] Demonstrates understanding. [] Needs review. [] Demonstrates/verbalizes HEP/Ed previously given. Plan: [x] Continue current frequency toward long and short term goals.     [] Specific Instructions for subsequent treatments:       Time In: 3:00 pm         Time Out: 3:50 pm    Electronically signed by:  Kelli Aase, PTA

## 2021-10-25 ENCOUNTER — HOSPITAL ENCOUNTER (OUTPATIENT)
Dept: PHYSICAL THERAPY | Facility: CLINIC | Age: 66
Setting detail: THERAPIES SERIES
Discharge: HOME | End: 2021-10-25
Payer: COMMERCIAL

## 2021-10-25 PROCEDURE — 97113 AQUATIC THERAPY/EXERCISES: CPT

## 2021-10-25 NOTE — FLOWSHEET NOTE
[] [facility]) [facility] &  Therapy  [address]  [phone]  F: [phone] [] [address]  [employer] [address]   [address]  P: [phone]  F: [phone] [] [address] &  Therapy  [address]  P: [phone]  F: [phone] [] [address]  P: [phone]  F: [phone] [] [address]  [facility]   [address]   [place]: [phone]  F: [phone]      Physical Therapy Daily Aquatic Treatment Note    Date:  10/25/2021  Patient Name:  Nitin Rico    :  1955  MRN: [id number]  Physician: Lawrence Lara CNP                          Insurance: Fredrik Frankel (20 vs / calendar year, auth req after 20th visit)   Medical Diagnosis: Muscle spasms             Rehab Codes: M62.838, R53.1  Onset date: 21                                         Next Dr's appt.: 10/29/21 MRI     Visit# / total visits:      Cancels/No Shows:  2/0     Subjective:    Pain:  [x] Yes  [] No Location: LB Pain Rating: (0-10 scale) 3/10 upper back  Pain altered Tx:  [] No  [] Yes  Action:  Comments: Pt reports feeling stronger overall. Pt notes feeling sore upper back between shoulder blades.     Objective:  Modalities:   Precautions:  KEY  B = Belt G = Gloves P= Paddles   C = Collar K = Kickboard T = Theratube   DB = Dumbells N = Noodle W = Weights     Exercises/Activities  Warm-up/Amb 10/25 Dynamic Exercises 10/25   Forward 4L March 4L   Sideways 4L Squat    Backwards 4L Retro HS curls      Retro SLR    Stretches  Braiding    Gastroc/Soleus at wall 3x20\" Heel to Toe amb    Hamstring 3x30\" Toe amb    Hip flexor 3x30\" Heel amb    Piriformis      SKTC      Pec Stretch      Post Deltoid  Static Exercises UE TA contr, gloves     Shoulder flex/ext 20   Static Exercises LE  Shoulder abd/add 20   Heel/toe raises 20 Shoulder H.  abd/add 20   Marches 20 Shoulder IR/ER 20   Mini-squats 20 Rowing 20   4-way hip  20 Arm Circles    Hamstring curls 20 UT shrugs/rolls    Hip Circles/Fig 8  Scap squeezes    Ankle ROM  Diagonals 1/2    Lunges  10 B Elbow flex/ext      Pron/Sup    Functional Exercise  Wrist AROM    Step 20 fwd     Wall Push-ups 20 Deep H20/    SLS  Bike 3m   Breast Stroke on Noodle  Hip abd/add    Noodle Twist  Hip flex/ext    Noodle Push down  Hip IR/ER    Kickboard push/pull  Knee flex/ext      Push/pull on [employer] 5m   Other:      Treatment Charges: Mins Units   []  Modalities     []  Ther Exercise     []  Manual Therapy     []  Ther Activities     [x]  Aquatics 30 2   []  Vasocompression     []  Other         Assessment: [x] Progressing toward goals. Cont with aquatic ex per flow sheet above with good tolerance. VC needed for ex recall today and proper tech. Pt feels less neck tension overall and feels she walks more upright. To have MRI Friday 10/29/21. Pt feels good after tx with less pain. [] No change. [] Other:  [x] Patient would continue to benefit from skilled physical therapy services in order to: meet goals listed below    Goals  MET NOT MET ON-  GOING  Details   Date Addressed:            STG: To be met in 10 treatments  ?          1. ? Pain: Decrease lower extremity pain levels to 4/10 with ADLs []? ?  []??  []??      2. ? ROM: Increase flexibility and AROM limitations throughout to equal bilat to reduce difficulty with ADLs []? ?  []??  []??      3. ? Strength: Increase MMT to at least 4+/5 throughout to ease functional limitations and mobility  []? ?  []??  []??      4. Independent with Home Exercise Programs []? ?  []??  []??      5. Demonstrate knowledge of fall risk prevention  []? ?  []??  []??        []? ?  []??  []??      Date Addressed:            LTG: To be met in 15 treatments           1.  Improve score on assessment tool Optimal Instrument from 31% impairment to less than 10% impairment  []?? : []??  []??      2. Reduce lower extremity pain/cramping intensity levels to 2/10 or less with ADLs []? ?  []??  []??        []? ?  []??  []??                       Pt. Education:  [x] Yes  [] No  [] Reviewed Prior HEP/Ed  Method of Education: [x] Verbal  [x] Demo  [] Written  Comprehension of Education:  [x] Verbalizes understanding. [] Demonstrates understanding. [] Needs review. [] Demonstrates/verbalizes HEP/Ed previously given. Plan: [x] Continue current frequency toward long and short term goals.     [] Specific Instructions for subsequent treatments:       Time In: 3:00 pm         Time Out: 3:50 pm    Electronically signed by:  Tracy Luu PTA

## 2021-10-26 NOTE — TELEPHONE ENCOUNTER
Dr. Kaden Cervantes this is a patient of Anisha's. Jesus Kimball had suggested switching the patient from Baclofen to Tizanidine due to drowsiness. I spoke with the patient today and she is agreeable to try the Tizandine. Jesus Kimball is currently on vacation this week. Would you be willing to approve a month supply for the patient.   Rx has been generated for you and waiting for your approval or denial.

## 2021-10-26 NOTE — TELEPHONE ENCOUNTER
Call placed back to the patient and this information was given. Patient states that she is currently using Baclofen 5 mg at 6 pm and 5 mg at 11 pm.  She continues to be drowsy with this dose and it is not helping the spasms. Patient was agreeable to try the Tizanidine. I explained that Rachel Blanchard was on vacation this week. I could send the request to Dr. Norris Giordano to see if he would be willing to give a month supply. Patient was told she shouldn't take the Baclofen and Tizanidine together. I did suggest to the patient that she start the Tizanidine when she can stay home and not drive, in case this makes her drowsy as well. Patient verbally stated her understanding.

## 2021-10-28 ENCOUNTER — HOSPITAL ENCOUNTER (OUTPATIENT)
Dept: PHYSICAL THERAPY | Facility: CLINIC | Age: 66
Setting detail: THERAPIES SERIES
Discharge: HOME | End: 2021-10-28
Payer: COMMERCIAL

## 2021-10-28 PROCEDURE — 97113 AQUATIC THERAPY/EXERCISES: CPT

## 2021-10-28 RX ORDER — TIZANIDINE 4 MG/1
TABLET ORAL
Qty: 30 TABLET | Refills: 0 | Status: SHIPPED | OUTPATIENT
Start: 2021-10-28 | End: 2021-12-06

## 2021-10-28 NOTE — FLOWSHEET NOTE
[] [facility]) [facility] &  Therapy  [address]  [phone]  F: [phone] [] [address]  [employer] [address]   [address]  P: [phone]  F: [phone] [] [address] &  Therapy  [address]  P: [phone]  F: [phone] [] [address]  P: [phone]  F: [phone] [] [address]  [facility]   [address]   [place]: [phone]  F: [phone]      Physical Therapy Daily Aquatic Treatment Note    Date:  10/28/2021  Patient Name:  Avril Pino    :  1955  MRN: [id number]  Physician: Meghan Martinez CNP                          Insurance: Emmanuelle Colon (20 vs / calendar year, auth req after  visit)   Medical Diagnosis: Muscle spasms             Rehab Codes: M62.838, R53.1  Onset date: 21                                         Next Dr's appt.: 10/29/21 MRI     Visit# / total visits:      Cancels/No Shows:  2/0     Subjective:    Pain:  [x] Yes  [] No Location: LB Pain Rating: (0-10 scale) 8/10 LB  Pain altered Tx:  [] No  [] Yes  Action:  Comments: Pt states pain has moved from her upper back to her LB and is more painful today.     Objective:  Modalities:   Precautions:  KEY  B = Belt G = Gloves P= Paddles   C = Collar K = Kickboard T = Theratube   DB = Dumbells N = Noodle W = Weights     Exercises/Activities  Warm-up/Amb 10/28 Dynamic Exercises 10/28   Forward 4L March 4L   Sideways 4L Squat    Backwards 4L Retro HS curls      Retro SLR    Stretches  Braiding    Gastroc/Soleus at wall 3x20\" Heel to Toe amb    Hamstring 3x30\" Toe amb    Hip flexor 3x30\" Heel amb    Piriformis      SKTC      Pec Stretch      Post Deltoid  Static Exercises UE TA contr, gloves     Shoulder flex/ext 20   Static Exercises LE  Shoulder abd/add 20   Heel/toe raises 20 Shoulder H.  abd/add 20   Marches 20 Shoulder IR/ER 20   Mini-squats 20 Rowing 20   4-way hip  20 Arm Circles    Hamstring curls 20 UT shrugs/rolls    Hip Circles/Fig 8  Scap squeezes    Ankle ROM  Diagonals 1/2    Lunges  10 B Elbow flex/ext      Pron/Sup    Functional Exercise  Wrist AROM    Step 10 fwd/lat     Wall Push-ups 20 Deep H20/    SLS  Bike 3m   Breast Stroke on Noodle  Hip abd/add 3m   Noodle Twist  Hip flex/ext    Noodle Push down  Hip IR/ER    Kickboard push/pull  Knee flex/ext      Push/pull on [employer] 5m   Other:      Treatment Charges: Mins Units   []  Modalities     []  Ther Exercise     []  Manual Therapy     []  Ther Activities     [x]  Aquatics 30 2   []  Vasocompression     []  Other         Assessment: [x] Progressing toward goals. Cont with aquatic ex per flow sheet above with good tolerance. Frequent cuing this date for upright posture and forward gaze as pt has tendency to look down during ex. Pt reports 3 way hip more challenging with L OKC this date. Added lateral step ups and deep water hip abd/add. Pt reports pain decreased to 3/10 post session. [] No change. [] Other:  [x] Patient would continue to benefit from skilled physical therapy services in order to: meet goals listed below    Goals  MET NOT MET ON-  GOING  Details   Date Addressed:            STG: To be met in 10 treatments  ?          1. ? Pain: Decrease lower extremity pain levels to 4/10 with ADLs []? ?  []??  []??      2. ? ROM: Increase flexibility and AROM limitations throughout to equal bilat to reduce difficulty with ADLs []? ?  []??  []??      3. ? Strength: Increase MMT to at least 4+/5 throughout to ease functional limitations and mobility  []? ?  []??  []??      4. Independent with Home Exercise Programs []? ?  []??  []??      5. Demonstrate knowledge of fall risk prevention  []? ?  []??  []??        []? ?  []??  []??      Date Addressed:          LTG: To be met in 15 treatments           1. Improve score on assessment tool Optimal Instrument from 31% impairment to less than 10% impairment  []?? : []??  []??      2. Reduce lower extremity pain/cramping intensity levels to 2/10 or less with ADLs []? ?  []??  []??        []? ?  []??  []??                       Pt. Education:  [x] Yes  [] No  [] Reviewed Prior HEP/Ed  Method of Education: [x] Verbal  [x] Demo  [] Written  Comprehension of Education:  [x] Verbalizes understanding. [] Demonstrates understanding. [] Needs review. [] Demonstrates/verbalizes HEP/Ed previously given. Plan: [x] Continue current frequency toward long and short term goals.     [] Specific Instructions for subsequent treatments:       Time In: 2:55 pm         Time Out: 3:55 pm    Electronically signed by:  Xenia Mohs, PTA

## 2021-10-29 ENCOUNTER — HOSPITAL ENCOUNTER (OUTPATIENT)
Dept: MRI IMAGING | Facility: CLINIC | Age: 66
Discharge: HOME | End: 2021-10-31
Payer: COMMERCIAL

## 2021-10-29 DIAGNOSIS — M62.838 MUSCLE SPASMS OF BOTH LOWER EXTREMITIES: ICD-10-CM

## 2021-10-29 LAB — POC CREATININE: 0.7 MG/DL (ref 0.6–1.4)

## 2021-10-29 PROCEDURE — 82565 ASSAY OF CREATININE: CPT

## 2021-10-29 PROCEDURE — 72156 MRI NECK SPINE W/O & W/DYE: CPT

## 2021-10-29 PROCEDURE — 2580000003 HC RX 258: Performed by: NURSE PRACTITIONER

## 2021-10-29 PROCEDURE — A9579 GAD-BASE MR CONTRAST NOS,1ML: HCPCS | Performed by: NURSE PRACTITIONER

## 2021-10-29 PROCEDURE — 72157 MRI CHEST SPINE W/O & W/DYE: CPT

## 2021-10-29 PROCEDURE — 6360000004 HC RX CONTRAST MEDICATION: Performed by: NURSE PRACTITIONER

## 2021-10-29 RX ORDER — SODIUM CHLORIDE 0.9 % (FLUSH) 0.9 %
10 SYRINGE (ML) INJECTION ONCE
Status: COMPLETED | OUTPATIENT
Start: 2021-10-29 | End: 2021-10-29

## 2021-10-29 RX ADMIN — SODIUM CHLORIDE, PRESERVATIVE FREE 10 ML: 5 INJECTION INTRAVENOUS at 12:56

## 2021-10-29 RX ADMIN — GADOTERIDOL 10 ML: 279.3 INJECTION, SOLUTION INTRAVENOUS at 12:56

## 2021-11-01 ENCOUNTER — HOSPITAL ENCOUNTER (OUTPATIENT)
Dept: PHYSICAL THERAPY | Facility: CLINIC | Age: 66
Setting detail: THERAPIES SERIES
Discharge: HOME | End: 2021-11-01
Payer: COMMERCIAL

## 2021-11-01 PROCEDURE — 97113 AQUATIC THERAPY/EXERCISES: CPT

## 2021-11-01 NOTE — FLOWSHEET NOTE
[] [facility]) [facility] &  Therapy  [address]  [phone]  F: [phone] [] [address]  [place] [address]   [address]  P: [phone]  F: [phone] [x] [address]  Outpatient Rehabilitation &  Therapy  [address]  P: [phone]  F: [phone] [] [address]  P: [phone]  F: [phone] [] [address]  [facility]   [address]   [place]: [phone]  F: [phone]      Physical Therapy Daily Aquatic Treatment Note    Date:  2021  Patient Name:  Laure Chao    :  1955  MRN: [id number]  Physician: Ambar Payan CNP                          Insurance: Seen (20 vs / calendar year, auth req after 20th visit)   Medical Diagnosis: Muscle spasms             Rehab Codes: M62.838, R53.1  Onset date: 21                                         Next Dr's appt.: 10/29/21 MRI     Visit# / total visits:      Cancels/No Shows:  2/0     Subjective:    Pain:  [x] Yes  [] No Location: LB Pain Rating: (0-10 scale) 2/10 LB  Pain altered Tx:  [] No  [] Yes  Action:  Comments: Pt arrives to therapy stating mild low back pain and continues to report spasms in UE's and LE's mostly just at night. Patient states she had an MRI on Friday and was told she made multiple disk degenerative disease and expressed concern about if she should continue with therapy.      Objective:  Modalities:   Precautions:  KEY  B = Belt G = Gloves P= Paddles   C = Collar K = Kickboard T = Theratube   DB = Dumbells N = Noodle W = Weights     Exercises/Activities  Warm-up/Amb  Dynamic Exercises    Forward 4L March 4L   Sideways 4L Squat    Backwards 4L Retro HS curls      Retro SLR    Stretches  Braiding    Gastroc/Soleus at wall 3x20\" Heel to Toe amb    Hamstring 3x30\" Toe amb    Hip flexor 3x30\" Heel amb    Piriformis      SKTC      Pec Stretch      Post Deltoid  Static Exercises UE TA contr, gloves     Shoulder flex/ext 20   Static Exercises LE  Shoulder abd/add 20   Heel/toe raises 20 Shoulder H.  abd/add 20   Marches 20 Shoulder IR/ER 20   Mini-squats 20 Rowing 20   4-way hip  20 Arm Circles    Hamstring curls 20 UT shrugs/rolls    Hip Circles/Fig 8  Scap squeezes    Ankle ROM  Diagonals 1/2    Lunges  10 B Elbow flex/ext      Pron/Sup    Functional Exercise  Wrist AROM    Step 10 fwd/lat     Wall Push-ups 20 Deep H20/    SLS  Bike 5m   Breast Stroke on Noodle  Hip abd/add 3m   Noodle Twist  Hip flex/ext    Noodle Push down  Hip IR/ER    Kickboard push/pull  Knee flex/ext      Push/pull on [employer] 5m   Other:      Treatment Charges: Mins Units   []  Modalities     []  Ther Exercise     []  Manual Therapy     []  Ther Activities     [x]  Aquatics 35 2   []  Vasocompression     []  Other         Assessment: [x] Progressing toward goals. Continued with aquatic exercise with overall fair tolerance, providing mod to max verbal cues for exercise technique with lateral step ups and gastroc stretch with fair carryover. Educated pt on the benefit of exercising in aquatic environment as it can off load pressure on the spinal disks and promote muscular relaxation to reduce spasms. Increased time to 5 minutes for deep water bike with good carryover. Pt reports a decrease in pain at end of treatment. [] No change. [] Other:  [x] Patient would continue to benefit from skilled physical therapy services in order to: meet goals listed below    Goals  MET NOT MET ON-  GOING  Details   Date Addressed:            STG: To be met in 10 treatments  ?          1. ? Pain: Decrease lower extremity pain levels to 4/10 with ADLs []? ?  []??  []??      2. ? ROM: Increase flexibility and AROM limitations throughout to equal bilat to reduce difficulty with ADLs []? ?  []??  []??      3. ? Strength: Increase MMT to at least 4+/5 throughout to ease functional limitations and mobility  []? ?  []??  []??      4. Independent with Home Exercise Programs []? ?  []??  []??      5. Demonstrate knowledge of fall risk prevention  []? ?  []??  []??        []? ?  []??  []??      Date Addressed:            LTG: To be met in 15 treatments           1. Improve score on assessment tool Optimal Instrument from 31% impairment to less than 10% impairment  []?? : []??  []??      2. Reduce lower extremity pain/cramping intensity levels to 2/10 or less with ADLs []? ?  []??  []??        []? ?  []??  []??                       Pt. Education:  [x] Yes  [] No  [] Reviewed Prior HEP/Ed  Method of Education: [x] Verbal  [x] Demo  [] Written  Comprehension of Education:  [x] Verbalizes understanding. [] Demonstrates understanding. [] Needs review. [] Demonstrates/verbalizes HEP/Ed previously given. Plan: [x] Continue current frequency toward long and short term goals.     [] Specific Instructions for subsequent treatments:       Time In: 2:55 pm         Time Out: 3:55 pm    Electronically signed by:  Padma Barkley PTA

## 2021-11-04 ENCOUNTER — HOSPITAL ENCOUNTER (OUTPATIENT)
Dept: PHYSICAL THERAPY | Facility: CLINIC | Age: 66
Setting detail: THERAPIES SERIES
Discharge: HOME | End: 2021-11-04
Payer: COMMERCIAL

## 2021-11-04 PROCEDURE — 97113 AQUATIC THERAPY/EXERCISES: CPT

## 2021-11-04 NOTE — FLOWSHEET NOTE
[] [facility] [address]  [address]  [phone]  F: [phone] [] [address]  [facility]   [address]  P: [phone]  F: [phone] [x] [address] &  Therapy  [address] [facility]  P: [phone]  F: [phone] [] [address]  P: [phone]  F: [phone] [] [address]  [facility]   [address]   [place]: [phone]  F: [phone]      Physical Therapy Daily Aquatic Treatment Note    Date:  2021  Patient Name:  Denisse German    :  1955  MRN: [id number]  Physician: Elmira Ann CNP                          Insurance: [employer] (20 vs / calendar year, auth req after 20th visit)   Medical Diagnosis: Muscle spasms             Rehab Codes: M62.838, R53.1  Onset date: 21                                         Next Dr's appt.: 10/29/21 MRI     Visit# / total visits: 15/20     Cancels/No Shows:  2/0     Subjective:    Pain:  [x] Yes  [] No Location: LB Pain Rating: (0-10 scale) 3/10 UEs, 0/10 LB  Pain altered Tx:  [] No  [] Yes  Action:  Comments: Had just one muscle spasm last night, which is an improvement. Still has trouble lifting her arms right when she gets out of bed in the morning. Feels stretches are helping with her upper back pain.      Objective:  Modalities:   Precautions:  KEY  B = Belt G = Gloves P= Paddles   C = Collar K = Kickboard T = Theratube   DB = Dumbells N = Noodle W = Weights     Exercises/Activities  Warm-up/Amb  Dynamic Exercises    Forward 4L March 4L   Sideways 4L Squat    Backwards 4L Retro HS curls      Retro SLR    Stretches  Braiding    Gastroc/Soleus at wall 3x20\" Heel to Toe amb    Hamstring 3x30\" Toe amb    Hip flexor 3x30\" Heel amb    Piriformis SKTC      Pec Stretch      Post Deltoid  Static Exercises UE TA contr, gloves     Shoulder flex/ext 20   Static Exercises LE  Shoulder abd/add 20   Heel/toe raises 20 Shoulder H.  abd/add 20   Marches 20 Shoulder IR/ER    Mini-squats 20 Rowing 20   4-way hip  20 Arm Circles 20   Hamstring curls 20 UT shrugs/rolls    Hip Circles/Fig 8  Scap squeezes    Ankle ROM  Diagonals 1/2    Lunges  10 B Elbow flex/ext      Pron/Sup    Functional Exercise  Wrist AROM    Step 10 fwd/lat     Wall Push-ups 20 Deep H20/    SLS  Bike 5m   Breast Stroke on Noodle  Hip abd/add 3m   Noodle Twist  Hip flex/ext    Noodle Push down 20 Hip IR/ER    Kickboard push/pull 20 Knee flex/ext      Push/pull on [employer] 5m   Other:      Treatment Charges: Mins Units   []  Modalities     []  Ther Exercise     []  Manual Therapy     []  Ther Activities     [x]  Aquatics 30 2   []  Vasocompression     []  Other         Assessment: [x] Progressing toward goals. Cont with aquatic ex per flow sheet above with good tolerance. Progressed pt with arm circles, kickboard push/pull, and noodle push down. Increased UE fatigue with new ex however no increase in pain. Cues throughout session for upright posture, and during hip extension to keep knee extended. Cont to end with deep water ex followed by stretches to promote pain relief, muscle relaxation, and flexibility. Pt reports feeling good post session. [] No change. [] Other:  [x] Patient would continue to benefit from skilled physical therapy services in order to: meet goals listed below    Goals  MET NOT MET ON-  GOING  Details   Date Addressed:            STG: To be met in 10 treatments  ?          1. ? Pain: Decrease lower extremity pain levels to 4/10 with ADLs []? ?  []??  []??      2. ? ROM: Increase flexibility and AROM limitations throughout to equal bilat to reduce difficulty with ADLs []? ?  []??  []??      3. ? Strength:  Increase MMT to at least 4+/5 throughout to ease functional limitations and mobility  []? ?  []??  []??      4. Independent with Home Exercise Programs []? ?  []??  []??      5. Demonstrate knowledge of fall risk prevention  []? ?  []??  []??        []? ?  []??  []??      Date Addressed:            LTG: To be met in 15 treatments           1. Improve score on assessment tool Optimal Instrument from 31% impairment to less than 10% impairment  []?? : []??  []??      2. Reduce lower extremity pain/cramping intensity levels to 2/10 or less with ADLs []? ?  []??  []??        []? ?  []??  []??                       Pt. Education:  [x] Yes  [] No  [] Reviewed Prior HEP/Ed  Method of Education: [x] Verbal  [x] Demo  [] Written  Comprehension of Education:  [x] Verbalizes understanding. [] Demonstrates understanding. [] Needs review. [] Demonstrates/verbalizes HEP/Ed previously given. Plan: [x] Continue current frequency toward long and short term goals.     [] Specific Instructions for subsequent treatments:        Time In: 2:50 pm         Time Out: 3:42 pm    Electronically signed by:  Tatianna Douglas PTA

## 2021-11-07 ENCOUNTER — PATIENT MESSAGE (OUTPATIENT)
Dept: FAMILY MEDICINE CLINIC | Age: 66
End: 2021-11-07

## 2021-11-07 DIAGNOSIS — M53.9 MULTILEVEL DEGENERATIVE DISC DISEASE: ICD-10-CM

## 2021-11-07 DIAGNOSIS — M25.50 MULTIPLE JOINT PAIN: Primary | ICD-10-CM

## 2021-11-08 ENCOUNTER — HOSPITAL ENCOUNTER (OUTPATIENT)
Dept: PHYSICAL THERAPY | Facility: CLINIC | Age: 66
Setting detail: THERAPIES SERIES
Discharge: HOME | End: 2021-11-08
Payer: COMMERCIAL

## 2021-11-08 PROCEDURE — 97113 AQUATIC THERAPY/EXERCISES: CPT

## 2021-11-08 NOTE — FLOWSHEET NOTE
[] [facility] &  Therapy  [address]  [phone]  F: [phone] [] [address]  [facility]   [address]  P: [phone]  F: [phone] [x] [address]  Outpatient Rehabilitation &  Therapy  [address]  P: [phone]  F: [phone] [] [address]  P: [phone]  F: [phone] [] [address]  [facility]   [address]   [place]: [phone]  F: [phone]      Physical Therapy Daily Aquatic Treatment Note    Date:  2021  Patient Name:  Heena Marques    :  1955  MRN: [id number]  Physician: Eliz Fernandez CNP                          Insurance: [employer] (20 vs / calendar year, auth req after 20th visit)   Medical Diagnosis: Muscle spasms             Rehab Codes: M62.838, R53.1  Onset date: 21                                         Next Dr's appt.: EMG 22     Visit# / total visits:      Cancels/No Shows:  2/0     Subjective:    Pain:  [] Yes  [x] No Location: LB Pain Rating: (0-10 scale)  0/10  Pain altered Tx:  [] No  [] Yes  Action:  Comments: Pt was very sore after last session and feels she over did it. Pt cont to feel sore in bicep muscles, but stretching helps. Pt asks to reduce ex today due to sig increased soreness after last session. Pt had increased muscle spasms this morning. Pt notes walking more yesterday shopping and feels she may have more spasms after increased amb.     Objective:  Modalities:   Precautions:  KEY  B = Belt G = Gloves P= Paddles   C = Collar K = Kickboard T = Theratube   DB = Dumbells N = Noodle W = Weights     Exercises/Activities  Warm-up/Amb  Dynamic Exercises    Forward 4L March 4L   Sideways 4L Squat    Backwards 4L Retro HS curls      Retro SLR Stretches  Braiding    Gastroc/Soleus at wall 3x20\" Heel to Toe amb    Hamstring 3x30\" Toe amb    Hip flexor 3x30\" Heel amb    Piriformis      SKTC      Pec Stretch      Post Deltoid  Static Exercises UE TA contr, gloves     Shoulder flex/ext 20   Static Exercises LE  Shoulder abd/add 20   Heel/toe raises 20 Shoulder H.  abd/add 20   Marches 20 Shoulder IR/ER    Mini-squats 20 Rowing 20   4-way hip  20 Arm Circles 20   Hamstring curls 20 UT shrugs/rolls    Hip Circles/Fig 8  Scap squeezes    Ankle ROM  Diagonals 1/2    Lunges  held Elbow flex/ext      Pron/Sup    Functional Exercise  Wrist AROM    Step 20 fwd/lat     Wall Push-ups 20 Deep H20/    SLS  Bike 5m   Breast Stroke on Noodle  Hip abd/add 3m   Noodle Twist  Hip flex/ext    Noodle Push down held Hip IR/ER    Kickboard push/pull held Knee flex/ext      Push/pull on [employer] 5m   Other:      Treatment Charges: Mins Units   []  Modalities     []  Ther Exercise     []  Manual Therapy     []  Ther Activities     [x]  Aquatics 30 2   []  Vasocompression     []  Other         Assessment: [x] Progressing toward goals. Cont with aquatic ex per flow sheet with good lacie and held new ex added last session at pt request.  VC for ex recall and proper tech with UE ex and squats. Pt feels good after deep water ex. Will cont to monitor sx and progress ex. [] No change. [] Other:  [x] Patient would continue to benefit from skilled physical therapy services in order to: meet goals listed below    Goals  MET NOT MET ON-  GOING  Details   Date Addressed:            STG: To be met in 10 treatments  ?          1. ? Pain: Decrease lower extremity pain levels to 4/10 with ADLs []? ?  []??  []??      2. ? ROM: Increase flexibility and AROM limitations throughout to equal bilat to reduce difficulty with ADLs []? ?  []??  []??      3. ? Strength: Increase MMT to at least 4+/5 throughout to ease functional limitations and mobility  []? ?  []??  []??      4.  Independent with Home Exercise Programs []? ?  []??  []??      5. Demonstrate knowledge of fall risk prevention  []? ?  []??  []??        []? ?  []??  []??      Date Addressed:            LTG: To be met in 15 treatments           1. Improve score on assessment tool Optimal Instrument from 31% impairment to less than 10% impairment  []?? : []??  []??      2. Reduce lower extremity pain/cramping intensity levels to 2/10 or less with ADLs []? ?  []??  []??        []? ?  []??  []??                       Pt. Education:  [x] Yes  [] No  [] Reviewed Prior HEP/Ed  Method of Education: [x] Verbal  [x] Demo  [] Written  Comprehension of Education:  [x] Verbalizes understanding. [] Demonstrates understanding. [] Needs review. [] Demonstrates/verbalizes HEP/Ed previously given. Plan: [x] Continue current frequency toward long and short term goals.     [] Specific Instructions for subsequent treatments:        Time In: 2:55 pm         Time Out: 3:40 pm    Electronically signed by:  Alexia Rai PTA

## 2021-11-09 RX ORDER — IBUPROFEN 600 MG/1
600 TABLET ORAL 2 TIMES DAILY PRN
Qty: 60 TABLET | Refills: 1 | Status: SHIPPED | OUTPATIENT
Start: 2021-11-09 | End: 2022-02-14 | Stop reason: DRUGHIGH

## 2021-11-11 ENCOUNTER — HOSPITAL ENCOUNTER (OUTPATIENT)
Dept: PHYSICAL THERAPY | Facility: CLINIC | Age: 66
Setting detail: THERAPIES SERIES
Discharge: HOME | End: 2021-11-11
Payer: COMMERCIAL

## 2021-11-11 NOTE — FLOWSHEET NOTE
[] [facility]) [facility] &  Therapy  [address]    [phone]  F: [phone]   [] [address]  [address]   [address]  P: [phone]  F: [phone]  [] [place]  [address]  P: [phone]  F: [phone] [] [address]  P: [phone]  F: [phone]  [] [address]  [address] [facility] [address]   [address]   [place]: [phone]  F: [phone]   [] [place] [address] [facility] [address]  [place]: [phone]   F: [phone]     Physical Therapy Cancel/No Show note    Date: 2021  Patient: Danna Gomez  : 1955  MRN: [id number]    Cancels/No Shows to date:     For today's appointment patient:    [x]  Cancelled    [] Rescheduled appointment    [] No-show     Reason given by patient:    []  Patient ill    []  Conflicting appointment    [] No transportation      [] Conflict with work    [x] No reason given    [] Weather related    [] [id number]    [] Other:      Comments:        [] Next appointment was confirmed    Electronically signed by: Ann Mattson

## 2021-11-15 ENCOUNTER — HOSPITAL ENCOUNTER (OUTPATIENT)
Dept: PHYSICAL THERAPY | Facility: CLINIC | Age: 66
Setting detail: THERAPIES SERIES
Discharge: HOME | End: 2021-11-15
Payer: COMMERCIAL

## 2021-11-15 PROCEDURE — 97113 AQUATIC THERAPY/EXERCISES: CPT

## 2021-11-15 NOTE — FLOWSHEET NOTE
[] [address]  [address]  [phone]  F: [phone] [] [address]  [employer] [address]   [address]  P: [phone]  F: [phone] [x] [address] &  Therapy  [address] [facility] [address]  P: [phone]  F: [phone] [] [address]  P: [phone]  F: [phone] [] [address]  [facility]   [address]   [place]: [phone]  F: [phone]      Physical Therapy Daily Aquatic Treatment Note    Date:  11/15/2021  Patient Name:  Ramses Veliz    :  1955  MRN: [id number]  Physician: Barry Laguna CNP                          Insurance: [employer] (20 vs / calendar year, auth req after 20th visit)   Medical Diagnosis: Muscle spasms             Rehab Codes: M62.838, R53.1  Onset date: 21                                         Next 's appt.: EMG 22, 21      Visit# / total visits:      Cancels/No Shows:  3/0     Subjective:    Pain:  [] Yes  [x] No Location: LB Pain Rating: (0-10 scale)  0/10  Pain altered Tx:  [] No  [] Yes  Action:  Comments: Pt with no pain today and less muscle spasms in the morning the last week. Pt also notes less pain in her arms. Pt noted  reduced dosage of medication and has less side effects now. To follow up with  this  and feels she wants to de done with therapy at this time.     Objective:  Modalities:   Precautions:  KEY  B = Belt G = Gloves P= Paddles   C = Collar K = Kickboard T = Theratube   DB = Dumbells N = Noodle W = Weights     Exercises/Activities  Warm-up/Amb 11/15 Dynamic Exercises 11/15   Forward 4L March 4L   Sideways 4L Squat    Backwards 4L Retro HS curls      Retro SLR    Stretches  Braiding    Gastroc/Soleus at wall 3x20\" Heel to Toe amb    Hamstring 3x30\" Toe amb    Hip flexor 3x30\" Heel amb    Piriformis      SKTC      Pec Stretch      Post Deltoid  Static Exercises UE TA contr, gloves     Shoulder flex/ext 20   Static Exercises LE  Shoulder abd/add 20   Heel/toe raises 20 Shoulder H.  abd/add 20   Marches 20 Shoulder IR/ER    Mini-squats 20 Rowing 20   4-way hip  20 Arm Circles 20   Hamstring curls 20 UT shrugs/rolls    Hip Circles/Fig 8  Scap squeezes    Ankle ROM  Diagonals 1/2    Lunges  held Elbow flex/ext      Pron/Sup    Functional Exercise  Wrist AROM    Step 20 fwd/lat     Wall Push-ups 20 Deep H20/    SLS  Bike 3m   Breast Stroke on Noodle  Hip abd/add 3m   Noodle Twist  Hip flex/ext    Noodle Push down held Hip IR/ER    Kickboard push/pull held Knee flex/ext      Push/pull on [employer] 5m   Other:      Treatment Charges: Mins Units   []  Modalities     []  Ther Exercise     []  Manual Therapy     []  Ther Activities     [x]  Aquatics 30 2   []  Vasocompression     []  Other         Assessment: [x] Progressing toward goals. Cont with aquatic ex per flow sheet with good lacie. VC for ex recall and proper tech for stretching. Discussed cont with HEP and option of TEP. Pt notes not able to afford membership at this time and plans to cont with HEP. Reviewed stretching for HEP. Reviewed goals and fall risks. Pt with good understanding of HEP. [] No change. [] Other:  [x] Patient would continue to benefit from skilled physical therapy services in order to: meet goals listed below    Goals  MET NOT MET ON-  GOING  Details   Date Addressed: 11/15/21           STG: To be met in 10 treatments  ?          1. ? Pain: Decrease lower extremity pain levels to 4/10 with ADLs [x]? ?  []??  []??      2. ? ROM: Increase flexibility and AROM limitations throughout to equal bilat to reduce difficulty with ADLs [x]? ?  []??  []??  Pt demo better calf/HS flexibility and has less tightness.    3. ? Strength:  Increase MMT to

## 2021-11-18 ENCOUNTER — TELEMEDICINE (OUTPATIENT)
Dept: NEUROLOGY | Age: 66
End: 2021-11-18
Payer: COMMERCIAL

## 2021-11-18 DIAGNOSIS — M62.838 MUSCLE SPASMS OF BOTH LOWER EXTREMITIES: Primary | ICD-10-CM

## 2021-11-18 DIAGNOSIS — R20.8 BURNING SENSATION OF FEET: ICD-10-CM

## 2021-11-18 PROCEDURE — G8427 DOCREV CUR MEDS BY ELIG CLIN: HCPCS | Performed by: NURSE PRACTITIONER

## 2021-11-18 PROCEDURE — 99214 OFFICE O/P EST MOD 30 MIN: CPT | Performed by: NURSE PRACTITIONER

## 2021-11-18 PROCEDURE — 3017F COLORECTAL CA SCREEN DOC REV: CPT | Performed by: NURSE PRACTITIONER

## 2021-11-18 NOTE — PROGRESS NOTES
[facility]            [address]          [facility], [address] [facility]          Dept: [phone]          Dept Fax: [phone]    E. Alys Schlatter, MD Delores Pili, MD Ahmed B. Ronelle Bruch, MD Rice Haver, MD Avel Handing, [facility]     TELEHEALTH VISIT       11/18/2021      HISTORY OF PRESENT ILLNESS:       I had the pleasure of seeing Ole Park, who returns for continuing neurologic care. The patient was seen last on October 6, 2020 for treatment of bilateral lower extremity spasticity. The patient has extreme bilateral lower extremity spasticity that was of a sudden onset. MRIs of her cervical and thoracic spine were ordered at her last visit which were normal. She has an EMG of her bilateral lower extremities scheduled for January 2022. She was started on baclofen 10 mg at bedtime daily at her last visit however she stopped taking it due to side effects and tizanidine 4 mg at bedtime daily was started. She reports today that tizanidine has been effective in managing her lower extremity spasticity. She notes that she will have a return in the spasms when wakes up as she takes the medications at bedtime. She has continue to notice fasciculations of her bilateral lower extremities. Denies any difficulty swallowing or chewing or any visual difficulties.         Testing reviewed:    MRI Thoracic Spine W WO Contrast 10/29/2021  Impression   Unremarkable pre and post-contrast MRI of the thoracic spine.         MRI Cervical Spine W WO Contrast 10/29/2021    CT Head WO Contrast 7/26/2021  Impression   At least partially empty sella       No acute intracranial abnormality         PAST MEDICAL HISTORY:         Diagnosis Date    Reactive depression 2018        PAST SURGICAL HISTORY:         Procedure Laterality Date    HYSTERECTOMY, VAGINAL  2007    large fibroids    VARICOSE VEIN SURGERY          SOCIAL HISTORY:     Social History     Socioeconomic History    Marital status: Legally      Spouse name: Not on file    Number of children: Not on file    Years of education: Not on file    Highest education level: Not on file   Occupational History    Not on file   Tobacco Use    Smoking status: Current Every Day Smoker     Packs/day: 0.25     Years: 35.00     Pack years: 8.75     Types: Cigarettes     Last attempt to quit: 2021     Years since quittin.5    Smokeless tobacco: Never Used   Vaping Use    Vaping Use: Never used   Substance and Sexual Activity    Alcohol use: Not Currently    Drug use: Never    Sexual activity: Not on file   Other Topics Concern    Not on file   Social History Narrative    Not on file     Social Determinants of Health     Financial Resource Strain: Low Risk     Difficulty of Paying Living Expenses: Not hard at all   Food Insecurity: Unknown    Worried About [address] in the Last Year: Never true    [address] in the Last Year: Not asked   Transportation Needs: No Transportation Needs    Lack of Transportation (Medical): No    Lack of Transportation (Non-Medical):  No   Physical Activity:     Days of Exercise per Week: Not on file    Minutes of Exercise per Session: Not on file   Stress:     Feeling of Stress : Not on file   Social Connections:     Frequency of Communication with Friends and Family: Not on file    Frequency of Social Gatherings with Friends and Family: Not on file    Attends Islam Services: Not on file    Active Member of Clubs or Organizations: Not on file    Attends Club or Organization Meetings: Not on file    Marital Status: Not on file   Intimate Partner Violence:     Fear of Current or Ex-Partner: Not on file    Emotionally Abused: Not on file    Physically Abused: Not on file    Sexually Abused: Not on file   Housing Stability:     Unable to Pay for Housing in the Last Year: Not on file    Number of [place] in the Last Year: Not on file    Unstable Housing in the Last Year: Not on file       CURRENT MEDICATIONS:     Current Outpatient Medications   Medication Sig Dispense Refill    Handicap Placard MISC by Does not apply route Pt is unable to walk 200 ft without stopping to rest   Duration:  6 months 1 each 0    ibuprofen (ADVIL;MOTRIN) 600 MG tablet Take 1 tablet by mouth 2 times daily as needed for Pain 60 tablet 1    tiZANidine (ZANAFLEX) 4 MG tablet Take one tablet nightly 30 tablet 0    meloxicam (MOBIC) 15 MG tablet TAKE 1 TABLET BY MOUTH EVERY DAY AS NEEDED WITH A MEAL      denosumab (PROLIA) 60 MG/ML SOSY SC injection Inject 1 mL into the skin once for 1 dose 1 mL 1    buPROPion (ZYBAN) 150 MG extended release tablet Take 1 tablet by mouth 2 times daily (Patient not taking: Reported on 8/31/2021) 60 tablet 2    fluticasone (FLONASE) 50 MCG/ACT nasal spray SPRAY 1 SPRAY INTO EACH NOSTRIL EVERY DAY 1 Bottle 2    albuterol sulfate  (90 Base) MCG/ACT inhaler Inhale 2 puffs into the lungs every 6 hours as needed for Wheezing 1 Inhaler 2    Multiple Vitamins-Minerals (THERAPEUTIC MULTIVITAMIN-MINERALS) tablet Take 1 tablet by mouth daily      ASPIRIN 81 PO Take by mouth      Ascorbic Acid (VITAMIN C) 250 MG tablet Take 250 mg by mouth daily      vitamin B-12 (CYANOCOBALAMIN) 100 MCG tablet Take 50 mcg by mouth daily       Calcium Carb-Cholecalciferol 600-500 MG-UNIT CAPS Take by mouth      Flax Oil-Fish Oil-Borage Oil (FISH OIL-FLAX OIL-BORAGE OIL) CAPS Take by mouth      folic acid (FOLVITE) 1 MG tablet Take 1 mg by mouth daily      Glucosamine HCl 1000 MG TABS Take by mouth       No current facility-administered medications for this visit. ALLERGIES:     Allergies   Allergen Reactions    Codeine     Hydrocodone     Tetracycline     Vicodin [Hydrocodone-Acetaminophen]                                  REVIEW OF SYSTEMS        All items selected indicate a positive finding.     Those items not selected are negative.   Constitutional [x] Weight loss/gain   [x] Fatigue  [] Fever/Chills   HEENT [] Hearing Loss  [] Visual Disturbance  [] Tinnitus  [] Eye pain   Respiratory [] Shortness of Breath  [] Cough  [] Snoring   Cardiovascular [] Chest Pain  [] Palpitations  [] Lightheaded   GI [] Constipation  [] Diarrhea  [] Swallowing change  [] Nausea/vomiting    [] Urinary Frequency  [] Urinary Urgency   Musculoskeletal [x] Neck pain  [x] Back pain  [x] Muscle pain  [x] Restless legs   Dermatologic [] Skin changes   Neurologic [] Memory loss/confusion  [] Seizures  [x] Trouble walking or imbalance  [] Dizziness  [x] Sleep disturbance  [x] Weakness  [x] Numbness  [] Tremors  [] Speech Difficulty  [] Headaches  [] Light Sensitivity  [] Sound Sensitivity   Endocrinology []Excessive thirst  []Excessive hunger   Psychiatric [] Anxiety/Depression  [] Hallucination   Allergy/immunology []Hives/environmental allergies   Hematologic/lymph [] Abnormal bleeding  [] Abnormal bruising         PHYSICAL EXAMINATION:                                                .                                                                                                    General Appearance:  Alert, cooperative, no signs of distress, appears stated age   Head:  Normocephalic, no signs of trauma   Eyes:  Conjunctiva/corneas clear;  eyelids intact   Ears:  Normal external ear and canals   Nose: Nares normal, mucosa normal, no drainage    Throat: Lips and tongue normal; teeth normal;  gums normal   Neck: Supple, intact flexion, extension and rotation;   trachea midline;  no adenopathy;   thyroid: not enlarged;   no carotid pulse abnormality   Back:   Symmetric, no curvature, ROM adequate   Lungs:   Respirations unlabored   Heart:  Regular rate and rhythm           Extremities: Extremities normal, no cyanosis, no edema   Pulses: Symmetric over head and neck   Skin: Skin color, texture normal, no rashes, no lesions NEUROLOGIC EXAMINATION    Neurologic Exam  Mental status    Alert and oriented x 3; intact memory with no confusion, speech or language problems; no hallucinations or delusions  Fund of information appropriate for level of education    Cranial nerves    II - visual fields intact to confrontation bilaterally  III, IV, VI - extra-ocular muscles full: no pupillary defect; no JUAN ALBERTO, no nystagmus, no ptosis   V - normal facial sensation                                                               VII - normal facial symmetry                                                             VIII - intact hearing                                                                             IX, X - symmetrical palate                                                                  XI - symmetrical shoulder shrug                                                       XII - tongue midline without atrophy or fasciculation      Motor function  Normal muscle bulk and tone; strength 5/5 on all 4 extremities, no pronator drift      Sensory function Intact to light touch, pinprick, vibration, proprioception on all 4 extremities      Cerebellar Intact fine motor movement. No involuntary movements or tremors. No ataxia or dysmetria on finger to nose or heel to shin testing      Reflex function DTR 2+ on bilateral UE and LE, symmetric. Down going toes bilaterally      Gait                   normal base and arm swing                  Medical Decision Making: This is a telehealth visit that was performed with the originating site at Patient Location: home and Provider Location of [facility]. Verbal consent to participate in video visit was obtained.  Pursuant to the emergency declaration under the [facility], [facility] waiver authority and the [employer] General Act, this Virtual Visit was conducted, with patient's consent, to reduce the patient's risk of exposure to COVID-19 and provide continuity of care for an established/new patient. Services were provided through a video synchronous discussion virtually to substitute for in-person clinic visit. I discussed with the patient the nature of our telehealth visits via interactive/real-time audio/video that:  - I would evaluate the patient and recommend diagnostics and treatments based on my assessment  - Our sessions are not being recorded and that personal health information is protected  - Our team would provide follow up care in person if/when the patient needs it. In summary, your patient, Clarissa [place] exhibits the following, with associated plan:    The patient's condition is quite concerning. She has bilateral lower extremity spasticity which was rather sudden in onset approximately 5 months ago. There was no pain in her cervical or lumbar spine. This is concerning for possible motor neuron disease versus malignancy especially with a 17 pound weight loss over the last 2 months. Patient also had evidence of fasciculations in her left lower extremity, but the patient states that it's there in both of her lower extremities well. Obtain EMG of bilateral lower extremities  If the EMG is normal, we will obtain an MRI of the brain with and without contrast  MRI of the cervical and thoracic spine with and without contrast returned normal   Continue tizanidine 4 mg at bedtime daily  Previous studies ordered by her primary care provider include a normal vitamin B12, folate, hepatitis C antibodies, CPK, lactic acid, and thyroid studies.   She had a borderline abnormal hemoglobin A1c  The patient will be seen back in 6-8 weeks                Signed: Kevin Bose CNP      *Please note that portions of this note were completed with a voice recognition program.  Although every effort was made to insure the accuracy of this automated transcription, some errors in transcription may have occurred, occasionally words and are mis-transcribed    Provider Attestation: The documentation recorded by the scribe accurately reflects the service I personally performed and the decisions made by myself. Portions of this note were transcribed by a scribe. I personally performed the history, physical exam, and medical decision-making and confirm the accuracy of the information in the transcribed note. Scribe Attestation:   By signing my name below, Amirah Jean Baptiste, attest that this documentation has been prepared under the direction and in the presence of Oliverio Urbano CNP.

## 2021-11-19 ENCOUNTER — OFFICE VISIT (OUTPATIENT)
Dept: NEUROLOGY | Age: 66
End: 2021-11-19
Payer: COMMERCIAL

## 2021-11-19 DIAGNOSIS — R25.3 FASCICULATIONS OF MUSCLE: ICD-10-CM

## 2021-11-19 DIAGNOSIS — M62.838 MUSCLE SPASMS OF BOTH LOWER EXTREMITIES: Primary | ICD-10-CM

## 2021-11-19 PROCEDURE — 95886 MUSC TEST DONE W/N TEST COMP: CPT | Performed by: PSYCHIATRY & NEUROLOGY

## 2021-11-19 PROCEDURE — 95909 NRV CNDJ TST 5-6 STUDIES: CPT | Performed by: PSYCHIATRY & NEUROLOGY

## 2021-11-22 NOTE — PROGRESS NOTES
[facility] by [address] [facility] [address] [place] [address], [address] [place] [address]    [phone] phone  [phone] fax          Name: Tani Dunaway Patient ID: [id number]   Gender: Female Date of Exam: 11/19/2021   Age: 61 y YOB: 1955   Height: 5'5\" Weight: 80 Lbs   Referring Physician: Toña Downs CNP   Examining Physician: Kenisha Schultz MD        Patient History:   muscle spams bilateral lower extremities, mostly occurs at night, weakness in bilateral lower extremities, right more so than the left. symptoms have been occuring since June 5, 2021. Motor Nerve Conduction:    Nerve and Site Latency Amplitude Segment Latency  Difference Distance Conduction  Velocity            Peroneal.L         Ankle 5.0 ms 2.2 mV Extensor digitorum brevis-Ankle 5.0 ms 80 mm  m/s   Fibula (head) 12.3 ms 2.1 mV Ankle-Fibula (head) 7.3 ms 292 mm 40 m/s   Tibial.R         Ankle 5.2 ms 9.4 mV Abductor hallucis-Ankle 5.2 ms 80 mm  m/s   Popliteal fossa 14.0 ms 4.4 mV Ankle-Popliteal fossa 8.8 ms 365 mm 41 m/s   Peroneal.R         Ankle 5.0 ms 3.6 mV Extensor digitorum brevis-Ankle 5.0 ms 80 mm  m/s   Fibula (head) 12.1 ms 4.0 mV Ankle-Fibula (head) 7.1 ms 290 mm 41 m/s   Tibial.L         Ankle 6.3 ms 11.6 mV Abductor hallucis-Ankle 6.3 ms 80 mm  m/s   Popliteal fossa 13.7 ms 7.9 mV Ankle-Popliteal fossa 7.4 ms 390 mm 53 m/s       Sensory Nerve Conduction:    Nerve and Site Onset Latency Peak  Latency Amplitude Segment Latency  Difference Distance Conduction  Velocity             Sural.L          Lower leg 3.2 ms 4.3 ms 7 mV Ankle-Lower leg 3.2 ms 140 mm 43 m/s   Sural.R          Lower leg 3.3 ms 4.2 ms 7 mV Ankle-Lower leg 3.3 ms 140 mm 43 m/s          Needle EMG Examination:     Insertional Spontaneous Activity Volitional MUAPs Max Volitional Activity   Muscle Insertional Fibs +Wave Fasc Duration Amplitude Poly Config Recruitment Amplitude Pattern Effort   Tibialis anterior. L Normal 0 0 Low cervical para sp. L Normal None None None           Mid cervical para sp. L Normal None None None           Upper cervical para sp. L Normal None None None           Thoracic paraspinal mid right Increased +1 +1 +1           Thoracic paraspinal mid left Increased +1 +1 +1           Thoracic paraspinal upper right Increased +1 +1 +1           Thoracic paraspinal upper left Increased +1 +1 +1             Interpretation: Bilateral peroneal motor studies showed normal DML's, CMAP amplitudes and motor conduction velocities. Bilateral tibial motor studies showed normal DML's, CMAP amplitudes and motor conduction velocities. Bilateral sural antidromic sensory studies showed normal.  Disease, amplitudes and sensory nerve conduction velocities. Monopolar EMG study of the selected muscles revealed evidence of active denervation in left gastrocnemius medialis, right tibialis anterior, right lumbar paraspinal muscles, bilateral thoracic paraspinal muscles, right triceps brachii and FDI. 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 The differential diagnosis includes polyradiculopathy, a possibility of motor neuron disease cannot be excluded. However the test was somewhat limited due to patient's tolerance to the EMG procedure and repeat testing may be obtained when patient is more cooperative.   Careful clinical correlation recommended.      __________________________________  Jose Antonio Gay MD  Diplomat American Board of Psychiatry and Neurology  [facility]

## 2021-11-30 NOTE — DISCHARGE SUMMARY
[] [facility]) - [facility] &  Therapy  [address]  [phone]  F: [phone] [] [address]  [address]   [address]  P: [phone]  F: [phone] [x] [address] &  Therapy  [address]  P: [phone]  F: [phone] [] [address]  P: [phone]  F: [phone] [] [address]  [address] [facility] [address]   [address]   [place]: [phone]  F: [phone]      Physical Therapy Discharge Note    Date: 2021      Patient: Connor Rod  : 1955  MRN: [id number]    CHANDLER Nielsen (20 vs / calendar year, auth req after 20th visit)   Medical Diagnosis: Muscle spasms             Rehab Codes: M62.838, R53.1  Onset date: 21                                         Next Dr's appt.: EMG 22, 21 Dr. Shar Rosales  Visit# / total visits:                                 Cancels/No Shows:  3    Date of initial visit: 9/3/21                Date of final visit: 11/15/21        Assessment:   Goals  MET NOT MET ON-  GOING  Details   Date Addressed: 11/15/21           STG: To be met in 10 treatments  ?          1. ? Pain: Decrease lower extremity pain levels to 4/10 with ADLs [x]? ??  []???  []???      2. ? ROM: Increase flexibility and AROM limitations throughout to equal bilat to reduce difficulty with ADLs [x]? ??  []???  []???  Pt demo better calf/HS flexibility and has less tightness.    3. ? Strength: Increase MMT to at least 4+/5 throughout to ease functional limitations and mobility  []? ??  []???  [x]? ??  Pt states she has better activity tolerance.    4. Independent with Home Exercise Programs [x]? ??  []???  []???  Reviewed with pt    5.  Demonstrate knowledge of fall risk prevention  [x]? ??  []???  []???   Reviewed with pt     []? ??  []???  []???      Date Addressed:            LTG: To be met in 15 treatments           1. Improve score on assessment tool Optimal Instrument from 31% impairment to less than 10% impairment  []??? : []???  []???      2. Reduce lower extremity pain/cramping intensity levels to 2/10 or less with ADLs [x]? ??  []???  []???   Less cramping at night and upon waking.     []? ??  []???  []???                        Treatment to Date:  [x] Therapeutic Exercise    [] Modalities:  [] Therapeutic Activity    [] Ultrasound  [] Electrical Stimulation  [] Gait Training     [] Massage       [] Lumbar/Cervical Traction  [] Neuromuscular Re-education [] Cold/hotpack [] Iontophoresis: 4 mg/mL  [] Instruction in Home Exercise Program                     Dexamethasone Sodium  [] Manual Therapy             Phosphate 40-80 mAmin  [x] Aquatic Therapy                   [] Vasocompression/    [] Other:             Game Ready    Discharge Status:     [] Pt recovered from conditions. Treatment goals were met. [x] Pt received maximum benefit. No further therapy indicated at this time. [x] Pt to continue exercise/home instructions independently. [] Therapy interrupted due to:    [] Pt has 2 or more no shows/cancels, is discontinued per our policy. [] Pt has completed prescribed number of treatment sessions. [] Other:         Electronically signed by Isael Olivera PT on 11/30/2021 at 3:13 PM      If you have any questions or concerns, please don't hesitate to call.   Thank you for your referral.

## 2021-12-02 NOTE — PROGRESS NOTES
The patient was contacted regarding the results of her EMG.  recommended a referral to a neuromuscular center for possible motor neuron disease. I advised the patient of this, and a referral was made. Because of her continued weight loss, she was advised to contact her primary care provider for a work-up regarding the weight loss.

## 2021-12-04 DIAGNOSIS — M62.838 MUSCLE SPASMS OF BOTH LOWER EXTREMITIES: Primary | ICD-10-CM

## 2021-12-06 ENCOUNTER — TELEPHONE (OUTPATIENT)
Dept: NEUROLOGY | Age: 66
End: 2021-12-06

## 2021-12-06 RX ORDER — TIZANIDINE 4 MG/1
TABLET ORAL
Qty: 30 TABLET | Refills: 1 | Status: SHIPPED | OUTPATIENT
Start: 2021-12-06 | End: 2022-02-14

## 2021-12-06 NOTE — TELEPHONE ENCOUNTER
Pharmacy requesting a  refill of: Tizanidine      Medication active on med list yes     Date of last prescription: 10/28/2021  with 0  refills verified on 12/06/2021  verified by CARMELINA ULLOA     Date of last appointment: 11/18/2021     Next Visit Date:  01/11/2022

## 2021-12-06 NOTE — TELEPHONE ENCOUNTER
Jesus Kimball CNP is referring Pop Sanches to [facility] for possible ALS. Anisha CASTILLO spoke with the pt. on 12/2/21.

## 2022-01-03 ENCOUNTER — TELEMEDICINE (OUTPATIENT)
Dept: FAMILY MEDICINE CLINIC | Age: 67
End: 2022-01-03
Payer: COMMERCIAL

## 2022-01-03 DIAGNOSIS — R63.4 UNINTENDED WEIGHT LOSS: Primary | ICD-10-CM

## 2022-01-03 DIAGNOSIS — J43.9 PULMONARY EMPHYSEMA, UNSPECIFIED EMPHYSEMA TYPE (HCC): ICD-10-CM

## 2022-01-03 DIAGNOSIS — R53.1 GENERALIZED WEAKNESS: ICD-10-CM

## 2022-01-03 PROBLEM — M62.838 NIGHT MUSCLE SPASMS: Status: ACTIVE | Noted: 2021-06-05

## 2022-01-03 PROCEDURE — G8484 FLU IMMUNIZE NO ADMIN: HCPCS | Performed by: NURSE PRACTITIONER

## 2022-01-03 PROCEDURE — 99214 OFFICE O/P EST MOD 30 MIN: CPT | Performed by: NURSE PRACTITIONER

## 2022-01-03 PROCEDURE — 3017F COLORECTAL CA SCREEN DOC REV: CPT | Performed by: NURSE PRACTITIONER

## 2022-01-03 PROCEDURE — G8420 CALC BMI NORM PARAMETERS: HCPCS | Performed by: NURSE PRACTITIONER

## 2022-01-03 PROCEDURE — 3023F SPIROM DOC REV: CPT | Performed by: NURSE PRACTITIONER

## 2022-01-03 PROCEDURE — 4004F PT TOBACCO SCREEN RCVD TLK: CPT | Performed by: NURSE PRACTITIONER

## 2022-01-03 PROCEDURE — G8427 DOCREV CUR MEDS BY ELIG CLIN: HCPCS | Performed by: NURSE PRACTITIONER

## 2022-01-03 RX ORDER — ALBUTEROL SULFATE 90 UG/1
2 AEROSOL, METERED RESPIRATORY (INHALATION) EVERY 6 HOURS PRN
Qty: 1 EACH | Refills: 3 | OUTPATIENT
Start: 2022-01-03

## 2022-01-03 RX ORDER — ALBUTEROL SULFATE 90 UG/1
2 AEROSOL, METERED RESPIRATORY (INHALATION) EVERY 6 HOURS PRN
Qty: 18 G | Refills: 2 | Status: SHIPPED | OUTPATIENT
Start: 2022-01-03

## 2022-01-03 ASSESSMENT — ENCOUNTER SYMPTOMS
BACK PAIN: 0
SHORTNESS OF BREATH: 1
CHEST TIGHTNESS: 0
ABDOMINAL PAIN: 0
WHEEZING: 0
COUGH: 0

## 2022-01-03 NOTE — TELEPHONE ENCOUNTER
Yadi Gallardo is calling to request a refill on the following medication(s):    Last Visit Date (If Applicable):  8/94/1979    Next Visit Date:    1/3/2022    Medication Request:  Requested Prescriptions     Pending Prescriptions Disp Refills    albuterol sulfate  (90 Base) MCG/ACT inhaler 1 each 3     Sig: Inhale 2 puffs into the lungs every 6 hours as needed for Wheezing

## 2022-01-03 NOTE — PROGRESS NOTES
Shasta Jarrett (:  1955) is a 61 y.o. female,Established patient, here for evaluation of the following chief complaint(s): Weight Loss and Shortness of Breath         ASSESSMENT/PLAN:  1. Unintended weight loss  -     CBC; Future  -     Comprehensive Metabolic Panel, Fasting; Future  -     Vitamin D 25 Hydroxy; Future  -     TSH with Reflex; Future  -     PTH, Intact; Future  -     C-Reactive Protein; Future  -     Vitamin B12 & Folate; Future  -     Zinc; Future  -     Urinalysis; Future  -     Occult Blood Screen; Future  -     CT ABDOMEN PELVIS WO CONTRAST Additional Contrast? Radiologist Recommendation; Future  -     HIV Screen; Future  2. Generalized weakness  -     CBC; Future  -     Comprehensive Metabolic Panel, Fasting; Future  -     Vitamin D 25 Hydroxy; Future  -     TSH with Reflex; Future  -     PTH, Intact; Future  -     C-Reactive Protein; Future  -     Vitamin B12 & Folate; Future  -     Zinc; Future  -     Urinalysis; Future  -     Occult Blood Screen; Future  -     CT ABDOMEN PELVIS WO CONTRAST Additional Contrast? Radiologist Recommendation; Future  -     HIV Screen; Future  3. Pulmonary emphysema, unspecified emphysema type (HCC)  -     albuterol sulfate  (90 Base) MCG/ACT inhaler; Inhale 2 puffs into the lungs every 6 hours as needed for Wheezing or Shortness of Breath, Disp-18 g, R-2Normal    Encouraged to count calories and record. Visit office for weight check to compare to previous. Return in 4 weeks (on 2022), or if symptoms worsen or fail to improve, for weight check. SUBJECTIVE/OBJECTIVE:  HPI    Shortness of breath and has lost weight, down to 120 lbs on home scale. Saw Neurology, went to [facility] to be tested for ALS, was negative. She goes back on  to see neuromuscular doctor. Still having leg spasms, which have gotten worse. Had EMG, informed she has lumbar radiculopathy and generalized weakness.     Had positive Cologuard test in 2019, went for colonoscopy afterward, while in [place], had polyp but no other concerns. Eating habits have changed, not eating as much due to \"feeling drained,\" eating mostly soup and sandwiches. She is drinking mostly water and tea. Review of Systems   Constitutional: Positive for fatigue and unexpected weight change. Negative for activity change and appetite change. Respiratory: Positive for shortness of breath. Negative for cough, chest tightness and wheezing. Cardiovascular: Negative for chest pain and palpitations. Gastrointestinal: Negative for abdominal pain. Genitourinary: Negative for dysuria. Musculoskeletal: Positive for myalgias. Negative for back pain. Neurological: Negative for dizziness and headaches. Hematological: Negative for adenopathy. Psychiatric/Behavioral: Negative for dysphoric mood and sleep disturbance. The patient is not nervous/anxious. No flowsheet data found.      Physical Exam    [INSTRUCTIONS:  \"[x]\" Indicates a positive item  \"[]\" Indicates a negative item  -- DELETE ALL ITEMS NOT EXAMINED]    Constitutional: [x] Appears well-developed and well-nourished [x] No apparent distress      [] Abnormal -     Mental status: [x] Alert and awake  [x] Oriented to person/place/time [x] Able to follow commands    [] Abnormal -     Eyes:   EOM    [x]  Normal    [] Abnormal -   Sclera  [x]  Normal    [] Abnormal -          Discharge [x]  None visible   [] Abnormal -     HENT: [x] Normocephalic, atraumatic  [] Abnormal -   [x] Mouth/Throat: Mucous membranes are moist    External Ears [x] Normal  [] Abnormal -    Neck: [x] No visualized mass [] Abnormal -     Pulmonary/Chest: [x] Respiratory effort normal   [x] No visualized signs of difficulty breathing or respiratory distress        [] Abnormal -      Musculoskeletal:   [x] Normal gait with no signs of ataxia         [x] Normal range of motion of neck        [] Abnormal -     Neurological:        [x] No Facial Asymmetry (Cranial nerve 7 motor function) (limited exam due to video visit)          [x] No gaze palsy        [] Abnormal -          Skin:        [x] No significant exanthematous lesions or discoloration noted on facial skin         [] Abnormal -            Psychiatric:       [x] Normal Affect [] Abnormal -        [x] No Hallucinations    Other pertinent observable physical exam findings:-          On this date 1/3/2022 I have spent 20 minutes reviewing previous notes, test results and face to face (virtual) with the patient discussing the diagnosis and importance of compliance with the treatment plan as well as documenting on the day of the visit. Ronen Craig, was evaluated through a synchronous (real-time) audio-video encounter. The patient (or guardian if applicable) is aware that this is a billable service. Verbal consent to proceed has been obtained within the past 12 months. The visit was conducted pursuant to the emergency declaration under the [address] [place] [address], [address] [place] [address] authority and the [employer] and [employer] General Act. Patient identification was verified, and a caregiver was present when appropriate. The patient was located in a state where the provider was credentialed to provide care. An electronic signature was used to authenticate this note.     --SOFIA Azar - CNP

## 2022-01-12 ENCOUNTER — HOSPITAL ENCOUNTER (OUTPATIENT)
Age: 67
Setting detail: SPECIMEN
Discharge: HOME | End: 2022-01-12

## 2022-01-12 ENCOUNTER — NURSE ONLY (OUTPATIENT)
Dept: FAMILY MEDICINE CLINIC | Age: 67
End: 2022-01-12

## 2022-01-12 VITALS — BODY MASS INDEX: 19.97 KG/M2 | WEIGHT: 120 LBS

## 2022-01-12 DIAGNOSIS — R63.4 WEIGHT LOSS, UNINTENTIONAL: Primary | ICD-10-CM

## 2022-01-12 DIAGNOSIS — R63.4 UNINTENDED WEIGHT LOSS: ICD-10-CM

## 2022-01-12 DIAGNOSIS — R53.1 GENERALIZED WEAKNESS: ICD-10-CM

## 2022-01-12 LAB
-: ABNORMAL
ALBUMIN SERPL-MCNC: 4.5 G/DL (ref 3.5–5.2)
ALBUMIN/GLOBULIN RATIO: 2.1 (ref 1–2.5)
ALP BLD-CCNC: 49 U/L (ref 35–104)
ALT SERPL-CCNC: 22 U/L (ref 5–33)
AMORPHOUS: ABNORMAL
ANION GAP SERPL CALCULATED.3IONS-SCNC: 13 MMOL/L (ref 9–17)
AST SERPL-CCNC: 36 U/L
BACTERIA: ABNORMAL
BILIRUB SERPL-MCNC: 0.42 MG/DL (ref 0.3–1.2)
BILIRUBIN URINE: NEGATIVE
BUN BLDV-MCNC: 12 MG/DL (ref 8–23)
BUN/CREAT BLD: ABNORMAL (ref 9–20)
C-REACTIVE PROTEIN: 3.6 MG/L (ref 0–5)
CALCIUM SERPL-MCNC: 9.1 MG/DL (ref 8.6–10.4)
CASTS UA: ABNORMAL /LPF (ref 0–2)
CASTS UA: ABNORMAL /LPF (ref 0–2)
CHLORIDE BLD-SCNC: 101 MMOL/L (ref 98–107)
CO2: 20 MMOL/L (ref 20–31)
COLOR: YELLOW
COMMENT UA: ABNORMAL
CREAT SERPL-MCNC: 0.83 MG/DL (ref 0.5–0.9)
CRYSTALS, UA: ABNORMAL /HPF
EPITHELIAL CELLS UA: ABNORMAL /HPF (ref 0–5)
FOLATE: >20 NG/ML
GFR AFRICAN AMERICAN: >60 ML/MIN
GFR NON-AFRICAN AMERICAN: >60 ML/MIN
GFR SERPL CREATININE-BSD FRML MDRD: ABNORMAL ML/MIN/{1.73_M2}
GFR SERPL CREATININE-BSD FRML MDRD: ABNORMAL ML/MIN/{1.73_M2}
GLUCOSE FASTING: 80 MG/DL (ref 70–99)
GLUCOSE URINE: NEGATIVE
HCT VFR BLD CALC: 39.2 % (ref 36.3–47.1)
HEMOGLOBIN: 12.9 G/DL (ref 11.9–15.1)
HIV AG/AB: NONREACTIVE
KETONES, URINE: ABNORMAL
LEUKOCYTE ESTERASE, URINE: ABNORMAL
MCH RBC QN AUTO: 29.1 PG (ref 25.2–33.5)
MCHC RBC AUTO-ENTMCNC: 32.9 G/DL (ref 28.4–34.8)
MCV RBC AUTO: 88.3 FL (ref 82.6–102.9)
MUCUS: ABNORMAL
NITRITE, URINE: NEGATIVE
NRBC AUTOMATED: 0 PER 100 WBC
OTHER OBSERVATIONS UA: ABNORMAL
PDW BLD-RTO: 12.1 % (ref 11.8–14.4)
PH UA: 5 (ref 5–8)
PLATELET # BLD: 220 K/UL (ref 138–453)
PMV BLD AUTO: 10.8 FL (ref 8.1–13.5)
POTASSIUM SERPL-SCNC: 4.8 MMOL/L (ref 3.7–5.3)
PROTEIN UA: NEGATIVE
PTH INTACT: 64.02 PG/ML (ref 15–65)
RBC # BLD: 4.44 M/UL (ref 3.95–5.11)
RBC UA: ABNORMAL /HPF (ref 0–2)
RENAL EPITHELIAL, UA: ABNORMAL /HPF
SODIUM BLD-SCNC: 134 MMOL/L (ref 135–144)
SPECIFIC GRAVITY UA: 1.02 (ref 1–1.03)
THYROXINE, FREE: 0.7 NG/DL (ref 0.93–1.7)
TOTAL PROTEIN: 6.6 G/DL (ref 6.4–8.3)
TRICHOMONAS: ABNORMAL
TSH SERPL DL<=0.05 MIU/L-ACNC: 0.09 MIU/L (ref 0.3–5)
TURBIDITY: ABNORMAL
URINE HGB: NEGATIVE
UROBILINOGEN, URINE: NORMAL
VITAMIN B-12: 946 PG/ML (ref 232–1245)
VITAMIN D 25-HYDROXY: 45.1 NG/ML (ref 30–100)
WBC # BLD: 5.2 K/UL (ref 3.5–11.3)
WBC UA: ABNORMAL /HPF (ref 0–5)
YEAST: ABNORMAL

## 2022-01-14 ENCOUNTER — TELEPHONE (OUTPATIENT)
Dept: NEUROLOGY | Age: 67
End: 2022-01-14

## 2022-01-14 NOTE — TELEPHONE ENCOUNTER
Manjinder Dial called the office this afternoon asking for a copy of the referral to [facility]. Patient states that she is receiving notice from her insurance that they aren't going to pay for the visit because they don't have a referral.  I explained to Manjinder Dial that after we send the referral to [address] [place] [address] they contact the patient's insurance to see if the patient can be seen or if something else needs to be done. Patient was directed to contact  to check in to this.

## 2022-01-16 LAB — ZINC: 69.7 UG/DL (ref 60–120)

## 2022-01-17 DIAGNOSIS — E03.8 SECONDARY HYPOTHYROIDISM: ICD-10-CM

## 2022-01-17 DIAGNOSIS — N39.0 URINARY TRACT INFECTION WITHOUT HEMATURIA, SITE UNSPECIFIED: Primary | ICD-10-CM

## 2022-01-17 RX ORDER — CEPHALEXIN 500 MG/1
500 CAPSULE ORAL 2 TIMES DAILY
Qty: 14 CAPSULE | Refills: 0 | Status: SHIPPED | OUTPATIENT
Start: 2022-01-17 | End: 2022-01-24

## 2022-01-20 ENCOUNTER — TELEPHONE (OUTPATIENT)
Dept: FAMILY MEDICINE CLINIC | Age: 67
End: 2022-01-20

## 2022-01-20 DIAGNOSIS — E03.8 SECONDARY HYPOTHYROIDISM: Primary | ICD-10-CM

## 2022-02-11 ENCOUNTER — PATIENT MESSAGE (OUTPATIENT)
Dept: FAMILY MEDICINE CLINIC | Age: 67
End: 2022-02-11

## 2022-02-11 ENCOUNTER — HOSPITAL ENCOUNTER (OUTPATIENT)
Facility: CLINIC | Age: 67
Discharge: HOME | End: 2022-02-11
Payer: COMMERCIAL

## 2022-02-11 DIAGNOSIS — M25.50 MULTIPLE JOINT PAIN: Primary | ICD-10-CM

## 2022-02-11 DIAGNOSIS — M53.9 MULTILEVEL DEGENERATIVE DISC DISEASE: ICD-10-CM

## 2022-02-11 PROCEDURE — 84439 ASSAY OF FREE THYROXINE: CPT

## 2022-02-11 PROCEDURE — 84481 FREE ASSAY (FT-3): CPT

## 2022-02-11 PROCEDURE — 84443 ASSAY THYROID STIM HORMONE: CPT

## 2022-02-11 PROCEDURE — 83520 IMMUNOASSAY QUANT NOS NONAB: CPT

## 2022-02-11 PROCEDURE — 36415 COLL VENOUS BLD VENIPUNCTURE: CPT

## 2022-02-12 LAB
T3 FREE: 3.17 PG/ML (ref 2.02–4.43)
THYROXINE, FREE: 0.8 NG/DL (ref 0.93–1.7)
TSH SERPL DL<=0.05 MIU/L-ACNC: 0.08 MIU/L (ref 0.3–5)

## 2022-02-14 RX ORDER — IBUPROFEN 800 MG/1
800 TABLET ORAL EVERY 8 HOURS PRN
Qty: 90 TABLET | Refills: 1 | Status: SHIPPED | OUTPATIENT
Start: 2022-02-14 | End: 2022-04-25

## 2022-02-14 RX ORDER — IBUPROFEN 800 MG/1
TABLET ORAL
COMMUNITY
Start: 2022-01-13 | End: 2022-02-14 | Stop reason: SDUPTHER

## 2022-02-14 NOTE — TELEPHONE ENCOUNTER
From: Michele Oglesby  To: Tammy Mac  Sent: 2/11/2022 9:09 PM EST  Subject: Rx refill    My doctor from [facility] gave me ibuprofen 800 but I'm almost out , can you give me a new refill.  Thanks

## 2022-02-17 LAB — TSH RECEPTOR AB: <0.9 IU/L

## 2022-02-21 ENCOUNTER — HOSPITAL ENCOUNTER (OUTPATIENT)
Dept: MRI IMAGING | Facility: CLINIC | Age: 67
Discharge: HOME | End: 2022-02-23
Payer: COMMERCIAL

## 2022-02-21 DIAGNOSIS — M48.062 SPINAL STENOSIS OF LUMBAR REGION WITH NEUROGENIC CLAUDICATION: ICD-10-CM

## 2022-02-21 PROCEDURE — 72148 MRI LUMBAR SPINE W/O DYE: CPT

## 2022-02-23 ENCOUNTER — TELEPHONE (OUTPATIENT)
Dept: FAMILY MEDICINE CLINIC | Age: 67
End: 2022-02-23

## 2022-02-23 NOTE — TELEPHONE ENCOUNTER
----- Message from Dimple Storm sent at 2/23/2022  2:40 PM EST -----  Subject: Message to Provider    QUESTIONS  Information for Provider? Patient needs an order for urinalysis so she can   come in and give a sample and she never got her omeprazole script and   needs sent to pharmacy. Please call  ---------------------------------------------------------------------------  --------------  CALL BACK INFO  What is the best way for the office to contact you? OK to leave message on   voicemail  Preferred Call Back Phone Number? [phone]  ---------------------------------------------------------------------------  --------------  SCRIPT ANSWERS  Relationship to Patient?  Self

## 2022-02-24 ENCOUNTER — NURSE ONLY (OUTPATIENT)
Dept: FAMILY MEDICINE CLINIC | Age: 67
End: 2022-02-24
Payer: COMMERCIAL

## 2022-02-24 ENCOUNTER — HOSPITAL ENCOUNTER (OUTPATIENT)
Age: 67
Setting detail: SPECIMEN
Discharge: HOME | End: 2022-02-24

## 2022-02-24 DIAGNOSIS — R30.0 DYSURIA: ICD-10-CM

## 2022-02-24 DIAGNOSIS — R30.0 DYSURIA: Primary | ICD-10-CM

## 2022-02-24 DIAGNOSIS — R63.4 UNINTENDED WEIGHT LOSS: ICD-10-CM

## 2022-02-24 DIAGNOSIS — R53.1 GENERALIZED WEAKNESS: ICD-10-CM

## 2022-02-24 LAB
BILIRUBIN, POC: NEGATIVE
BLOOD URINE, POC: ABNORMAL
CLARITY, POC: CLEAR
COLOR, POC: YELLOW
DATE, STOOL #1: NORMAL
GLUCOSE URINE, POC: NEGATIVE
HEMOCCULT SP1 STL QL: NEGATIVE
HEMOCCULT SP2 STL QL: NEGATIVE
HEMOCCULT SP3 STL QL: NEGATIVE
KETONES, POC: ABNORMAL
LEUKOCYTE EST, POC: ABNORMAL
NITRITE, POC: NEGATIVE
PH, POC: 5
PROTEIN, POC: NEGATIVE
SPECIFIC GRAVITY, POC: 1.03
TIME, STOOL #1: NORMAL
UROBILINOGEN, POC: 0.2

## 2022-02-24 PROCEDURE — 81002 URINALYSIS NONAUTO W/O SCOPE: CPT | Performed by: NURSE PRACTITIONER

## 2022-02-24 RX ORDER — OMEPRAZOLE 20 MG/1
20 CAPSULE, DELAYED RELEASE ORAL
Qty: 30 CAPSULE | Refills: 0 | Status: SHIPPED | OUTPATIENT
Start: 2022-02-24 | End: 2022-03-30

## 2022-02-24 RX ORDER — NITROFURANTOIN 25; 75 MG/1; MG/1
100 CAPSULE ORAL 2 TIMES DAILY
Qty: 20 CAPSULE | Refills: 0 | Status: SHIPPED | OUTPATIENT
Start: 2022-02-24 | End: 2022-03-06

## 2022-02-26 LAB
CULTURE: NORMAL
SPECIMEN DESCRIPTION: NORMAL

## 2022-03-09 ENCOUNTER — OFFICE VISIT (OUTPATIENT)
Dept: FAMILY MEDICINE CLINIC | Age: 67
End: 2022-03-09
Payer: COMMERCIAL

## 2022-03-09 VITALS
HEIGHT: 65 IN | BODY MASS INDEX: 20.09 KG/M2 | SYSTOLIC BLOOD PRESSURE: 138 MMHG | DIASTOLIC BLOOD PRESSURE: 80 MMHG | TEMPERATURE: 97.7 F | HEART RATE: 63 BPM | OXYGEN SATURATION: 100 % | WEIGHT: 120.6 LBS

## 2022-03-09 DIAGNOSIS — H57.12 DISCOMFORT OF LEFT EYE: ICD-10-CM

## 2022-03-09 DIAGNOSIS — M79.10 MUSCLE TENSION PAIN: ICD-10-CM

## 2022-03-09 DIAGNOSIS — Z23 NEED FOR PNEUMOCOCCAL VACCINATION: ICD-10-CM

## 2022-03-09 DIAGNOSIS — E05.90 SUBCLINICAL HYPERTHYROIDISM: Primary | ICD-10-CM

## 2022-03-09 DIAGNOSIS — Z23 NEED FOR SHINGLES VACCINE: ICD-10-CM

## 2022-03-09 PROBLEM — R79.89 ABNORMAL TSH: Status: ACTIVE | Noted: 2022-01-21

## 2022-03-09 PROCEDURE — 90732 PPSV23 VACC 2 YRS+ SUBQ/IM: CPT | Performed by: NURSE PRACTITIONER

## 2022-03-09 PROCEDURE — 90471 IMMUNIZATION ADMIN: CPT | Performed by: NURSE PRACTITIONER

## 2022-03-09 PROCEDURE — G8427 DOCREV CUR MEDS BY ELIG CLIN: HCPCS | Performed by: NURSE PRACTITIONER

## 2022-03-09 PROCEDURE — 99213 OFFICE O/P EST LOW 20 MIN: CPT | Performed by: NURSE PRACTITIONER

## 2022-03-09 PROCEDURE — 1036F TOBACCO NON-USER: CPT | Performed by: NURSE PRACTITIONER

## 2022-03-09 PROCEDURE — G8484 FLU IMMUNIZE NO ADMIN: HCPCS | Performed by: NURSE PRACTITIONER

## 2022-03-09 PROCEDURE — G8420 CALC BMI NORM PARAMETERS: HCPCS | Performed by: NURSE PRACTITIONER

## 2022-03-09 PROCEDURE — 3017F COLORECTAL CA SCREEN DOC REV: CPT | Performed by: NURSE PRACTITIONER

## 2022-03-09 RX ORDER — GABAPENTIN 300 MG/1
CAPSULE ORAL
COMMUNITY
Start: 2022-01-20 | End: 2022-05-04 | Stop reason: DRUGHIGH

## 2022-03-09 RX ORDER — ZOSTER VACCINE RECOMBINANT, ADJUVANTED 50 MCG/0.5
0.5 KIT INTRAMUSCULAR SEE ADMIN INSTRUCTIONS
Qty: 0.5 ML | Refills: 1 | Status: SHIPPED | OUTPATIENT
Start: 2022-03-09 | End: 2022-05-04

## 2022-03-09 ASSESSMENT — ENCOUNTER SYMPTOMS
CHEST TIGHTNESS: 0
SHORTNESS OF BREATH: 0
ABDOMINAL PAIN: 0
COUGH: 0
BACK PAIN: 0

## 2022-03-09 NOTE — PROGRESS NOTES
Benito Pike is a 61 y.o. female who presents in office today with Self   follow up on chronic conditions including:   Patient Active Problem List   Diagnosis    Allergic rhinitis    Osteoporosis without current pathological fracture    Impaired fasting glucose    Elevated LFTs    Burning sensation of feet    Personal history of tobacco use    Muscle spasms of both lower extremities    Night muscle spasms    Subclinical hyperthyroidism    Abnormal TSH       Chief Complaint   Patient presents with    Discuss Labs        History of Present Illness:     HPI    Reports she has started feeling better since February. Wanting to discuss thyroid results. She received message from endocrinologist, but was unclear about meaning of response. Has stopped taking biotin as she was told this could affect her thyroid. She now suspects poor sleep was responsible for weight loss. She is now sleeping better and eating better. Neurologist Dr Constance Osullivan at [facility] put her on gabapentin 300 mg and leg spasms have mostly stopped. Her ROM has improved in her right leg but still more limited in her arms. She is no longer in physical therapy. Dr Constance Osullivan tested muscles in arms and legs but was not informed of problem. She is doing arm exercises daily, about 5-7 minutes daily. Ibuprofen has not been helpful with this but is helpful for mid back pain. She has used heating pad with some improvement. A few weeks ago had vision loss in right eye for a couple seconds. Has not occurred since then. Sometimes has pain behind eye, every now and then. She has not had eye exam since about 2020.        Care gaps: COVID-19 vaccine:  Pfizer x2, April and May 2021  Colon CA screening: +cologuard 2019, reports follow up colonoscopy with polyp  Vaccines:   Shingles (order provided)  Hepatitis C/HIV screens:   Breast CA screening:   Due - has order  OB/GYN, cervical CA screening:  S/p hyst    Health Maintenance Due   Topic Date Due    DTaP/Tdap/Td vaccine (1 - Tdap) Never done    Breast cancer screen  Never done    Shingles Vaccine (1 of 2) Never done    Flu vaccine (1) Never done    COVID-19 Vaccine (3 - Booster for Pfizer series) 10/19/2021        Patient Care Team:  SOFIA Caba CNP as PCP - General (Nurse Practitioner)  SOFIA Caba CNP as PCP - [facility] Empaneled Provider    Reviewed     [x] Past Medical, Family, and Social History was reviewed per writer and does contribute to the patient presenting condition. [x] Laboratory Results, Vital signs, Imaging, Active Problems, Immunizations, Current/Recently Discontinued Medications, Health Maintenance Activities Due, Referral Notes (if available) were reviewed per writer     [x] Reviewed Depression screening if taken or valid today or any other valid screening tool (others seen below) Interpretation of Total Score DepressionSeverity: 1-4 = Minimal depression, 5-9 = Mild depression, 10-14 = Moderate depression, 15-19 = Moderately severe depression, 20-27 =Severe depression    PHQ Scores 5/10/2021 4/22/2021   PHQ2 Score 0 0   PHQ9 Score 0 0     Interpretation of Total Score Depression Severity: 1-4 = Minimal depression, 5-9 = Mild depression, 10-14 = Moderate depression, 15-19 = Moderately severe depression, 20-27 = Severe depression     Review of Systems (Subjective)     Review of Systems   Constitutional: Negative for activity change, appetite change, fatigue and unexpected weight change. Respiratory: Negative for cough, chest tightness and shortness of breath. Cardiovascular: Negative for chest pain and palpitations. Gastrointestinal: Negative for abdominal pain. Genitourinary: Negative for dysuria. Musculoskeletal: Positive for myalgias (bilateral upper arms). Negative for back pain. Neurological: Negative for dizziness and headaches. Hematological: Negative for adenopathy. Psychiatric/Behavioral: Negative for dysphoric mood and sleep disturbance. The patient is not nervous/anxious. See HPI     Physical Assessment (Objective)     /80   Pulse 63   Temp 97.7 °F (36.5 °C)   Ht 5' 5\" (1.651 m)   Wt 120 lb 9.6 oz (54.7 kg)   SpO2 100%   BMI 20.07 kg/m²      Physical Exam  Vitals reviewed. Constitutional:       Appearance: She is normal weight. HENT:      Head: Normocephalic and atraumatic. Eyes:      Extraocular Movements: Extraocular movements intact. Conjunctiva/sclera: Conjunctivae normal.   Cardiovascular:      Rate and Rhythm: Normal rate and regular rhythm. Pulses: Normal pulses. Heart sounds: Normal heart sounds. No murmur heard. Pulmonary:      Effort: Pulmonary effort is normal. No respiratory distress. Breath sounds: Normal breath sounds. No wheezing. Abdominal:      General: Bowel sounds are normal.      Palpations: Abdomen is soft. Musculoskeletal:         General: No swelling. Cervical back: Normal range of motion. Skin:     General: Skin is warm and dry. Capillary Refill: Capillary refill takes less than 2 seconds. Neurological:      Mental Status: She is alert and oriented to person, place, and time. Psychiatric:         Mood and Affect: Mood normal.         Behavior: Behavior normal.         Thought Content: Thought content normal.         Judgment: Judgment normal.         Diagnoses / Plan:   1. Subclinical hyperthyroidism  2. Muscle tension pain  3. Need for shingles vaccine  -     zoster recombinant adjuvanted vaccine [facility]) 50 MCG/0.5ML SUSR injection; Inject 0.5 mLs into the muscle See Admin Instructions 1 dose now and repeat in 2-6 months, Disp-0.5 mL, R-1Print  4. Need for pneumococcal vaccination  -     PNEUMOVAX 23 subcutaneous/IM (Pneumococcal polysaccharide vaccine 23-valent >= 3yo)  5. Discomfort of left eye     Encouraged to schedule eye exam.   Follow up with specialists as recommended. Encouraged healthy diet and exercise.    Call office with any new or worsening symptoms or concerns. Return in 3 months (on 6/9/2022), or if symptoms worsen or fail to improve, for chronic conditions, weight check. Electronically signed by SOFIA De La Fuente CNP on 3/9/2022 at 8:59 PM    Note is dictated utilizing voice recognition software. Unfortunately this leads to occasional typographical errors. Please contact our office if you have any questions.

## 2022-03-30 RX ORDER — OMEPRAZOLE 20 MG/1
CAPSULE, DELAYED RELEASE ORAL
Qty: 30 CAPSULE | Refills: 3 | Status: SHIPPED | OUTPATIENT
Start: 2022-03-30 | End: 2022-04-07

## 2022-04-05 ENCOUNTER — PATIENT MESSAGE (OUTPATIENT)
Dept: FAMILY MEDICINE CLINIC | Age: 67
End: 2022-04-05

## 2022-04-05 DIAGNOSIS — J30.2 SEASONAL ALLERGIES: Primary | ICD-10-CM

## 2022-04-05 RX ORDER — LORATADINE 10 MG/1
10 TABLET ORAL DAILY
Qty: 30 TABLET | Refills: 3 | Status: SHIPPED | OUTPATIENT
Start: 2022-04-05 | End: 2022-05-02

## 2022-04-05 NOTE — TELEPHONE ENCOUNTER
From: Phuong Oliveros  To: Colleen Stringer  Sent: 4/5/2022 10:50 AM EDT  Subject: Allergies and depression    Could you give me a rx for seasonal allergies? I'm having a lot of sneezing, runny nose, watery eyes and fatigue. I also feel like I need to go back on paxil for depression. I've been feeling very sad and cry at the smallest thing. Thanks!

## 2022-04-06 ASSESSMENT — ENCOUNTER SYMPTOMS
CHEST TIGHTNESS: 0
SHORTNESS OF BREATH: 0

## 2022-04-07 ENCOUNTER — TELEMEDICINE (OUTPATIENT)
Dept: FAMILY MEDICINE CLINIC | Age: 67
End: 2022-04-07
Payer: COMMERCIAL

## 2022-04-07 DIAGNOSIS — F33.1 MODERATE EPISODE OF RECURRENT MAJOR DEPRESSIVE DISORDER (HCC): Primary | ICD-10-CM

## 2022-04-07 PROCEDURE — 3017F COLORECTAL CA SCREEN DOC REV: CPT | Performed by: NURSE PRACTITIONER

## 2022-04-07 PROCEDURE — 99213 OFFICE O/P EST LOW 20 MIN: CPT | Performed by: NURSE PRACTITIONER

## 2022-04-07 PROCEDURE — G8427 DOCREV CUR MEDS BY ELIG CLIN: HCPCS | Performed by: NURSE PRACTITIONER

## 2022-04-07 RX ORDER — PAROXETINE 10 MG/1
10 TABLET, FILM COATED ORAL DAILY
Qty: 30 TABLET | Refills: 0 | Status: SHIPPED | OUTPATIENT
Start: 2022-04-07 | End: 2022-05-02

## 2022-04-07 ASSESSMENT — PATIENT HEALTH QUESTIONNAIRE - PHQ9
SUM OF ALL RESPONSES TO PHQ QUESTIONS 1-9: 17
1. LITTLE INTEREST OR PLEASURE IN DOING THINGS: 3
SUM OF ALL RESPONSES TO PHQ QUESTIONS 1-9: 17
4. FEELING TIRED OR HAVING LITTLE ENERGY: 3
SUM OF ALL RESPONSES TO PHQ QUESTIONS 1-9: 17
7. TROUBLE CONCENTRATING ON THINGS, SUCH AS READING THE NEWSPAPER OR WATCHING TELEVISION: 3
9. THOUGHTS THAT YOU WOULD BE BETTER OFF DEAD, OR OF HURTING YOURSELF: 0
6. FEELING BAD ABOUT YOURSELF - OR THAT YOU ARE A FAILURE OR HAVE LET YOURSELF OR YOUR FAMILY DOWN: 0
SUM OF ALL RESPONSES TO PHQ9 QUESTIONS 1 & 2: 6
2. FEELING DOWN, DEPRESSED OR HOPELESS: 3
8. MOVING OR SPEAKING SO SLOWLY THAT OTHER PEOPLE COULD HAVE NOTICED. OR THE OPPOSITE, BEING SO FIGETY OR RESTLESS THAT YOU HAVE BEEN MOVING AROUND A LOT MORE THAN USUAL: 0
5. POOR APPETITE OR OVEREATING: 2
10. IF YOU CHECKED OFF ANY PROBLEMS, HOW DIFFICULT HAVE THESE PROBLEMS MADE IT FOR YOU TO DO YOUR WORK, TAKE CARE OF THINGS AT HOME, OR GET ALONG WITH OTHER PEOPLE: 1
SUM OF ALL RESPONSES TO PHQ QUESTIONS 1-9: 17
3. TROUBLE FALLING OR STAYING ASLEEP: 3

## 2022-04-07 NOTE — PROGRESS NOTES
Channing Russell (:  1955) is a Established patient, here for evaluation of the following:    Assessment & Plan   Below is the assessment and plan developed based on review of pertinent history, physical exam, labs, studies, and medications. 1. Moderate episode of recurrent major depressive disorder (HCC)  -     PARoxetine (PAXIL) 10 MG tablet; Take 1 tablet by mouth daily, Disp-30 tablet, R-0Normal    Discussed copay card for Prolia. Will contact [facility] to determine if able to get Prolia at reduced rate. Return in about 4 weeks (around 2022) for anxiety/dep follow up, Med Check. Subjective   HPI     Here to discuss restarting antidepressant. Had been taking Paxil in the past with improvement. At this time feeling sad and crying a lot randomly. She stays home a lot and doesn't feel like going anywhere. Has desire to spend time with her neighbor, but doesn't have the motivation to actually do it. Due for Prolia injection. Has not ordered it yet as she was charged large copay for last injection. She did call  and was told [employer] was only supposed to charge $25 but bill is $133. She did call for copay assistance but did not get through. Review of Systems   Constitutional: Positive for appetite change (poor appetite). Negative for activity change, fatigue and fever. Respiratory: Negative for chest tightness and shortness of breath. Cardiovascular: Negative for chest pain and palpitations. Psychiatric/Behavioral: Positive for dysphoric mood and sleep disturbance. Negative for self-injury and suicidal ideas. The patient is nervous/anxious.           PHQ-9  2022   Little interest or pleasure in doing things 3   Feeling down, depressed, or hopeless 3   Trouble falling or staying asleep, or sleeping too much 3   Feeling tired or having little energy 3   Poor appetite or overeating 2   Feeling bad about yourself - or that you are a failure or have let yourself or your [x] No Hallucinations    Other pertinent observable physical exam findings:-             On this date 4/7/2022 I have spent 15 minutes reviewing previous notes, test results and face to face (virtual) with the patient discussing the diagnosis and importance of compliance with the treatment plan as well as documenting on the day of the visit. Satish Shultz, was evaluated through a synchronous (real-time) audio-video encounter. The patient (or guardian if applicable) is aware that this is a billable service, which includes applicable co-pays. This Virtual Visit was conducted with patient's (and/or legal guardian's) consent. The visit was conducted pursuant to the emergency declaration under the [address] [place] [address] authority and the [employer] General Act. Patient identification was verified, and a caregiver was present when appropriate. The patient was located at home in a state where the provider was licensed to provide care.        --SOFIA Small - CNP

## 2022-04-12 ENCOUNTER — PATIENT MESSAGE (OUTPATIENT)
Dept: FAMILY MEDICINE CLINIC | Age: 67
End: 2022-04-12

## 2022-04-12 DIAGNOSIS — M79.602 BILATERAL ARM PAIN: Primary | ICD-10-CM

## 2022-04-12 DIAGNOSIS — M79.601 BILATERAL ARM PAIN: Primary | ICD-10-CM

## 2022-04-12 DIAGNOSIS — M25.619 IMPAIRED RANGE OF MOTION OF SHOULDER, UNSPECIFIED LATERALITY: ICD-10-CM

## 2022-04-12 NOTE — TELEPHONE ENCOUNTER
From: Lia Ball  To: Era Wright  Sent: 4/12/2022 9:15 AM EDT  Subject: Lost range of motion    I would like to try some physical therapy for the range of motion loss and pain in my arms and hands. I still have a lot of pain and stiffness between my shoulder blades. Do you think PT would help?

## 2022-04-19 ENCOUNTER — PATIENT MESSAGE (OUTPATIENT)
Dept: FAMILY MEDICINE CLINIC | Age: 67
End: 2022-04-19

## 2022-04-19 NOTE — TELEPHONE ENCOUNTER
From: Monroe Sweet  To: Josiah Costa  Sent: 4/19/2022 10:11 AM EDT  Subject: Have you found out anything about the Prolia injection? I would like to take something else for my osteoporosis if I can't get the Prolia injection. Please let me know.

## 2022-04-21 ENCOUNTER — HOSPITAL ENCOUNTER (OUTPATIENT)
Dept: PHYSICAL THERAPY | Facility: CLINIC | Age: 67
Setting detail: THERAPIES SERIES
Discharge: HOME | End: 2022-04-21
Payer: COMMERCIAL

## 2022-04-21 PROCEDURE — 97161 PT EVAL LOW COMPLEX 20 MIN: CPT

## 2022-04-21 PROCEDURE — 97110 THERAPEUTIC EXERCISES: CPT

## 2022-04-21 NOTE — CONSULTS
[x] [facility]  Outpatient Rehabilitation &  Therapy  [facility]   [place]: [phone]  F: [phone]        Physical Therapy Upper Extremity Evaluation    Date:  2022  Patient: Mich Eng  : 1955  MRN: [id number]  Physician: Dr. Nickie White:  Medical Diagnosis: Bilat UE pain, shoulder pain    Rehab Codes: M79.601, M79.602, M25.619, M62.81 (Muscle Weakness), M62.9 (Disorder of Muscle), M79.1 (Myalgia)  Onset Date:   Next 's appt. : -    Subjective:   CC/HPI:  Pt is a 61 yr old female with Bilat UE limited ROM and pain in the shoulders. She attended PT in the fall of  for aquatics, showed improvement and then went to [facility]. She had depression that kept her in bed for 3 months and limited mobility in general. During that time she lost ROM and strength.        PMHx:    Allergic rhinitis    Osteoporosis without current pathological fracture   Impaired fasting glucose   Elevated LFTs   Burning sensation of feet   Personal history of tobacco use   Muscle spasms of both lower extremities   Night muscle spasms   Subclinical hyperthyroidism   Abnormal TSH         [] Unremarkable             [x] Refer to full medical chart  In EPIC     Tests: [] X-Ray:   [] MRI:   [] Other:      ADL/IADL [x] Previously independent with all [] Currently independent with all Who currently assists the patient with task     [] Previously independent with all except: [x] Currently independent with all except:     Bathing  [] Assist [] Assist     Dress/grooming [] Assist [x] Assist     Transfer/mobility [] Assist [x] Assist     Feeding [] Assist [] Assist     Toileting [] Assist [] Assist     Driving [] Assist [] Assist     Housekeeping [] Assist [x] Assist     Grocery shop/meal prep [] Assist [x] Assist         Gait Prior level of function Current level of function    [x] Independent  [] Assist [x] Independent  [] Assist   Device: [] Independent [] Independent    [] Straight Cane [] Quad cane [] Straight Cane [] Quad cane    [] Standard walker [] Rolling walker   [] 4 wheeled walker [] Standard walker [] Rolling walker   [] 4 wheeled walker    [] Wheelchair [] Wheelchair       Medications: [x] Refer to full medical record [] None [] Other:  Allergies:      [] Refer to full medical record [] None [] Other:    Function:  Hand Dominance  [x] Right  [] Left  Marital Status Lives alone    Employment Retired    Job status -   Work Activities/duties  -       Pain present?  yes   Location Bilat shoulders down to hands  Midback pain bilat   Pain Rating currently 3/10   Pain at worse 8/10   Pain at best 8/10   Description of pain ache   Altered Sensation intact   What makes it worse    What makes it better Ibuprofen           Objective:      STRENGTH    Left Right   C5 Shld Abd 3- 3-   Shld Flexion 3- 3-   Shld IR 3+ 3+   Shld ER 3+ 3+   C6 Elb Flex 4 4   C7 Elb Ext 4 4                  AROM PROM    Left Right Left Right   Shld Abd 90 72 110 88   Shld Flex 70 66 95 80   Shld IR 10 12     Shld ER 22 35     Shld HAB                          ROM   Cervical    Flexion maria 25%   Extension maria 50%   Rotation L maria 25% R maria 25%   Sidebend L maria 50% R maria 50%   Retraction              OBSERVATION No Deficit Deficit Not Tested Comments   Posture       Forward Head [] [x] []    Rounded Shoulders [] [x] []    Kyphosis [] [x] []    Slumped Sitting [] [x] []    Palpation [] [x] [] RUE shoulder          Flexibility Normal LUE Deficit RUE Deficit   UTrap [] [x] [x]   L.Scap [] [x] [x]   Scalenes  [] [x] [x]   Pec Major [] [x] [x]   Pec Minor [] [x] [x]             FUNCTION Normal Difficult Unable   Sitting [x] [] []   Standing [x] [] []   Overhead reach  [] [x] []   Groom/Dress [] [x] []   Lift/Carry [] [x] []       Functional Test: QuickDASH Score: 43% functionally impaired       Assessment:    Patient would benefit from skilled physical therapy services in order to improve ROM, strength and mobility in her bilat UE's to ease function with ADLs. Problems:    [x] ? Pain  [x] ? ROM  [x] ? Strength  [x] ? Function        Goals  MET NOT MET ON-  GOING  Details   Date Addressed:        STG: To be met in 10 treatments           1. ? Pain: Decrease pain levels to 4/10 with ADLs []  []  []      2. ? ROM: Increase flexibility and AROM limitations throughout to equal bilat to reduce difficulty with ADLs []  []  []      3. ? Strength: Increase MMT to 5/5 throughout to ease functional limitations and mobility  []  []  []     4. Independent with Home Exercise Programs []  []  []      []  []  []     Date Addressed:        LTG: To be met in 20 treatments       1. Improve score on assessment tool Quick DASH  from 43% impairment to less than 20% impairment  []  []  []     2. Reduce pain levels to 2/10 or less with ADLs []  []  []     3. Patient to perform ADLs with overhead motion at home without difficult []  []  []                  Patient goals: Improve ROM     Rehab Potential:  [x] Good  [] Fair  [] Poor   Suggested Professional Referral:  [x] No  [] Yes:  Barriers to Goal Achievement[de-identified]  [x] No  [] Yes:  Domestic Concerns:  [x] No  [] Yes:    Pt. Education:  [x] Plans/Goals, Risks/Benefits discussed  [x] Home exercise program  Method of Education: [x] Verbal  [x] Demo  [x] Written  Comprehension of Education:  [x] Verbalizes understanding. [x] Demonstrates understanding. [x] Needs Review. [] Demonstrates/verbalizes understanding of HEP/Ed previously given. Access Code: [email or web address]  URL: [email or web address]  Date: 04/21/2022  Prepared by: Jing Awan    Exercises  Seated Shoulder Flexion Towel Slide at Table Top - 1 x daily - 7 x weekly - 3 sets - 10 reps  Seated Shoulder Scaption Slide at Table Top with Forearm in Neutral - 1 x daily - 7 x weekly - 3 sets - 10 reps  Supine Shoulder Flexion Extension AAROM with Dowel - 1 x daily - 7 x weekly - 10 reps - 3 sets  Seated Scapular Retraction - 1 x daily - 7 x weekly - 10 reps - 3 sets  Standing Upper Trapezius Stretch - 1 x daily - 7 x weekly - 3 sets - 30 (sec) hold      Treatment Plan:  [x] Therapeutic Exercise    [] Modalities:  [x] Therapeutic Activity     [] Ultrasound  [] Electrical Stimulation  [] Gait Training     [] Lumbar/Cervical Traction  [x] Neuromuscular Re-education [] Cold/hotpack [] Iontophoresis: 4 mg/mL  [x] Instruction in HEP                Dexamethasone Sodium Phosphate 40-80 mAmin  [x] Manual Therapy              [x]  Aquatic Therapy       [x] Vasocompression: Darya Lutz  [] Other:    []  Medication allergies reviewed for use of    Dexamethasone Sodium Phosphate 4mg/ml     with iontophoresis treatments. Pt is not allergic.     Frequency:  2 x/week for 20 visits      Todays Treatment:       Exercise    Bilat Shoulder Pain  All exercises bilat Reps/ Time Weight/ Level Comments         Pulleys             Levator Scap Stretch       Upper Trap Stretch    HEP  Hold her own wrist, No overhead    Scapular Retractions    HEP   Low corner stretch             Supine       Wand flexion AAROM       Wand ER AAROM       Wand Abd AAROM             Table Slides      Flexion    HEP   Abd       ER            Wall walks       Counter walkout              Manual: Bilat PROM of shoulders all planes    Next visit: Advance HEP, add GH and joint glides to improve mobility     Evaluation Complexity:  History (Personal factors, comorbidities) [x] 0 [] 1-2 [] 3+   Exam (limitations, restrictions) [x] 1-2 [] 3 [] 4+   Clinical presentation (progression) [x] Stable [] Evolving  [] Unstable   Decision Making [x] Low [] Moderate [] High    [x] Low Complexity [] Moderate Complexity [] High Complexity       Treatment Charges: Mins Units   [x] Evaluation       [x]  Low       []  Moderate       []  High 25 1   []  Modalities     [x]  Ther Exercise 10 1   []  Manual Therapy     []  Ther Activities     []  Aquatics     []  Vasocompression     []  Other       TOTAL TREATMENT TIME: 40    Time in: 1500      Time out: 1550    Electronically signed by: Fredy Giles PT        Physician Signature:________________________________Date:__________________  By signing above or cosigning this note, I have reviewed this plan of care and certify a need for medically necessary rehabilitation services.      *PLEASE SIGN ABOVE AND FAX BACK ALL PAGES*

## 2022-04-24 DIAGNOSIS — M53.9 MULTILEVEL DEGENERATIVE DISC DISEASE: ICD-10-CM

## 2022-04-24 DIAGNOSIS — M25.50 MULTIPLE JOINT PAIN: ICD-10-CM

## 2022-04-25 RX ORDER — IBUPROFEN 800 MG/1
800 TABLET ORAL EVERY 8 HOURS PRN
Qty: 90 TABLET | Refills: 1 | Status: SHIPPED | OUTPATIENT
Start: 2022-04-25 | End: 2022-09-21 | Stop reason: ALTCHOICE

## 2022-04-25 NOTE — TELEPHONE ENCOUNTER
Prolia approved. Patient states [facility] contacted her this AM and stated they will be shipping it to our office. We will call patient to come in for nurse visit once we get it.

## 2022-04-26 ENCOUNTER — TELEPHONE (OUTPATIENT)
Dept: FAMILY MEDICINE CLINIC | Age: 67
End: 2022-04-26

## 2022-04-26 NOTE — TELEPHONE ENCOUNTER
Prolia injection will be delivered 5-3-22 per [facility]

## 2022-04-27 ENCOUNTER — HOSPITAL ENCOUNTER (OUTPATIENT)
Dept: PHYSICAL THERAPY | Facility: CLINIC | Age: 67
Setting detail: THERAPIES SERIES
Discharge: HOME | End: 2022-04-27
Payer: COMMERCIAL

## 2022-04-27 PROCEDURE — 97110 THERAPEUTIC EXERCISES: CPT

## 2022-04-27 NOTE — FLOWSHEET NOTE
[x] [facility]  Outpatient Rehabilitation &  Therapy  [facility]   [address]: [phone]  F: [phone]      Physical Therapy Daily Treatment Note    Date:  2022  Patient Name:  Naomy Gomez    :  1955  MRN: [id number]  Physician: Dr. Yaneli Cotto: Bilateral Upper Extremities   [employer] Vs.  Remain  Auth Req'd after 12 Vs. for [id number]  [id number] auth after 20 Vs. No auth for [id number]  $10 Copay    Medical Diagnosis: Bilat UE pain, shoulder pain                   Rehab Codes: M79.601, M79.602, M25.619, M62.81 (Muscle Weakness), M62.9 (Disorder of Muscle), M79.1 (Myalgia)  Onset Date:                Next 's appt. : -  Visit# / total visits:      Cancels/No Shows: 0/0    Subjective:    Pain:  [x] Yes  [] No Location: Bilateral scapula, B upper trap  Pain Rating: (0-10 scale) 2-3/10  Pain altered Tx:  [x] No  [] Yes  Action:  Comments: Pt arrives stating she has pain in the base of her neck, upper trap and between her shoulder blades. Pt reports compliant with HEP and it seemed to make her back and arms feel better. Objective:  Modalities:   Precautions:   Todays Treatment:                                        Exercise     Bilat Shoulder Pain  All exercises bilat Reps/ Time Weight/ Level Comments             Pulleys   3'/3'  flex/abd               Levator Scap Stretch  3x30\"  bilat HEP   Upper Trap Stretch  3x30\"   bilat HEP   Scapular Retractions  x20   HEP   Corner single arm pect stretch 10x10\"  bilat HEP             Supine          Wand flexion AAROM   Next        Wand ER AAROM   Next        Wand Abd AAROM   Next                  Table Slides         Flexion   x10      Scaption   x10                 Wall walks  x10 Flex/abd HEP         TBand bicep curls 2x10 orange HEP   TBand elbow ext 2x10 orange HEP   TBand rows Next      TBand extension  Next      TBand ER Next      TBand IR Next                            Specific instructions for next treatment: PROM, GH and joint glides to improve mobility        Treatment Charges: Mins Units   []  Modalities     [x]  Ther Exercise 35 2   []  Manual Therapy     []  Ther Activities     []  Aquatics     []  Vasocompression     []  Other     Total Treatment time 35 2       Assessment: [x] Progressing toward goals. Pt with increased pain at end range during pulleys and wall slides. Added scapular strengthening and shoulder ROM exercises to HEP this date. Pt requested exercises for bicep and triceps due to significant loss of weight resulting in an excess amounts of skin, therefore, administered resistance band to begin elbow flexion and extension. Educated pt on DOMS and to resume all exercises tomorrow. Will continue to progress UE ROM and strength. [] No change. [] Other:  [x] Patient would continue to benefit from skilled physical therapy services in order to: ROM, strength and mobility in her bilat UE's to ease function with ADLs    STG/LTG     Goals  MET NOT MET ON-  GOING  Details   Date Addressed:            STG: To be met in 10 treatments  ?          1. ? Pain: Decrease pain levels to 4/10 with ADLs []? ?  []??  []??      2. ? ROM: Increase flexibility and AROM limitations throughout to equal bilat to reduce difficulty with ADLs []? ?  []??  []??      3. ? Strength: Increase MMT to 5/5 throughout to ease functional limitations and mobility  []? ?  []??  []??      4. Independent with Home Exercise Programs []? ?  []??  []??        []? ?  []??  []??      Date Addressed:            LTG: To be met in 20 treatments           1. Improve score on assessment tool Quick DASH  from 43% impairment to less than 20% impairment  []??  []??  []??      2. Reduce pain levels to 2/10 or less with ADLs []? ?  []??  []??      3. Patient to perform ADLs with overhead motion at home without difficult []? ?  []??  []??                           Patient goals: Improve ROM      Rehab

## 2022-04-29 ENCOUNTER — HOSPITAL ENCOUNTER (OUTPATIENT)
Dept: PHYSICAL THERAPY | Facility: CLINIC | Age: 67
Setting detail: THERAPIES SERIES
Discharge: HOME | End: 2022-04-29
Payer: COMMERCIAL

## 2022-04-29 PROCEDURE — 97110 THERAPEUTIC EXERCISES: CPT

## 2022-04-29 NOTE — FLOWSHEET NOTE
[x] [facility]  Outpatient Rehabilitation &  Therapy  [facility]   [place]: [phone]  F: [phone]      Physical Therapy Daily Treatment Note    Date:  2022  Patient Name:  Lia Ball    :  1955  MRN: [id number]  Physician: Dr. Giulia Leigh: Bilateral Upper Extremities   [employer] Vs.  Remain  Auth Req'd after 12 Vs. for [id number]  [id number] auth after 20 Vs. No auth for [id number]  $10 Copay    Medical Diagnosis: Bilat UE pain, shoulder pain                   Rehab Codes: M79.601, M79.602, M25.619, M62.81 (Muscle Weakness), M62.9 (Disorder of Muscle), M79.1 (Myalgia)  Onset Date:                Next 's appt. : -  Visit# / total visits: 3/20     Cancels/No Shows: 0/0    Subjective:    Pain:  [x] Yes  [] No Location: bilat UE, lower back     Pain Rating: (0-10 scale) 3/10  Pain altered Tx:  [x] No  [] Yes  Action:  Comments: Pt arrives with some pain in her arms and low back. Reports that she got a new job at [employer] today and will only be able to come 1x/week.      Objective:  Modalities:   Precautions:                               Exercise     Bilat Shoulder Pain  All exercises bilat Reps/ Time Weight/ Level Comments             Pulleys   3'/3'  flex/abd               Levator Scap Stretch  3x30\"  bilat HEP   Upper Trap Stretch  3x30\"   bilat HEP   Scapular Retractions  x10   HEP   Corner single arm pect stretch 3x20\"  bilat HEP             Supine          Wand flexion AAROM  x10 3# bar     Wand ER AAROM  10x10\" 3# bar            sidelying       shld abd  x10  Added     shld ER  x10   Added               Wall Slides - flexion    bilat arms x10  HEP  Using towel this date    Wall slides - abd   Single arm  x10   Using towel          TBand bicep curls 2x10 orange HEP   TBand elbow ext x10 orange HEP   TBand rows 2x10  Orange  Added     TBand extension  2x10  Orange  Added     TBand bilat ER 2x10   Orange  Added 4/29                                  Specific instructions for next treatment: PROM, GH and joint glides to improve mobility, progress band resistance        Treatment Charges: Mins Units   []  Modalities     [x]  Ther Exercise 40 3   []  Manual Therapy     []  Ther Activities     []  Aquatics     []  Vasocompression     []  Other     Total Treatment time 40 2       Assessment: [x] Progressing toward goals. Added multiple exercises to improve UE strength. She continues to complain of pain at end ranges of shoulder motion, encouraged to stay within her pain free range as able. Patient slow to transition during bed mobility. She reports decreased stiffness at end of session. [] No change. [] Other:  [x] Patient would continue to benefit from skilled physical therapy services in order to: ROM, strength and mobility in her bilat UE's to ease function with ADLs    STG/LTG     Goals  MET NOT MET ON-  GOING  Details   Date Addressed:            STG: To be met in 10 treatments  ?          1. ? Pain: Decrease pain levels to 4/10 with ADLs []? ?  []??  []??      2. ? ROM: Increase flexibility and AROM limitations throughout to equal bilat to reduce difficulty with ADLs []? ?  []??  []??      3. ? Strength: Increase MMT to 5/5 throughout to ease functional limitations and mobility  []? ?  []??  []??      4. Independent with Home Exercise Programs []? ?  []??  []??        []? ?  []??  []??      Date Addressed:            LTG: To be met in 20 treatments           1. Improve score on assessment tool Quick DASH  from 43% impairment to less than 20% impairment  []??  []??  []??      2. Reduce pain levels to 2/10 or less with ADLs []? ?  []??  []??      3. Patient to perform ADLs with overhead motion at home without difficult []? ?  []??  []??                           Patient goals: Improve ROM      Rehab Potential:  [x]? Good  []? Fair  []? Poor    Suggested Professional Referral:  [x]? No  []? Yes:  Barriers to Goal Achievement[de-identified]  [x]? No  []? Yes:  Domestic Concerns:  [x]? No  []? Yes:      Pt. Education:  [x] Yes  [] No  [x] Reviewed Prior HEP/Ed  Method of Education: [x] Verbal  [x] Demo  [x] Written  Added remaining Tband exercises. Access Code: [email or web address]  URL: [employer]/  Date: 04/29/2022  Prepared by: Roma Cat    Exercises  Scapular Retraction with Resistance - 1 x daily - 7 x weekly - 3 sets - 10 reps  Shoulder Extension with Resistance - 1 x daily - 7 x weekly - 3 sets - 10 reps  Shoulder External Rotation with Anchored Resistance - 1 x daily - 7 x weekly - 3 sets - 10 reps      Access Code: [facility]  URL: [employer]/  Date: 04/27/2022  Prepared by: Yanni Distad    Exercises  Standing shoulder flexion wall slides - 1 x daily - 7 x weekly - 2 sets - 10 reps  Standing Shoulder Abduction Slides at Wall - 1 x daily - 7 x weekly - 2 sets - 10 reps  Doorway Pec Stretch at 60 Degrees Abduction with Arm Straight - 1 x daily - 7 x weekly - 10 reps - 10 second hold  Gentle Levator Scapulae Stretch - 1 x daily - 7 x weekly - 3 sets - 30 second hold  Standing Single Arm Elbow Flexion with Resistance - 1 x daily - 7 x weekly - 2 sets - 10 reps  Standing Elbow Extension with Self-Anchored Resistance - 1 x daily - 7 x weekly - 2 sets - 10 reps    Comprehension of Education:  [x] Verbalizes understanding. [x] Demonstrates understanding. [x] Needs review. [] Demonstrates/verbalizes HEP/Ed previously given. Plan: [x] Continue current frequency toward long and short term goals.     [x] Specific Instructions for subsequent treatments: UE ROM and strength/stability exercises      Time In: 4:00pm            Time Out: 4:50pm     Electronically signed by:  Roma Cat, PT

## 2022-04-30 DIAGNOSIS — J30.2 SEASONAL ALLERGIES: ICD-10-CM

## 2022-04-30 DIAGNOSIS — F33.1 MODERATE EPISODE OF RECURRENT MAJOR DEPRESSIVE DISORDER (HCC): ICD-10-CM

## 2022-05-02 RX ORDER — PAROXETINE 10 MG/1
TABLET, FILM COATED ORAL
Qty: 30 TABLET | Refills: 0 | Status: SHIPPED | OUTPATIENT
Start: 2022-05-02 | End: 2022-05-04 | Stop reason: SDUPTHER

## 2022-05-02 RX ORDER — LORATADINE 10 MG/1
TABLET ORAL
Qty: 30 TABLET | Refills: 3 | Status: SHIPPED | OUTPATIENT
Start: 2022-05-02

## 2022-05-02 NOTE — TELEPHONE ENCOUNTER
Appt scheduled for 5/4/22. Patient would like to discuss Paxil dose increase. She states her Prolia injection should be here tomorrow 5/3/22 and therefore would like to get injection Wedn if it comes in on time.

## 2022-05-03 ENCOUNTER — HOSPITAL ENCOUNTER (OUTPATIENT)
Dept: PHYSICAL THERAPY | Facility: CLINIC | Age: 67
Setting detail: THERAPIES SERIES
Discharge: HOME | End: 2022-05-03
Payer: COMMERCIAL

## 2022-05-03 PROCEDURE — 97110 THERAPEUTIC EXERCISES: CPT

## 2022-05-03 NOTE — FLOWSHEET NOTE
[x] [facility]  Outpatient Rehabilitation &  Therapy  [facility]   [place]: [phone]  F: [phone]      Physical Therapy Daily Treatment Note    Date:  5/3/2022  Patient Name:  Cher Tatum    :  1955  MRN: [id number]  Physician: Dr. Emmy Garcia: Bilateral Upper Extremities   [employer] Vs.  Remain  Auth Req'd after 12 Vs. for [id number] & [id number]  [id number] auth after 20 Vs. No auth for [id number]  $10 Copay    Medical Diagnosis: Bilat UE pain, shoulder pain                   Rehab Codes: M79.601, M79.602, M25.619, M62.81 (Muscle Weakness), M62.9 (Disorder of Muscle), M79.1 (Myalgia)  Onset Date:                Next 's appt. : -  Visit# / total visits:      Cancels/No Shows: 0/0    Subjective:    Pain:  [x] Yes  [] No Location: bilat UE, lower back     Pain Rating: (0-10 scale) 4/10  Pain altered Tx:  [x] No  [] Yes  Action:  Comments: Pt arrives stating she is in pain and she thinks its from the exercises. Pt states pain in bilateral shoulder, low back and neck. Pt reports she has not begun her job at [employer] yet because they set her orientation back and does not know when she is going back. Pt reports she has not been compliant with her HEP because Olivia Al has been lazy\" but she has done scapular retractions.         Objective:  Modalities:   Precautions:                               Exercise     Bilat Shoulder Pain  All exercises bilat Reps/ Time Weight/ Level Comments             Pulleys   3'/3'  flex/abd               Levator Scap Stretch  3x30\"  bilat HEP   Upper Trap Stretch  3x30\"   bilat HEP   Scapular Retractions  x10   HEP   Corner single arm pect stretch 3x20\"  bilat HEP             Supine          Wand flexion AAROM  2x10      Wand ER AAROM  2x10            Sidelying       shld abd  2x10     shld ER  2x10                Wall Slides - flexion    bilat arms 2x10  HEP  Using towel this date    Wall slides - abd Single arm  2x10   Using towel          TBand bicep curls 2x10 orange HEP   TBand elbow ext x10 orange HEP   TBand rows 2x10  orange  HEP   TBand extension  2x10  orange  HEP   TBand bilat ER 2x10   orange  HEP                                 Specific instructions for next treatment:        Treatment Charges: Mins Units   []  Modalities     [x]  Ther Exercise 39 3   []  Manual Therapy     []  Ther Activities     []  Aquatics     []  Vasocompression     []  Other     Total Treatment time 39 3       Assessment: [x] Progressing toward goals. Continued with program, pt requires increased time to complete exercises. Pt notes increased difficulty with resisted bilateral external rotation, no pain. Pt notes at end of session feeling better. Educated pt to perform resisted UE exercises at least once a day to increased strength due to quick fatigue this date. Will continue to progress program as tolerated next visit. [] No change. [] Other:  [x] Patient would continue to benefit from skilled physical therapy services in order to: ROM, strength and mobility in her bilat UE's to ease function with ADLs    STG/LTG     Goals  MET NOT MET ON-  GOING  Details   Date Addressed:            STG: To be met in 10 treatments  ?          1. ? Pain: Decrease pain levels to 4/10 with ADLs []? ?  []??  []??      2. ? ROM: Increase flexibility and AROM limitations throughout to equal bilat to reduce difficulty with ADLs []? ?  []??  []??      3. ? Strength: Increase MMT to 5/5 throughout to ease functional limitations and mobility  []? ?  []??  []??      4. Independent with Home Exercise Programs []? ?  []??  []??        []? ?  []??  []??      Date Addressed:            LTG: To be met in 20 treatments           1. Improve score on assessment tool Quick DASH  from 43% impairment to less than 20% impairment  []??  []??  []??      2. Reduce pain levels to 2/10 or less with ADLs []? ?  []??  []??      3.  Patient to perform ADLs with overhead motion at home without difficult []? ?  []??  []??                           Patient goals: Improve ROM      Rehab Potential:  [x]? Good  []? Fair  []? Poor    Suggested Professional Referral:  [x]? No  []? Yes:  Barriers to Goal Achievement[de-identified]  [x]? No  []? Yes:  Domestic Concerns:  [x]? No  []? Yes:      Pt. Education:  [x] Yes  [] No  [x] Reviewed Prior HEP/Ed  Method of Education: [x] Verbal  [x] Demo  [x] Written  Added remaining Tband exercises. Access Code: [email or web address]  URL: [email or web address]  Date: 04/29/2022  Prepared by: Brenda Laurent    Exercises  Scapular Retraction with Resistance - 1 x daily - 7 x weekly - 3 sets - 10 reps  Shoulder Extension with Resistance - 1 x daily - 7 x weekly - 3 sets - 10 reps  Shoulder External Rotation with Anchored Resistance - 1 x daily - 7 x weekly - 3 sets - 10 reps      Access Code: [facility]  URL: [email or web address]  Date: 04/27/2022  Prepared by: Luis Alberto Lepe    Exercises  Standing shoulder flexion wall slides - 1 x daily - 7 x weekly - 2 sets - 10 reps  Standing Shoulder Abduction Slides at Wall - 1 x daily - 7 x weekly - 2 sets - 10 reps  Doorway Pec Stretch at 60 Degrees Abduction with Arm Straight - 1 x daily - 7 x weekly - 10 reps - 10 second hold  Gentle Levator Scapulae Stretch - 1 x daily - 7 x weekly - 3 sets - 30 second hold  Standing Single Arm Elbow Flexion with Resistance - 1 x daily - 7 x weekly - 2 sets - 10 reps  Standing Elbow Extension with Self-Anchored Resistance - 1 x daily - 7 x weekly - 2 sets - 10 reps    Comprehension of Education:  [x] Verbalizes understanding. [x] Demonstrates understanding. [x] Needs review. [] Demonstrates/verbalizes HEP/Ed previously given. Plan: [x] Continue current frequency toward long and short term goals.     [x] Specific Instructions for subsequent treatments: UE ROM and strength/stability exercises      Time In: 10:30am            Time Out: 11:15am      Electronically signed by:  [place] Drop, PTA

## 2022-05-04 ENCOUNTER — OFFICE VISIT (OUTPATIENT)
Dept: FAMILY MEDICINE CLINIC | Age: 67
End: 2022-05-04
Payer: COMMERCIAL

## 2022-05-04 VITALS
DIASTOLIC BLOOD PRESSURE: 86 MMHG | WEIGHT: 127 LBS | HEIGHT: 65 IN | SYSTOLIC BLOOD PRESSURE: 130 MMHG | BODY MASS INDEX: 21.16 KG/M2

## 2022-05-04 DIAGNOSIS — M81.0 OSTEOPOROSIS WITHOUT CURRENT PATHOLOGICAL FRACTURE, UNSPECIFIED OSTEOPOROSIS TYPE: ICD-10-CM

## 2022-05-04 DIAGNOSIS — F33.1 MODERATE EPISODE OF RECURRENT MAJOR DEPRESSIVE DISORDER (HCC): Primary | ICD-10-CM

## 2022-05-04 DIAGNOSIS — M62.838 NIGHT MUSCLE SPASMS: ICD-10-CM

## 2022-05-04 PROCEDURE — G8427 DOCREV CUR MEDS BY ELIG CLIN: HCPCS | Performed by: NURSE PRACTITIONER

## 2022-05-04 PROCEDURE — G8420 CALC BMI NORM PARAMETERS: HCPCS | Performed by: NURSE PRACTITIONER

## 2022-05-04 PROCEDURE — 99213 OFFICE O/P EST LOW 20 MIN: CPT | Performed by: NURSE PRACTITIONER

## 2022-05-04 PROCEDURE — 3017F COLORECTAL CA SCREEN DOC REV: CPT | Performed by: NURSE PRACTITIONER

## 2022-05-04 PROCEDURE — 1036F TOBACCO NON-USER: CPT | Performed by: NURSE PRACTITIONER

## 2022-05-04 PROCEDURE — 96372 THER/PROPH/DIAG INJ SC/IM: CPT | Performed by: NURSE PRACTITIONER

## 2022-05-04 RX ORDER — GABAPENTIN 100 MG/1
100 CAPSULE ORAL 3 TIMES DAILY
Qty: 90 CAPSULE | Refills: 2 | Status: SHIPPED | OUTPATIENT
Start: 2022-05-04 | End: 2022-05-19 | Stop reason: SDUPTHER

## 2022-05-04 RX ORDER — PAROXETINE 10 MG/1
TABLET, FILM COATED ORAL
Qty: 60 TABLET | Refills: 0 | Status: SHIPPED | OUTPATIENT
Start: 2022-05-04 | End: 2022-05-19 | Stop reason: SDUPTHER

## 2022-05-04 SDOH — ECONOMIC STABILITY: INCOME INSECURITY: IN THE LAST 12 MONTHS, WAS THERE A TIME WHEN YOU WERE NOT ABLE TO PAY THE MORTGAGE OR RENT ON TIME?: NO

## 2022-05-04 SDOH — ECONOMIC STABILITY: HOUSING INSECURITY
IN THE LAST 12 MONTHS, WAS THERE A TIME WHEN YOU DID NOT HAVE A STEADY PLACE TO SLEEP OR SLEPT IN A SHELTER (INCLUDING NOW)?: NO

## 2022-05-04 SDOH — ECONOMIC STABILITY: TRANSPORTATION INSECURITY
IN THE PAST 12 MONTHS, HAS THE LACK OF TRANSPORTATION KEPT YOU FROM MEDICAL APPOINTMENTS OR FROM GETTING MEDICATIONS?: NO

## 2022-05-04 SDOH — ECONOMIC STABILITY: FOOD INSECURITY: WITHIN THE PAST 12 MONTHS, THE FOOD YOU BOUGHT JUST DIDN'T LAST AND YOU DIDN'T HAVE MONEY TO GET MORE.: NEVER TRUE

## 2022-05-04 SDOH — ECONOMIC STABILITY: FOOD INSECURITY: WITHIN THE PAST 12 MONTHS, YOU WORRIED THAT YOUR FOOD WOULD RUN OUT BEFORE YOU GOT MONEY TO BUY MORE.: NEVER TRUE

## 2022-05-04 ASSESSMENT — PATIENT HEALTH QUESTIONNAIRE - PHQ9
SUM OF ALL RESPONSES TO PHQ QUESTIONS 1-9: 12
10. IF YOU CHECKED OFF ANY PROBLEMS, HOW DIFFICULT HAVE THESE PROBLEMS MADE IT FOR YOU TO DO YOUR WORK, TAKE CARE OF THINGS AT HOME, OR GET ALONG WITH OTHER PEOPLE: 1
7. TROUBLE CONCENTRATING ON THINGS, SUCH AS READING THE NEWSPAPER OR WATCHING TELEVISION: 0
5. POOR APPETITE OR OVEREATING: 0
3. TROUBLE FALLING OR STAYING ASLEEP: 3
SUM OF ALL RESPONSES TO PHQ9 QUESTIONS 1 & 2: 6
6. FEELING BAD ABOUT YOURSELF - OR THAT YOU ARE A FAILURE OR HAVE LET YOURSELF OR YOUR FAMILY DOWN: 0
2. FEELING DOWN, DEPRESSED OR HOPELESS: 3
9. THOUGHTS THAT YOU WOULD BE BETTER OFF DEAD, OR OF HURTING YOURSELF: 0
SUM OF ALL RESPONSES TO PHQ QUESTIONS 1-9: 12
8. MOVING OR SPEAKING SO SLOWLY THAT OTHER PEOPLE COULD HAVE NOTICED. OR THE OPPOSITE, BEING SO FIGETY OR RESTLESS THAT YOU HAVE BEEN MOVING AROUND A LOT MORE THAN USUAL: 0
SUM OF ALL RESPONSES TO PHQ QUESTIONS 1-9: 12
4. FEELING TIRED OR HAVING LITTLE ENERGY: 3
SUM OF ALL RESPONSES TO PHQ QUESTIONS 1-9: 12
1. LITTLE INTEREST OR PLEASURE IN DOING THINGS: 3

## 2022-05-04 ASSESSMENT — ENCOUNTER SYMPTOMS
CHEST TIGHTNESS: 0
SHORTNESS OF BREATH: 0

## 2022-05-04 ASSESSMENT — SOCIAL DETERMINANTS OF HEALTH (SDOH): HOW HARD IS IT FOR YOU TO PAY FOR THE VERY BASICS LIKE FOOD, HOUSING, MEDICAL CARE, AND HEATING?: NOT HARD AT ALL

## 2022-05-04 NOTE — PROGRESS NOTES
Meliton Heath is a 61 y.o. female who presents in office today with Self medication specific follow up    Chief Complaint   Patient presents with    Osteoporosis     Prolia injection     Medication Check     would like to increase paxil         History of Present Illness:     HPI    Symptoms are improved on Paxil. Would like to increase dose. PHQ-9 score improved from 17 to 12. She has received Prolia injection in the mail. She is taking 300 mg of gabapentin twice daily, prescribed by [facility], and feels it might be too strong. Her muscle spasms have started to come back. Wondering about changing to 200 mg twice daily. Burning of feet has come back as well. Occurs when elevating her feet. Has not been back to [facility] due to insurance not covering. Care gaps: COVID-19 vaccine:  Colon CA screening:  Vaccines:   Hepatitis C/HIV screens:   Breast CA screening:  OB/GYN, cervical CA screening:    Health Maintenance Due   Topic Date Due    Breast cancer screen  Never done    A1C test (Diabetic or Prediabetic)  05/14/2022        Patient Care Team:  SOFIA Palumbo CNP as PCP - General (Nurse Practitioner)  SOFIA Palumbo CNP as PCP - [facility] Empaneled Provider    Reviewed     [x] Past Medical, Family, and Social History was reviewed per writer and does contribute to the patient presenting condition.     [x] Laboratory Results, Vital signs, Imaging, Active Problems, Immunizations, Current/Recently Discontinued Medications, Health Maintenance Activities Due, Referral Notes (if available) were reviewed per writer     [x] Reviewed Depression screening if taken or valid today or any other valid screening tool (others seen below) Interpretation of Total Score DepressionSeverity: 1-4 = Minimal depression, 5-9 = Mild depression, 10-14 = Moderate depression, 15-19 = Moderately severe depression, 20-27 =Severe depression    PHQ Scores 5/4/2022 4/7/2022 5/10/2021 4/22/2021 PHQ2 Score 6 6 0 0   PHQ9 Score 12 17 0 0     Interpretation of Total Score Depression Severity: 1-4 = Minimal depression, 5-9 = Mild depression, 10-14 = Moderate depression, 15-19 = Moderately severe depression, 20-27 = Severe depression     Review of Systems (Subjective)     Review of Systems   Constitutional: Positive for appetite change (poor appetite). Negative for activity change, fatigue and fever. Respiratory: Negative for chest tightness and shortness of breath. Cardiovascular: Negative for chest pain and palpitations. Psychiatric/Behavioral: Positive for dysphoric mood and sleep disturbance. Negative for self-injury and suicidal ideas. The patient is nervous/anxious. See HPI     Physical Assessment (Objective)     /86 (Site: Left Upper Arm, Position: Sitting, Cuff Size: Medium Adult)   Ht 5' 5\" (1.651 m)   Wt 127 lb (57.6 kg)   BMI 21.13 kg/m²      Physical Exam  Vitals reviewed. Constitutional:       Appearance: She is normal weight. HENT:      Head: Normocephalic and atraumatic. Eyes:      Extraocular Movements: Extraocular movements intact. Conjunctiva/sclera: Conjunctivae normal.   Cardiovascular:      Rate and Rhythm: Normal rate and regular rhythm. Pulses: Normal pulses. Heart sounds: Normal heart sounds. No murmur heard. Pulmonary:      Effort: Pulmonary effort is normal. No respiratory distress. Breath sounds: Normal breath sounds. No wheezing. Abdominal:      General: Bowel sounds are normal.      Palpations: Abdomen is soft. Musculoskeletal:         General: No swelling. Cervical back: Normal range of motion. Skin:     General: Skin is warm and dry. Capillary Refill: Capillary refill takes less than 2 seconds. Neurological:      Mental Status: She is alert and oriented to person, place, and time. Psychiatric:         Mood and Affect: Mood normal.         Behavior: Behavior normal.         Thought Content:  Thought content normal.         Judgment: Judgment normal.         Diagnoses / Plan:   1. Moderate episode of recurrent major depressive disorder ([facility] [address]  2. Night muscle spasms  3. Osteoporosis without current pathological fracture, unspecified osteoporosis type  -     denosumab (PROLIA) SC injection 60 mg; 60 mg, SubCUTAneous, ONCE, 1 dose, On Wed 5/4/22 at 1500       Understanding and agreement was voiced with all above plans. All questions answered to satisfaction. Encouraged healthy diet and exercise. Call office with any questions or new or worsening symptoms or concerns. Return in about 3 months (around 8/4/2022), or if symptoms worsen or fail to improve, for anxiety/dep follow up, Med Check. Electronically signed by SOFIA Bone CNP on 5/20/2022 at 3:59 PM    Note is dictated utilizing voice recognition software. Unfortunately this leads to occasional typographical errors. Please contact our office if you have any questions.

## 2022-05-10 ENCOUNTER — HOSPITAL ENCOUNTER (OUTPATIENT)
Dept: PHYSICAL THERAPY | Facility: CLINIC | Age: 67
Setting detail: THERAPIES SERIES
Discharge: HOME | End: 2022-05-10
Payer: COMMERCIAL

## 2022-05-10 NOTE — FLOWSHEET NOTE
[] [facility] ([facility]) - [facility] &  Therapy  [address]    [phone]  F: [phone]   [] [address]  [address] [place] [facility]\"" [address]   [address]  P: [phone]  F: [phone]  [] [address] &  Therapy  [address]  P: [phone]  F: [phone] [] [address]  P: [phone]  F: [phone]  [x] [address]  [address] [address] [facility] [address]   [address]   [place]: [phone]  F: [phone]   [] [facility]  [address] [facility] [address]  [place]: [phone]   F: [phone]     Physical Therapy Cancel/No Show note    Date: 5/10/2022  Patient: Sonido Landaverde  : 1955  MRN: [id number]    Cancels/No Shows to date: 1    For today's appointment patient:    [x]  Cancelled    [] Rescheduled appointment    [] No-show     Reason given by patient:    []  Patient ill    []  Conflicting appointment    [] No transportation      [] Conflict with work    [x] No reason given    [] Weather related    [] [id number]    [] Other:      Comments:        [x] Next appointment was confirmed    Electronically signed by: Joe Talavera

## 2022-05-17 ENCOUNTER — HOSPITAL ENCOUNTER (OUTPATIENT)
Dept: PHYSICAL THERAPY | Facility: CLINIC | Age: 67
Setting detail: THERAPIES SERIES
Discharge: HOME | End: 2022-05-17
Payer: COMMERCIAL

## 2022-05-17 PROCEDURE — 97110 THERAPEUTIC EXERCISES: CPT

## 2022-05-17 NOTE — FLOWSHEET NOTE
[x] [facility]  Outpatient Rehabilitation &  Therapy  [facility]   [place]: [phone]  F: [phone]      Physical Therapy Daily Treatment Note    Date:  2022  Patient Name:  Kory Keith    :  1955  MRN: [id number]  Physician: Dr. Giuliana Alva: Bilateral Upper Extremities   [employer] Vs.  Remain  Auth Req'd after 12 Vs. for [id number] & [id number]  [id number] auth after 20 Vs. No auth for [id number]  $10 Copay    Medical Diagnosis: Bilat UE pain, shoulder pain                   Rehab Codes: M79.601, M79.602, M25.619, M62.81 (Muscle Weakness), M62.9 (Disorder of Muscle), M79.1 (Myalgia)  Onset Date:                Next 's appt. : -  Visit# / total visits:      Cancels/No Shows: 0/0    Subjective:    Pain:  [x] Yes  [] No Location: bilat UE, lower back  Pain Rating: (0-10 scale) 4/10  Pain altered Tx:  [x] No  [] Yes  Action:  Comments: Pt arrives today with bilat shoulder and bicep pain that is worse with activity.  Reports she is keeping up with her HEP     Objective:  Precautions:                               Exercise     Bilat Shoulder Pain  All exercises bilat Reps/ Time Weight/ Level Comments   Bike 8'               Levator Scap Stretch  3x30\"  bilat HEP   Upper Trap Stretch  3x30\"   bilat HEP   Scapular Retractions  x10   HEP   Corner single arm pect stretch 3x20\"  bilat HEP             Standing       Wand flex 1#      Wand Abd 1#            Sidelying       ER 2x10     Abd 2x10            Supine      Punches 2x10     ABCs x2                     Wall Slides - flexion    bilat arms 2x10  HEP  Using towel this date    Wall slides - abd   Single arm  2x10   Using towel    Bands:      Rows 2x10 Green HEP   Extension x10 Green HEP   ER 2x10  Green HEP   IR 2x10  Green HEP   Bicep 2x10   Green HEP   Tricep 2x10 Green     HAB 2x10 Green                      Specific instructions for next treatment:        Treatment Charges: Mins Units   []  Modalities     [x]  Ther Exercise 40 3   []  Manual Therapy     []  Ther Activities     []  Aquatics     []  Vasocompression     []  Other     Total Treatment time 40 3       Assessment: [x] Progressing toward goals. Continued with program, pt requires increased time to complete exercises. Pt notes increased difficulty with exercises especially with overhead motions. Reviewed HEP with good understanding. [] No change. [] Other:  [x] Patient would continue to benefit from skilled physical therapy services in order to: ROM, strength and mobility in her bilat UE's to ease function with ADLs    STG/LTG     Goals  MET NOT MET ON-  GOING  Details   Date Addressed:            STG: To be met in 10 treatments  ?          1. ? Pain: Decrease pain levels to 4/10 with ADLs []? ?  []??  []??      2. ? ROM: Increase flexibility and AROM limitations throughout to equal bilat to reduce difficulty with ADLs []? ?  []??  []??      3. ? Strength: Increase MMT to 5/5 throughout to ease functional limitations and mobility  []? ?  []??  []??      4. Independent with Home Exercise Programs []? ?  []??  []??        []? ?  []??  []??      Date Addressed:            LTG: To be met in 20 treatments           1. Improve score on assessment tool Quick DASH  from 43% impairment to less than 20% impairment  []??  []??  []??      2. Reduce pain levels to 2/10 or less with ADLs []? ?  []??  []??      3. Patient to perform ADLs with overhead motion at home without difficult []? ?  []??  []??                           Patient goals: Improve ROM      Rehab Potential:  [x]? Good  []? Fair  []? Poor    Suggested Professional Referral:  [x]? No  []? Yes:  Barriers to Goal Achievement[de-identified]  [x]? No  []? Yes:  Domestic Concerns:  [x]? No  []? Yes:      Pt. Education:  [x] Yes  [] No  [x] Reviewed Prior HEP/Ed  Method of Education: [x] Verbal  [x] Demo  [x] Written  Added remaining Tband exercises.      Access Code: [email or web address]  URL: [email or web address]  Date: 04/29/2022  Prepared by: Swathi Ramirez    Exercises  Scapular Retraction with Resistance - 1 x daily - 7 x weekly - 3 sets - 10 reps  Shoulder Extension with Resistance - 1 x daily - 7 x weekly - 3 sets - 10 reps  Shoulder External Rotation with Anchored Resistance - 1 x daily - 7 x weekly - 3 sets - 10 reps      Access Code: [facility]  URL: [email or web address]  Date: 04/27/2022  Prepared by: Christ Evans    Exercises  Standing shoulder flexion wall slides - 1 x daily - 7 x weekly - 2 sets - 10 reps  Standing Shoulder Abduction Slides at Wall - 1 x daily - 7 x weekly - 2 sets - 10 reps  Doorway Pec Stretch at 60 Degrees Abduction with Arm Straight - 1 x daily - 7 x weekly - 10 reps - 10 second hold  Gentle Levator Scapulae Stretch - 1 x daily - 7 x weekly - 3 sets - 30 second hold  Standing Single Arm Elbow Flexion with Resistance - 1 x daily - 7 x weekly - 2 sets - 10 reps  Standing Elbow Extension with Self-Anchored Resistance - 1 x daily - 7 x weekly - 2 sets - 10 reps    Comprehension of Education:  [x] Verbalizes understanding. [x] Demonstrates understanding. [x] Needs review. [] Demonstrates/verbalizes HEP/Ed previously given. Plan: [x] Continue current frequency toward long and short term goals.     [x] Specific Instructions for subsequent treatments: UE ROM and strength/stability exercises      Time In: 1050            Time Out: 1150    Electronically signed by:  Jing Awan PT

## 2022-05-19 ENCOUNTER — PATIENT MESSAGE (OUTPATIENT)
Dept: FAMILY MEDICINE CLINIC | Age: 67
End: 2022-05-19

## 2022-05-19 DIAGNOSIS — M62.838 NIGHT MUSCLE SPASMS: ICD-10-CM

## 2022-05-19 DIAGNOSIS — F33.1 MODERATE EPISODE OF RECURRENT MAJOR DEPRESSIVE DISORDER (HCC): ICD-10-CM

## 2022-05-19 RX ORDER — PAROXETINE 10 MG/1
TABLET, FILM COATED ORAL
Qty: 180 TABLET | Refills: 0 | Status: SHIPPED | OUTPATIENT
Start: 2022-05-19 | End: 2022-09-06

## 2022-05-19 RX ORDER — GABAPENTIN 100 MG/1
100 CAPSULE ORAL 3 TIMES DAILY
Qty: 270 CAPSULE | Refills: 1 | Status: SHIPPED | OUTPATIENT
Start: 2022-05-19 | End: 2022-06-18

## 2022-05-19 NOTE — TELEPHONE ENCOUNTER
From: Tiffany Rendon  To: Lizzy Rowe  Sent: 5/19/2022 12:02 PM EDT  Subject: Recent prescription change    Hi [facility],  I'm doing better with the extra 100 mg of Gabapentin and I'm doing well taking 15 mg of the Paxel. I'm going to need a 90 day supply since I'll be out of town until the middle of August. Thanks and have a great day!  Anita Sung

## 2022-05-20 ENCOUNTER — HOSPITAL ENCOUNTER (OUTPATIENT)
Dept: PHYSICAL THERAPY | Facility: CLINIC | Age: 67
Setting detail: THERAPIES SERIES
Discharge: HOME | End: 2022-05-20
Payer: COMMERCIAL

## 2022-05-20 PROCEDURE — 97110 THERAPEUTIC EXERCISES: CPT

## 2022-05-20 NOTE — FLOWSHEET NOTE
[x] [facility]  Outpatient Rehabilitation &  Therapy  [facility]   [place]: [phone]  F: [phone]      Physical Therapy Daily Treatment Note    Date:  2022  Patient Name:  Madeleine Ayon    :  1955  MRN: [id number]  Physician: Dr. Pankaj Eaton: Bilateral Upper Extremities   [employer] Vs.  Remain  Auth Req'd after 12 Vs. for [id number] & [id number]  [id number] auth after 20 Vs. No auth for [id number]  $10 Copay    Medical Diagnosis: Bilat UE pain, shoulder pain                   Rehab Codes: M79.601, M79.602, M25.619, M62.81 (Muscle Weakness), M62.9 (Disorder of Muscle), M79.1 (Myalgia)  Onset Date:                Next 's appt. : -  Visit# / total visits:      Cancels/No Shows: 0/0    Subjective:    Pain:  [] Yes  [x] No Location: bilat UE, lower back  Pain Rating: (0-10 scale) 0/10  Pain altered Tx:  [x] No  [] Yes  Action:  Comments: Patient arrived noting no pain this date just mild soreness. Patient notes that she has been able to perform more overhead motions with no pain.      Objective:  Precautions:                               Exercise     Bilat Shoulder Pain  All exercises bilat Reps/ Time Weight/ Level Comments   Bike 10'               Levator Scap Stretch  3x30\"  bilat HEP   Upper Trap Stretch  3x30\"   bilat HEP   Scapular Retractions  x10   HEP   Corner single arm pect stretch 3x20\"  bilat HEP             Standing       Wand flex 1#      Wand Abd 1#            Sidelying       ER 2x10  1#    Abd 2x10  1#          Supine      Punches 2x10  1#    ABCs x2                     Wall Slides - flexion    bilat arms 2x10  HEP  Using towel this date    Wall slides - abd   Single arm  2x10   Using towel    Bands:      Rows 2x10 Green HEP   Extension x10 Green HEP   ER 2x10  Green HEP   IR 2x10  Green HEP   Bicep 2x10   Green HEP   Tricep 2x10 Green     HAB 2x10 Green                      Specific instructions for next treatment:      Treatment Charges: Mins Units   []  Modalities     [x]  Ther Exercise 40 3   []  Manual Therapy     []  Ther Activities     []  Aquatics     []  Vasocompression     []  Other     Total Treatment time 40 3       Assessment: [x] Progressing toward goals. Progressed strength program this date. Patient notes soreness with progressions with no increase in pain. Improved overhead mobility with decreased discomfort. [] No change. [] Other:  [x] Patient would continue to benefit from skilled physical therapy services in order to: ROM, strength and mobility in her bilat UE's to ease function with ADLs       Goals  MET NOT MET ON-  GOING  Details   Date Addressed:            STG: To be met in 10 treatments  ?          1. ? Pain: Decrease pain levels to 4/10 with ADLs []? ?  []??  []??      2. ? ROM: Increase flexibility and AROM limitations throughout to equal bilat to reduce difficulty with ADLs []? ?  []??  []??      3. ? Strength: Increase MMT to 5/5 throughout to ease functional limitations and mobility  []? ?  []??  []??      4. Independent with Home Exercise Programs []? ?  []??  []??        []? ?  []??  []??      Date Addressed:            LTG: To be met in 20 treatments           1. Improve score on assessment tool Quick DASH  from 43% impairment to less than 20% impairment  []??  []??  []??      2. Reduce pain levels to 2/10 or less with ADLs []? ?  []??  []??      3. Patient to perform ADLs with overhead motion at home without difficult []? ?  []??  []??                           Patient goals: Improve ROM        Pt. Education:  [x] Yes  [] No  [x] Reviewed Prior HEP/Ed  Method of Education: [x] Verbal  [x] Demo  [] Written  Comprehension of Education:  [x] Verbalizes understanding. [x] Demonstrates understanding. [x] Needs review. [] Demonstrates/verbalizes HEP/Ed previously given. Plan: [x] Continue current frequency toward long and short term goals.     [x] Specific Instructions for subsequent treatments: UE ROM and strength/stability exercises       Time In: 1025            Time Out: 1115    Electronically signed by:  Duncan Tenorio PTA

## 2022-05-24 ENCOUNTER — HOSPITAL ENCOUNTER (OUTPATIENT)
Dept: PHYSICAL THERAPY | Facility: CLINIC | Age: 67
Setting detail: THERAPIES SERIES
Discharge: HOME | End: 2022-05-24
Payer: COMMERCIAL

## 2022-05-24 NOTE — FLOWSHEET NOTE
[] [facility]) [facility] &  Therapy  [address]    [phone]  F: [phone]   [] [address] [facility]   [address]  P: [phone]  F: [phone]  [] [place]  [address]  P: [phone]  F: [phone] [] [address]  P: [phone]  F: [phone]  [x] [address]  [address] [address] [facility] [address]   [address]   [place]: [phone]  F: [phone]   [] [facility]  [address] [facility] [address]  [place]: [phone]   F: [phone]     Physical Therapy Cancel/No Show note    Date: 2022  Patient: Dwain Kwan  : 1955  MRN: [id number]    Cancels/No Shows to date: 2    For today's appointment patient:    [x]  Cancelled    [] Rescheduled appointment    [] No-show     Reason given by patient:    [x]  Patient ill    []  Conflicting appointment    [] No transportation      [] Conflict with work    [] No reason given    [] Weather related    [] COVID-19    [x] Other:      Comments:   Pt stated she has been ill for 3 days & she does not feel like coming to PT.      [] Next appointment was confirmed    Electronically signed by: Samina Alvarenga

## 2022-05-27 ENCOUNTER — APPOINTMENT (OUTPATIENT)
Dept: PHYSICAL THERAPY | Facility: CLINIC | Age: 67
End: 2022-05-27
Payer: COMMERCIAL

## 2022-06-01 DIAGNOSIS — F33.1 MODERATE EPISODE OF RECURRENT MAJOR DEPRESSIVE DISORDER (HCC): ICD-10-CM

## 2022-06-01 RX ORDER — PAROXETINE 10 MG/1
TABLET, FILM COATED ORAL
Qty: 30 TABLET | OUTPATIENT
Start: 2022-06-01

## 2022-06-21 NOTE — TELEPHONE ENCOUNTER
Gave pt results and referral information.  JANES Topical Retinoid counseling:  Patient advised to apply a pea-sized amount only at bedtime and wait 30 minutes after washing their face before applying.  If too drying, patient may add a non-comedogenic moisturizer. The patient verbalized understanding of the proper use and possible adverse effects of retinoids.  All of the patient's questions and concerns were addressed.

## 2022-09-02 DIAGNOSIS — F33.1 MODERATE EPISODE OF RECURRENT MAJOR DEPRESSIVE DISORDER (HCC): ICD-10-CM

## 2022-09-06 RX ORDER — PAROXETINE 10 MG/1
TABLET, FILM COATED ORAL
Qty: 180 TABLET | Refills: 0 | Status: SHIPPED | OUTPATIENT
Start: 2022-09-06

## 2022-09-06 NOTE — TELEPHONE ENCOUNTER
Colette Marinelli is calling to request a refill on the following medication(s):    Last Visit Date (If Applicable):  6/2/2643    Next Visit Date:    Visit date not found    Medication Request:  Requested Prescriptions     Pending Prescriptions Disp Refills    PARoxetine (PAXIL) 10 MG tablet [Pharmacy Med Name: PAROXETINE HCL 10 MG TABLET] 180 tablet 0     Sig: TAKE 2 TABLETS BY MOUTH EVERY DAY

## 2022-09-20 ENCOUNTER — TELEPHONE (OUTPATIENT)
Dept: ONCOLOGY | Age: 67
End: 2022-09-20

## 2022-09-20 DIAGNOSIS — Z87.891 PERSONAL HISTORY OF NICOTINE DEPENDENCE: Primary | ICD-10-CM

## 2022-09-20 NOTE — TELEPHONE ENCOUNTER
Our records indicate that your patient is coming due for their annual lung cancer screening follow up testing. For your convenience, we have pended the order for the scan for you. If you do not agree with the need for the test, please cancel the order and let us know. Sincerely,    [address] [place] [address] Screening Program    Auto printed reminder letter sent to patient.

## 2022-09-21 ENCOUNTER — OFFICE VISIT (OUTPATIENT)
Dept: FAMILY MEDICINE CLINIC | Age: 67
End: 2022-09-21
Payer: COMMERCIAL

## 2022-09-21 VITALS
WEIGHT: 136 LBS | HEIGHT: 65 IN | SYSTOLIC BLOOD PRESSURE: 114 MMHG | DIASTOLIC BLOOD PRESSURE: 72 MMHG | BODY MASS INDEX: 22.66 KG/M2 | HEART RATE: 62 BPM | OXYGEN SATURATION: 99 %

## 2022-09-21 DIAGNOSIS — M53.9 MULTILEVEL DEGENERATIVE DISC DISEASE: ICD-10-CM

## 2022-09-21 DIAGNOSIS — Z23 NEED FOR INFLUENZA VACCINATION: ICD-10-CM

## 2022-09-21 DIAGNOSIS — M25.50 MULTIPLE JOINT PAIN: ICD-10-CM

## 2022-09-21 DIAGNOSIS — M81.0 OSTEOPOROSIS WITHOUT CURRENT PATHOLOGICAL FRACTURE, UNSPECIFIED OSTEOPOROSIS TYPE: Primary | ICD-10-CM

## 2022-09-21 DIAGNOSIS — R73.03 PREDIABETES: ICD-10-CM

## 2022-09-21 LAB — HBA1C MFR BLD: 5.3 %

## 2022-09-21 PROCEDURE — 83036 HEMOGLOBIN GLYCOSYLATED A1C: CPT | Performed by: NURSE PRACTITIONER

## 2022-09-21 PROCEDURE — 90471 IMMUNIZATION ADMIN: CPT | Performed by: NURSE PRACTITIONER

## 2022-09-21 PROCEDURE — G8420 CALC BMI NORM PARAMETERS: HCPCS | Performed by: NURSE PRACTITIONER

## 2022-09-21 PROCEDURE — 3017F COLORECTAL CA SCREEN DOC REV: CPT | Performed by: NURSE PRACTITIONER

## 2022-09-21 PROCEDURE — 99214 OFFICE O/P EST MOD 30 MIN: CPT | Performed by: NURSE PRACTITIONER

## 2022-09-21 PROCEDURE — 1036F TOBACCO NON-USER: CPT | Performed by: NURSE PRACTITIONER

## 2022-09-21 PROCEDURE — 90674 CCIIV4 VAC NO PRSV 0.5 ML IM: CPT | Performed by: NURSE PRACTITIONER

## 2022-09-21 PROCEDURE — G8427 DOCREV CUR MEDS BY ELIG CLIN: HCPCS | Performed by: NURSE PRACTITIONER

## 2022-09-21 RX ORDER — CELECOXIB 100 MG/1
100 CAPSULE ORAL DAILY
Qty: 60 CAPSULE | Refills: 3 | Status: SHIPPED | OUTPATIENT
Start: 2022-09-21

## 2022-09-21 ASSESSMENT — PATIENT HEALTH QUESTIONNAIRE - PHQ9
SUM OF ALL RESPONSES TO PHQ9 QUESTIONS 1 & 2: 0
7. TROUBLE CONCENTRATING ON THINGS, SUCH AS READING THE NEWSPAPER OR WATCHING TELEVISION: 1
5. POOR APPETITE OR OVEREATING: 0
1. LITTLE INTEREST OR PLEASURE IN DOING THINGS: 0
SUM OF ALL RESPONSES TO PHQ QUESTIONS 1-9: 2
SUM OF ALL RESPONSES TO PHQ QUESTIONS 1-9: 2
10. IF YOU CHECKED OFF ANY PROBLEMS, HOW DIFFICULT HAVE THESE PROBLEMS MADE IT FOR YOU TO DO YOUR WORK, TAKE CARE OF THINGS AT HOME, OR GET ALONG WITH OTHER PEOPLE: 0
SUM OF ALL RESPONSES TO PHQ QUESTIONS 1-9: 2
8. MOVING OR SPEAKING SO SLOWLY THAT OTHER PEOPLE COULD HAVE NOTICED. OR THE OPPOSITE, BEING SO FIGETY OR RESTLESS THAT YOU HAVE BEEN MOVING AROUND A LOT MORE THAN USUAL: 0
SUM OF ALL RESPONSES TO PHQ QUESTIONS 1-9: 2
4. FEELING TIRED OR HAVING LITTLE ENERGY: 0
2. FEELING DOWN, DEPRESSED OR HOPELESS: 0
6. FEELING BAD ABOUT YOURSELF - OR THAT YOU ARE A FAILURE OR HAVE LET YOURSELF OR YOUR FAMILY DOWN: 0
9. THOUGHTS THAT YOU WOULD BE BETTER OFF DEAD, OR OF HURTING YOURSELF: 0
3. TROUBLE FALLING OR STAYING ASLEEP: 1

## 2022-09-21 ASSESSMENT — ENCOUNTER SYMPTOMS
CHEST TIGHTNESS: 0
SHORTNESS OF BREATH: 0
BACK PAIN: 1

## 2022-09-21 NOTE — PROGRESS NOTES
Prince Mckee is a 61 y.o. female who presents in office today with Self follow up on chronic conditions including:   Patient Active Problem List   Diagnosis    Allergic rhinitis    Osteoporosis without current pathological fracture    Impaired fasting glucose    Elevated LFTs    Burning sensation of feet    Personal history of tobacco use    Muscle spasms of both lower extremities    Night muscle spasms    Subclinical hyperthyroidism    Abnormal TSH       Chief Complaint   Patient presents with    Medication Problem    Dementia        History of Present Illness:     HPI    Here for follow up. Has been taking ibuprofen 800 mg and wondering if she can change to celebrex. Had been taking 1 daily but has not taken the last few days and feels ok with back pain. Wondering about diclofenac topical. Hands and knees very sore in the morning. Has had two Prolia injections. Wondering about repeat DEXA scan. Last DEXA May 2021. Considering trying to stop Paxil. PHQ-9 score of 2 currently. She also recently lost her brother and has had a little difficulty with that. Concern for dementia. Will not be able to recall what she did two days ago. Noticed symptoms similar to her mother, who also had dementia. A1c today 5.3%. Improved from 6.1% in May 2021.        Care gaps: COVID-19 vaccine:    Pfizer x2, April and May 2021; plans to get booster  Colon CA screening:   occult blood negative  Vaccines:    Hepatitis C/HIV screens:  Negative  Breast CA screening:    plans to schedule  CT Lung Screening:    due Oct 20, order signed   OB/GYN, cervical CA screening:     DEXA:   2021 osteoporosis  Depression Screenin    Health Maintenance Due   Topic Date Due    Breast cancer screen  Never done        Patient Care Team:  SOFIA Navarrete CNP as PCP - General (Nurse Practitioner)  SOFIA Navarrete CNP as PCP - [facility] Empaneled Provider    Reviewed     [x] Past Medical, Family, and Social History was reviewed per writer and does contribute to the patient presenting condition. [x] Laboratory Results, Vital signs, Imaging, Active Problems, Immunizations, Current/Recently Discontinued Medications, Health Maintenance Activities Due, Referral Notes (if available) were reviewed per writer     [x] Reviewed Depression screening if taken or valid today or any other valid screening tool (others seen below) Interpretation of Total Score DepressionSeverity: 1-4 = Minimal depression, 5-9 = Mild depression, 10-14 = Moderate depression, 15-19 = Moderately severe depression, 20-27 =Severe depression    PHQ Scores 9/21/2022 5/4/2022 4/7/2022 5/10/2021 4/22/2021   PHQ2 Score 0 6 6 0 0   PHQ9 Score 2 12 17 0 0     Interpretation of Total Score Depression Severity: 1-4 = Minimal depression, 5-9 = Mild depression, 10-14 = Moderate depression, 15-19 = Moderately severe depression, 20-27 = Severe depression     Review of Systems (Subjective)     Review of Systems   Constitutional:  Negative for activity change, appetite change, fatigue and fever. Respiratory:  Negative for chest tightness and shortness of breath. Cardiovascular:  Negative for chest pain and palpitations. Genitourinary:  Negative for difficulty urinating and dysuria. Musculoskeletal:  Positive for arthralgias (chronic bilateral knee pain) and back pain (intermittent chronic). Neurological:  Negative for dizziness, speech difficulty and headaches. Forgetfulness   Psychiatric/Behavioral:  Negative for dysphoric mood, self-injury and sleep disturbance. The patient is not nervous/anxious. See HPI     Physical Assessment (Objective)     /72 (Site: Left Upper Arm, Position: Sitting, Cuff Size: Small Adult)   Pulse 62   Ht 5' 5\" (1.651 m)   Wt 136 lb (61.7 kg)   SpO2 99%   BMI 22.63 kg/m²      Physical Exam  Vitals reviewed. Constitutional:       Appearance: She is normal weight. HENT:      Head: Normocephalic and atraumatic. Right Ear: External ear normal.      Left Ear: External ear normal.      Nose: Nose normal.   Eyes:      Extraocular Movements: Extraocular movements intact. Conjunctiva/sclera: Conjunctivae normal.   Cardiovascular:      Rate and Rhythm: Normal rate and regular rhythm. Pulses: Normal pulses. Heart sounds: Normal heart sounds. No murmur heard. Pulmonary:      Effort: Pulmonary effort is normal. No respiratory distress. Breath sounds: Normal breath sounds. No wheezing. Abdominal:      General: Bowel sounds are normal.      Palpations: Abdomen is soft. Musculoskeletal:         General: No swelling. Cervical back: Normal range of motion. Skin:     General: Skin is warm and dry. Capillary Refill: Capillary refill takes less than 2 seconds. Neurological:      Mental Status: She is alert and oriented to person, place, and time. Psychiatric:         Mood and Affect: Mood normal.         Behavior: Behavior normal.         Thought Content: Thought content normal.         Judgment: Judgment normal.       Diagnoses / Plan:   1. Osteoporosis without current pathological fracture, unspecified osteoporosis type  -     DEXA BONE DENSITY 2 SITES; Future  2. Multiple joint pain  -     celecoxib (CELEBREX) 100 MG capsule; Take 1 capsule by mouth daily, Disp-60 capsule, R-3Normal  -     diclofenac sodium (VOLTAREN) 1 % GEL; Apply 4 g topically 4 times daily, Topical, 4 TIMES DAILY Starting Wed 9/21/2022, Disp-150 g, R-2, Normal  3. Multilevel degenerative disc disease  -     celecoxib (CELEBREX) 100 MG capsule; Take 1 capsule by mouth daily, Disp-60 capsule, R-3Normal  4. Prediabetes  -     POCT glycosylated hemoglobin (Hb A1C)  5. Need for influenza vaccination  -     Influenza, FLUCELVAX, (age 10 mo+), IM, Preservative Free, 0.5 mL     Discussed if she decides to discontinue Paxil, weaning off over 2-4 weeks. Patient to advise with celecoxib, but has not been taking oral NSAID daily.

## 2022-10-07 DIAGNOSIS — M81.0 OSTEOPOROSIS WITHOUT CURRENT PATHOLOGICAL FRACTURE, UNSPECIFIED OSTEOPOROSIS TYPE: ICD-10-CM

## 2022-10-07 RX ORDER — DENOSUMAB 60 MG/ML
INJECTION SUBCUTANEOUS
Qty: 1 EACH | Refills: 2 | Status: SHIPPED | OUTPATIENT
Start: 2022-10-07

## 2022-10-07 NOTE — TELEPHONE ENCOUNTER
Caty Jagdish is calling to request a refill on the following medication(s):    Last Visit Date (If Applicable):  8/18/4411    Next Visit Date:    Visit date not found    Medication Request:  Requested Prescriptions     Pending Prescriptions Disp Refills    PROLIA 60 MG/ML SOSY SC injection [Pharmacy Med Name: Annalisa Santhosh PFS 60MG/ML]  0     Sig: TO BE ADMINISTERED IN PHYSICIAN'S OFFICE. INJECT ONE SYRINGE SUBCUTANEOUSLY ONCE EVERY 6 MONTHS. REFRIGERATE. USE WITHIN 14 DAYS ONCE AT ROOM TEMPERATURE.

## 2022-10-17 ENCOUNTER — TELEPHONE (OUTPATIENT)
Dept: FAMILY MEDICINE CLINIC | Age: 67
End: 2022-10-17

## 2022-10-17 DIAGNOSIS — Z87.891 PERSONAL HISTORY OF TOBACCO USE: Primary | ICD-10-CM

## 2022-10-17 NOTE — TELEPHONE ENCOUNTER
Jose Elias Sher from [facility] scheduling calling to inform the ct lung screening was ordered wrong, patient do not qualify for the test, stated they have to smoke for 20 plus years, patient stated she smoked 3.5 packs for forty years-current, need this order changed

## 2022-12-20 ENCOUNTER — TELEPHONE (OUTPATIENT)
Dept: FAMILY MEDICINE CLINIC | Age: 67
End: 2022-12-20

## 2023-01-10 ENCOUNTER — TELEPHONE (OUTPATIENT)
Dept: FAMILY MEDICINE CLINIC | Age: 68
End: 2023-01-10

## 2023-01-18 ENCOUNTER — OFFICE VISIT (OUTPATIENT)
Dept: FAMILY MEDICINE CLINIC | Age: 68
End: 2023-01-18

## 2023-01-18 VITALS
DIASTOLIC BLOOD PRESSURE: 78 MMHG | BODY MASS INDEX: 23.99 KG/M2 | SYSTOLIC BLOOD PRESSURE: 132 MMHG | OXYGEN SATURATION: 98 % | HEIGHT: 65 IN | HEART RATE: 60 BPM | WEIGHT: 144 LBS

## 2023-01-18 DIAGNOSIS — R73.03 PREDIABETES: ICD-10-CM

## 2023-01-18 DIAGNOSIS — Z13.6 SCREENING FOR CARDIOVASCULAR CONDITION: ICD-10-CM

## 2023-01-18 DIAGNOSIS — Z76.0 MEDICATION REFILL: ICD-10-CM

## 2023-01-18 DIAGNOSIS — E05.90 SUBCLINICAL HYPERTHYROIDISM: ICD-10-CM

## 2023-01-18 DIAGNOSIS — M62.838 NIGHT MUSCLE SPASMS: ICD-10-CM

## 2023-01-18 DIAGNOSIS — G12.20 MOTOR NEURON DISEASE (HCC): ICD-10-CM

## 2023-01-18 DIAGNOSIS — N39.0 URINARY TRACT INFECTION WITHOUT HEMATURIA, SITE UNSPECIFIED: ICD-10-CM

## 2023-01-18 DIAGNOSIS — Z12.31 BREAST CANCER SCREENING BY MAMMOGRAM: ICD-10-CM

## 2023-01-18 DIAGNOSIS — J43.9 PULMONARY EMPHYSEMA, UNSPECIFIED EMPHYSEMA TYPE (HCC): ICD-10-CM

## 2023-01-18 DIAGNOSIS — Z51.81 MEDICATION MONITORING ENCOUNTER: ICD-10-CM

## 2023-01-18 DIAGNOSIS — F33.1 MODERATE EPISODE OF RECURRENT MAJOR DEPRESSIVE DISORDER (HCC): ICD-10-CM

## 2023-01-18 DIAGNOSIS — R79.89 ELEVATED LFTS: ICD-10-CM

## 2023-01-18 DIAGNOSIS — Z00.00 INITIAL MEDICARE ANNUAL WELLNESS VISIT: Primary | ICD-10-CM

## 2023-01-18 DIAGNOSIS — Z12.11 SCREEN FOR COLON CANCER: ICD-10-CM

## 2023-01-18 DIAGNOSIS — M53.9 MULTILEVEL DEGENERATIVE DISC DISEASE: ICD-10-CM

## 2023-01-18 RX ORDER — GABAPENTIN 100 MG/1
200 CAPSULE ORAL NIGHTLY
Qty: 180 CAPSULE | Refills: 0 | Status: SHIPPED | OUTPATIENT
Start: 2023-01-18 | End: 2023-04-18

## 2023-01-18 RX ORDER — PANTOPRAZOLE SODIUM 40 MG/1
40 TABLET, DELAYED RELEASE ORAL DAILY
Qty: 90 TABLET | Refills: 2 | Status: SHIPPED | OUTPATIENT
Start: 2023-01-18

## 2023-01-18 RX ORDER — GABAPENTIN 300 MG/1
CAPSULE ORAL
COMMUNITY
Start: 2022-12-05 | End: 2023-01-18 | Stop reason: DRUGHIGH

## 2023-01-18 RX ORDER — PANTOPRAZOLE SODIUM 40 MG/1
40 TABLET, DELAYED RELEASE ORAL DAILY
COMMUNITY
End: 2023-01-18 | Stop reason: SDUPTHER

## 2023-01-18 ASSESSMENT — PATIENT HEALTH QUESTIONNAIRE - PHQ9
8. MOVING OR SPEAKING SO SLOWLY THAT OTHER PEOPLE COULD HAVE NOTICED. OR THE OPPOSITE, BEING SO FIGETY OR RESTLESS THAT YOU HAVE BEEN MOVING AROUND A LOT MORE THAN USUAL: 0
2. FEELING DOWN, DEPRESSED OR HOPELESS: 0
6. FEELING BAD ABOUT YOURSELF - OR THAT YOU ARE A FAILURE OR HAVE LET YOURSELF OR YOUR FAMILY DOWN: 0
7. TROUBLE CONCENTRATING ON THINGS, SUCH AS READING THE NEWSPAPER OR WATCHING TELEVISION: 0
SUM OF ALL RESPONSES TO PHQ QUESTIONS 1-9: 4
SUM OF ALL RESPONSES TO PHQ QUESTIONS 1-9: 4
1. LITTLE INTEREST OR PLEASURE IN DOING THINGS: 0
4. FEELING TIRED OR HAVING LITTLE ENERGY: 2
SUM OF ALL RESPONSES TO PHQ QUESTIONS 1-9: 4
3. TROUBLE FALLING OR STAYING ASLEEP: 2
10. IF YOU CHECKED OFF ANY PROBLEMS, HOW DIFFICULT HAVE THESE PROBLEMS MADE IT FOR YOU TO DO YOUR WORK, TAKE CARE OF THINGS AT HOME, OR GET ALONG WITH OTHER PEOPLE: 1
9. THOUGHTS THAT YOU WOULD BE BETTER OFF DEAD, OR OF HURTING YOURSELF: 0
5. POOR APPETITE OR OVEREATING: 0
SUM OF ALL RESPONSES TO PHQ QUESTIONS 1-9: 4
SUM OF ALL RESPONSES TO PHQ9 QUESTIONS 1 & 2: 0

## 2023-01-18 ASSESSMENT — LIFESTYLE VARIABLES
HOW OFTEN DO YOU HAVE A DRINK CONTAINING ALCOHOL: MONTHLY OR LESS
HOW MANY STANDARD DRINKS CONTAINING ALCOHOL DO YOU HAVE ON A TYPICAL DAY: 1 OR 2

## 2023-01-18 NOTE — PATIENT INSTRUCTIONS
[facility], [address] [facility], [address] [place] [address]   [phone]       Preventing Falls: Care Instructions  Overview     Getting around your home safely can be a challenge if you have injuries or health problems that make it easy for you to fall. Loose rugs and furniture in walkways are among the dangers for many older people who have problems walking or who have poor eyesight. People who have conditions such as arthritis, osteoporosis, or dementia also have to be careful not to fall. You can make your home safer with a few simple measures. Follow-up care is a key part of your treatment and safety. Be sure to make and go to all appointments, and call your doctor if you are having problems. It's also a good idea to know your test results and keep a list of the medicines you take. How can you care for yourself at home? Taking care of yourself  Exercise regularly to improve your strength, muscle tone, and balance. Walk if you can. Swimming may be a good choice if you cannot walk easily. Have your vision and hearing checked each year or any time you notice a change. If you have trouble seeing and hearing, you might not be able to avoid objects and could lose your balance. Know the side effects of the medicines you take. Ask your doctor or pharmacist whether the medicines you take can affect your balance. Sleeping pills or sedatives can affect your balance. Limit the amount of alcohol you drink. Alcohol can impair your balance and other senses. Ask your doctor whether calluses or corns on your feet need to be removed. If you wear loose-fitting shoes because of calluses or corns, you can lose your balance and fall. Talk to your doctor if you have numbness in your feet. You may get dizzy if you do not drink enough water. To prevent dehydration, drink plenty of fluids. Choose water and other clear liquids.  If you have kidney, heart, or liver disease and have to limit fluids, talk with your doctor before you increase the amount of fluids you drink. Preventing falls at home  Remove raised doorway thresholds, throw rugs, and clutter. Repair loose carpet or raised areas in the floor. Move furniture and electrical cords to keep them out of walking paths. Use nonskid floor wax, and wipe up spills right away, especially on ceramic tile floors. If you use a walker or cane, put rubber tips on it. If you use crutches, clean the bottoms of them regularly with an abrasive pad, such as steel wool. Keep your house well lit, especially stairways, porches, and outside walkways. Use night-lights in areas such as hallways and bathrooms. Add extra light switches or use remote switches (such as switches that go on or off when you clap your hands) to make it easier to turn lights on if you have to get up during the night. Install sturdy handrails on stairways. Move items in your cabinets so that the things you use a lot are on the lower shelves (about waist level). Keep a cordless phone and a flashlight with new batteries by your bed. If possible, put a phone in each of the main rooms of your house, or carry a cell phone in case you fall and cannot reach a phone. Or, you can wear a device around your neck or wrist. You push a button that sends a signal for help. Wear low-heeled shoes that fit well and give your feet good support. Use footwear with nonskid soles. Check the heels and soles of your shoes for wear. Repair or replace worn heels or soles. Do not wear socks without shoes on smooth floors, such as wood. Walk on the grass when the sidewalks are slippery. If you live in an area that gets snow and ice in the winter, sprinkle salt on slippery steps and sidewalks. Or ask a family member or friend to do this for you. Preventing falls in the bath  Install grab bars and nonskid mats inside and outside your shower or tub and near the toilet and sinks. Use shower chairs and bath benches.   Use a hand-held shower head that will allow you to sit while showering. Get into a tub or shower by putting the weaker leg in first. Get out of a tub or shower with your strong side first.  Repair loose toilet seats and consider installing a raised toilet seat to make getting on and off the toilet easier. Keep your bathroom door unlocked while you are in the shower. Where can you learn more? Go to http://www.daniels.com/ and enter G117 to learn more about \"Preventing Falls: Care Instructions. \"  Current as of: May 4, 2022               Content Version: 13.5  © 6995-8389 [employer]. Care instructions adapted under license by [address] If you have questions about a medical condition or this instruction, always ask your healthcare professional. [address] any warranty or liability for your use of this information. Learning About Stress  What is stress? Stress is what you feel when you have to handle more than you are used to. Stress is a fact of life for most people, and it affects everyone differently. What causes stress for you may not be stressful for someone else. A lot of things can cause stress. You may feel stress when you go on a job interview, take a test, or run a race. This kind of short-term stress is normal and even useful. It can help you if you need to work hard or react quickly. For example, stress can help you finish an important job on time. Stress also can last a long time. Long-term stress is caused by stressful situations or events. Examples of long-term stress include long-term health problems, ongoing problems at work, or conflicts in your family. Long-term stress can harm your health. How does stress affect your health? When you are stressed, your body responds as though you are in danger. It makes hormones that speed up your heart, make you breathe faster, and give you a burst of energy.  This is called the fight-or-flight stress response. If the stress is over quickly, your body goes back to normal and no harm is done. But if stress happens too often or lasts too long, it can have bad effects. Long-term stress can make you more likely to get sick, and it can make symptoms of some diseases worse. If you tense up when you are stressed, you may develop neck, shoulder, or low back pain. Stress is linked to high blood pressure and heart disease. Stress also harms your emotional health. It can make you [place], tense, or depressed. Your relationships may suffer, and you may not do well at work or school. What can you do to manage stress? How to relax your mind   Write. It may help to write about things that are bothering you. This helps you find out how much stress you feel and what is causing it. When you know this, you can find better ways to cope. Let your feelings out. Talk, laugh, cry, and express anger when you need to. Talking with friends, family, a counselor, or a member of the clergy about your feelings is a healthy way to relieve stress. Do something you enjoy. For example, listen to music or go to a movie. Practice your hobby or do volunteer work. Meditate. This can help you relax, because you are not worrying about what happened before or what may happen in the future. Do guided imagery. Imagine yourself in any setting that helps you feel calm. You can use audiotapes, books, or a teacher to guide you. How to relax your body   Do something active. Exercise or activity can help reduce stress. Walking is a great way to get started. Even everyday activities such as housecleaning or yard work can help. Do breathing exercises. For example:  From a standing position, bend forward from the waist with your knees slightly bent. Let your arms dangle close to the floor. Breathe in slowly and deeply as you return to a standing position.  Roll up slowly and lift your head last.  Hold your breath for just a few seconds in the standing position. Breathe out slowly and bend forward from the waist.  Try yoga or mukul chi. These techniques combine exercise and meditation. You may need some training at first to learn them. What can you do to prevent stress? Manage your time. This helps you find time to do the things you want and need to do. Get enough sleep. Your body recovers from the stresses of the day while you are sleeping. Get support. Your family, friends, and community can make a difference in how you experience stress. Where can you learn more? Go to http://www.daniels.com/ and enter N032 to learn more about \"Learning About Stress. \"  Current as of: October 6, 2021               Content Version: 13.5  © 4618-8229 [employer]. Care instructions adapted under license by [facility] ([facility]). If you have questions about a medical condition or this instruction, always ask your healthcare professional. [place] [address] any warranty or liability for your use of this information. Hearing Loss: Care Instructions  Overview     Hearing loss is a sudden or slow decrease in how well you hear. It can range from mild to severe. Permanent hearing loss can occur with aging. It also can happen when you are exposed long-term to loud noise. Examples include listening to loud music, riding motorcycles, or being around other loud machines. Hearing loss can affect your work and home life. It can make you feel lonely or depressed. You may feel that you have lost your independence. But hearing aids and other devices can help you hear better and feel connected to others. Follow-up care is a key part of your treatment and safety. Be sure to make and go to all appointments, and call your doctor if you are having problems. It's also a good idea to know your test results and keep a list of the medicines you take. How can you care for yourself at home? Avoid loud noises whenever possible.  This helps keep your hearing from getting worse. Always wear hearing protection around loud noises. Wear a hearing aid as directed. See a professional who can help you pick a hearing aid that fits you. Have hearing tests as your doctor suggests. They can show whether your hearing has changed. Your hearing aid may need to be adjusted. Use other devices as needed. These may include:  Telephone amplifiers and hearing aids that can connect to a television, stereo, radio, or microphone. Devices that use lights or vibrations. These alert you to the doorbell, a ringing telephone, or a baby monitor. Television closed-captioning. This shows the words at the bottom of the screen. Most new TVs can do this. TTY (text telephone). This lets you type messages back and forth on the telephone instead of talking or listening. These devices are also called TDD. When messages are typed on the keyboard, they are sent over the phone line to a receiving TTY. The message is shown on a monitor. Use text messaging, social media, and email if it is hard for you to communicate by telephone. Try to learn a listening technique called speechreading. It is not lipreading. You pay attention to people's gestures, expressions, posture, and tone of voice. These clues can help you understand what a person is saying. Face the person you are talking to, and have them face you. Make sure the lighting is good. You need to see the other person's face clearly. Think about counseling if you need help to adjust to your hearing loss. When should you call for help? Watch closely for changes in your health, and be sure to contact your doctor if:    You think your hearing is getting worse. You have new symptoms, such as dizziness or nausea. Where can you learn more? Go to http://www.daniels.com/ and enter R798 to learn more about \"Hearing Loss: Care Instructions. \"  Current as of:  May 4, 2022               Content Version: 13.5  © 4402-7557 [employer]. Care instructions adapted under license by [facility] ([facility]). If you have questions about a medical condition or this instruction, always ask your healthcare professional. [address] any warranty or liability for your use of this information. Advance Directives: Care Instructions  Overview  An advance directive is a legal way to state your wishes at the end of your life. It tells your family and your doctor what to do if you can't say what you want. There are two main types of advance directives. You can change them any time your wishes change. Living will. This form tells your family and your doctor your wishes about life support and other treatment. The form is also called a declaration. Medical power of . This form lets you name a person to make treatment decisions for you when you can't speak for yourself. This person is called a health care agent (health care proxy, health care surrogate). The form is also called a durable power of  for health care. If you do not have an advance directive, decisions about your medical care may be made by a family member, or by a doctor or a  who doesn't know you. It may help to think of an advance directive as a gift to the people who care for you. If you have one, they won't have to make tough decisions by themselves. For more information, including forms for your state, see the [address] website (www.caringinfo.org/planning/advance-directives/). Follow-up care is a key part of your treatment and safety. Be sure to make and go to all appointments, and call your doctor if you are having problems. It's also a good idea to know your test results and keep a list of the medicines you take. What should you include in an advance directive? Many states have a unique advance directive form.  (It may ask you to address specific issues.) Or you might use a universal form that's approved by many states. If your form doesn't tell you what to address, it may be hard to know what to include in your advance directive. Use the questions below to help you get started. Who do you want to make decisions about your medical care if you are not able to? What life-support measures do you want if you have a serious illness that gets worse over time or can't be cured? What are you most afraid of that might happen? (Maybe you're afraid of having pain, losing your independence, or being kept alive by machines.)  Where would you prefer to die? (Your home? A hospital? A nursing home?)  Do you want to donate your organs when you die? Do you want certain Mu-ism practices performed before you die? When should you call for help? Be sure to contact your doctor if you have any questions. Where can you learn more? Go to http://www.daniels.com/ and enter R264 to learn more about \"Advance Directives: Care Instructions. \"  Current as of: June 16, 2022               Content Version: 13.5  © 4840-5335 [employer]. Care instructions adapted under license by [facility] ([facility]). If you have questions about a medical condition or this instruction, always ask your healthcare professional. [place] [address] any warranty or liability for your use of this information. Personalized Preventive Plan for Merrick Booth - 1/18/2023  Medicare offers a range of preventive health benefits. Some of the tests and screenings are paid in full while other may be subject to a deductible, co-insurance, and/or copay. Some of these benefits include a comprehensive review of your medical history including lifestyle, illnesses that may run in your family, and various assessments and screenings as appropriate. After reviewing your medical record and screening and assessments performed today your provider may have ordered immunizations, labs, imaging, and/or referrals for you.   A list of these orders (if applicable) as well as your Preventive Care list are included within your After Visit Summary for your review. Other Preventive Recommendations:    A preventive eye exam performed by an eye specialist is recommended every 1-2 years to screen for glaucoma; cataracts, macular degeneration, and other eye disorders. A preventive dental visit is recommended every 6 months. Try to get at least 150 minutes of exercise per week or 10,000 steps per day on a pedometer . Order or download the FREE \"Exercise & Physical Activity: Your Everyday Guide\" from [employer]. Call 7-380.387.7593 or search [employer] online. You need 1856-8290 mg of calcium and 5411-1027 IU of vitamin D per day. It is possible to meet your calcium requirement with diet alone, but a vitamin D supplement is usually necessary to meet this goal.  When exposed to the sun, use a sunscreen that protects against both UVA and UVB radiation with an SPF of 30 or greater. Reapply every 2 to 3 hours or after sweating, drying off with a towel, or swimming. Always wear a seat belt when traveling in a car. Always wear a helmet when riding a bicycle or motorcycle.

## 2023-01-18 NOTE — PROGRESS NOTES
Medicare Annual Wellness Visit    Channing Russell is here for Medicare AWV and Medication Problem (Would like to decrease Gabapentin to 200 mg at night d/t feeling fatigued and groggy the next day)    Assessment & Plan   Initial Medicare annual wellness visit  -     CBC; Future  -     Comprehensive Metabolic Panel, Fasting; Future  -     Lipid Panel; Future  -     TSH with Reflex; Future  Night muscle spasms  -     gabapentin (NEURONTIN) 100 MG capsule; Take 2 capsules by mouth nightly for 90 days. Intended supply: 30 days, Disp-180 capsule, R-0Normal  Pulmonary emphysema, unspecified emphysema type ([facility] [address]  Comments:  Denies any dyspnea. Due for CT Lung Screening  Moderate episode of recurrent major depressive disorder (HCC)  -     CBC; Future  -     Comprehensive Metabolic Panel, Fasting; Future  -     Lipid Panel; Future  -     TSH with Reflex; Future  Medication refill  -     gabapentin (NEURONTIN) 100 MG capsule; Take 2 capsules by mouth nightly for 90 days. Intended supply: 30 days, Disp-180 capsule, R-0Normal  -     pantoprazole (PROTONIX) 40 MG tablet; Take 1 tablet by mouth daily, Disp-90 tablet, R-2Normal  Medication monitoring encounter  -     CBC; Future  -     Comprehensive Metabolic Panel, Fasting; Future  -     Lipid Panel; Future  -     TSH with Reflex; Future  Subclinical hyperthyroidism  -     TSH with Reflex; Future  Elevated LFTs  -     Comprehensive Metabolic Panel, Fasting; Future  Screening for cardiovascular condition  -     Lipid Panel; Future  Prediabetes  -     Lipid Panel; Future  Multilevel degenerative disc disease  -     AFL - Alba Moreira MD, Neurology, [place]  Urinary tract infection without hematuria, site unspecified  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  Recommended screening schedule for the next 5-10 years is provided to the patient in written form: see Patient Instructions/AVS.     Return in 6 months (on 2023), or if symptoms worsen or fail to improve, for Medicare Annual Wellness Visit in 1 year, Med Check, chronic conditions. Subjective   The following acute and/or chronic problems were also addressed today:    Here for Medicare Visit. No longer seeing rheumatologist.     Requesting new referral for neurology locally. She had been seeing neurology at [facility]. Had prescribed 300 mg gabapentin, which she is taking a night. It does help her symptoms, but she feels dose is too high and may be contributing to grogginess. Would like to try 200 mg. Care gaps: COVID-19 vaccine:    Pfizer x3, April and May 2021; booster Oct 2022  Colon CA screening:   occult blood negative, ordered Cologuard  Vaccines:   UTD  Hepatitis C/HIV screens:  Negative  Breast CA screening:   rescheduled 23  CT Lung Screening:    due Oct 20, order signed   OB/GYN, cervical CA screening:     DEXA:   2021 osteoporosis  Depression Screenin --> 4, feels medication very helpful    Patient's complete Health Risk Assessment and screening values have been reviewed and are found in Flowsheets. The following problems were reviewed today and where indicated follow up appointments were made and/or referrals ordered. Positive Risk Factor Screenings with Interventions:    Fall Risk:  Do you feel unsteady or are you worried about falling? : (!) yes  2 or more falls in past year?: (!) yes  Fall with injury in past year?: no     Interventions:    Patient comments: Thinks related to gabapentin that she takes at night. Medication does help with nerve pain but can also cause grogginess. Decrease dose to 200 mg nightly.             General HRA Questions:  Select all that apply: (!) New or Increased Pain, Stress    Pain Interventions:  Patient comments: Had been have problem with constipation. Started taking probiotic and eating more yogurt and pain has improved. Stress Interventions:  Patient comments: Not unmanageable, not all the time. Vision Screen:  Do you have difficulty driving, watching TV, or doing any of your daily activities because of your eyesight?: No  Have you had an eye exam within the past year?: (!) No  No results found. Interventions:   Patient comments: Plans to schedule appointment  Patient encouraged to make appointment with their eye specialist    Safety:  Do you have any tripping hazards - loose or unsecured carpets or rugs?: (!) Yes  Interventions:  Patient comments: Has a few rugs in the kitchen with rubber backing and don't tend to slide. Advanced Directives:  Do you have a Living Will?: (!) No    Intervention:  has NO advanced directive - information provided  Plans to designate daughter as medical POA    Advance Care Planning   Advanced Care Planning: Discussed the patients choices for care and treatment in case of a health event that adversely affects decision-making abilities. Also discussed the patients long-term treatment options. Reviewed with the patient the appropriate state-specific advance directive documents. Reviewed the process of designating a competent adult as an Agent (or -in-fact) that could take make health care decisions for the patient if incompetent. Patient was asked to complete the declaration forms, if they have not already, either acknowledge the forms by a public notary or an eligible witness and provide a signed copy to the practice office. Time spent (minutes): 3      Lung Cancer Screening:  LDCT Screening: Discussed with patient the benefits and harms of screening, follow-up diagnostic testing, over-diagnosis, false positive rate, and total radiation exposure.  Counseled on the importance of adherence to annual lung cancer LDCT screening, impact of comorbidities, ability and willingness to undergo diagnosis and treatment. Counseled on the importance of maintaining cigarette smoking abstinence and cessation. The patient has a history of >20 pack years and is either still smoking or quit within the last 15 years. There are no signs or symptoms of lung cancer.    Back to smoking about 7 cigarettes per day. Had quit for 8 months. Had gradually weaned off when she quit.             Objective   Vitals:    01/18/23 1340   BP: 132/78   Pulse: 60   SpO2: 98%   Weight: 144 lb (65.3 kg)   Height: 5' 5\" (1.651 m)      Body mass index is 23.96 kg/m².      General Appearance: alert and oriented to person, place and time, well-developed and well nourished, and in no acute distress  Skin: warm and dry, no rash or erythema  Pulmonary/Chest: clear to auscultation bilaterally- no wheezes, rales or rhonchi, normal air movement, no respiratory distress  Cardiovascular: normal rate, normal S1 and S2, no gallops, intact distal pulses, and no carotid bruits  Extremities: no cyanosis and no clubbing  Musculoskeletal: normal range of motion, no joint swelling, deformity or tenderness       Allergies   Allergen Reactions    Codeine     Hydrocodone     Tetracycline     Vicodin [Hydrocodone-Acetaminophen]      Prior to Visit Medications    Medication Sig Taking? Authorizing Provider   Actiphyte of [place] LIQD by Does not apply route Yes Historical Provider, MD   Lactobacillus (PROBIOTIC ACIDOPHILUS PO) Take by mouth Yes Historical Provider, MD   gabapentin (NEURONTIN) 100 MG capsule Take 2 capsules by mouth nightly for 90 days. Intended supply: 30 days Yes SOFIA Vigil CNP   pantoprazole (PROTONIX) 40 MG tablet Take 1 tablet by mouth daily Yes SOFIA Vigil CNP   PROLIA 60 MG/ML SOSY SC injection TO BE ADMINISTERED IN PHYSICIAN'S OFFICE. INJECT ONE SYRINGE SUBCUTANEOUSLY ONCE EVERY 6 MONTHS. REFRIGERATE. USE WITHIN 14 DAYS ONCE AT ROOM TEMPERATURE. Yes SOFIA Vigil CNP   diclofenac  sodium (VOLTAREN) 1 % GEL Apply 4 g topically 4 times daily Yes SOFIA Mims CNP   PARoxetine (PAXIL) 10 MG tablet TAKE 2 TABLETS BY MOUTH EVERY DAY Yes SOFIA Mims CNP   albuterol sulfate  (90 Base) MCG/ACT inhaler Inhale 2 puffs into the lungs every 6 hours as needed for Wheezing or Shortness of Breath Yes SOFIA Mims CNP   Handicap Placard MISC by Does not apply route Pt is unable to walk 200 ft without stopping to rest   Duration:  6 months Yes SOFIA Mims CNP   fluticasone (FLONASE) 50 MCG/ACT nasal spray SPRAY 1 SPRAY INTO EACH NOSTRIL EVERY DAY Yes SOFIA Mims CNP   Multiple Vitamins-Minerals (THERAPEUTIC MULTIVITAMIN-MINERALS) tablet Take 1 tablet by mouth daily Yes Historical Provider, MD   vitamin B-12 (CYANOCOBALAMIN) 100 MCG tablet Take 50 mcg by mouth daily  Yes Historical Provider, MD   Calcium Carb-Cholecalciferol 600-500 MG-UNIT CAPS Take by mouth Yes Historical Provider, MD   Flax Oil-Fish Oil-Borage Oil (FISH OIL-FLAX OIL-BORAGE OIL) CAPS Take by mouth  Yes Historical Provider, MD Han (Including outside providers/suppliers regularly involved in providing care):   Patient Care Team:  SOFIA Mims CNP as PCP - General (Nurse Practitioner)  SOFIA Mims CNP as PCP - [facility] Empaneled Provider     Reviewed and updated this visit:  Allergies  Meds

## 2023-02-16 ENCOUNTER — HOSPITAL ENCOUNTER (OUTPATIENT)
Dept: MAMMOGRAPHY | Age: 68
Discharge: HOME | End: 2023-02-18
Payer: MEDICARE

## 2023-02-16 DIAGNOSIS — Z12.31 BREAST CANCER SCREENING BY MAMMOGRAM: ICD-10-CM

## 2023-02-16 PROCEDURE — 77067 SCR MAMMO BI INCL CAD: CPT

## 2023-02-17 ENCOUNTER — PATIENT MESSAGE (OUTPATIENT)
Dept: FAMILY MEDICINE CLINIC | Age: 68
End: 2023-02-17

## 2023-02-17 DIAGNOSIS — F33.1 MODERATE EPISODE OF RECURRENT MAJOR DEPRESSIVE DISORDER (HCC): ICD-10-CM

## 2023-02-17 DIAGNOSIS — M25.50 MULTIPLE JOINT PAIN: ICD-10-CM

## 2023-02-17 RX ORDER — PAROXETINE 10 MG/1
TABLET, FILM COATED ORAL
Qty: 180 TABLET | Refills: 0 | Status: SHIPPED | OUTPATIENT
Start: 2023-02-17

## 2023-02-17 NOTE — TELEPHONE ENCOUNTER
From: Denisse German  To: Shikha Barraza  Sent: 2/17/2023 11:48 AM EST  Subject: RX refills    Can you send my prescriptions to [facility] ([facility] ) - Efra Heredia [address] [phone] [facility] [phone]  [address] [place] [address]. Gulshan Campos [phone] for Diclofenac Sodium Topical Gel 1% and Paroxetine 10 mg. Thank you.

## 2023-03-08 ENCOUNTER — HOSPITAL ENCOUNTER (OUTPATIENT)
Age: 68
Setting detail: SPECIMEN
Discharge: HOME | End: 2023-03-08

## 2023-03-08 DIAGNOSIS — Z00.00 INITIAL MEDICARE ANNUAL WELLNESS VISIT: ICD-10-CM

## 2023-03-08 DIAGNOSIS — Z51.81 MEDICATION MONITORING ENCOUNTER: ICD-10-CM

## 2023-03-08 DIAGNOSIS — F33.1 MODERATE EPISODE OF RECURRENT MAJOR DEPRESSIVE DISORDER (HCC): ICD-10-CM

## 2023-03-08 DIAGNOSIS — E05.90 SUBCLINICAL HYPERTHYROIDISM: ICD-10-CM

## 2023-03-08 DIAGNOSIS — R73.03 PREDIABETES: ICD-10-CM

## 2023-03-08 DIAGNOSIS — R79.89 ELEVATED LFTS: ICD-10-CM

## 2023-03-08 DIAGNOSIS — Z13.6 SCREENING FOR CARDIOVASCULAR CONDITION: ICD-10-CM

## 2023-03-08 LAB
ALBUMIN SERPL-MCNC: 4.4 G/DL (ref 3.5–5.2)
ALBUMIN/GLOBULIN RATIO: 1.5 (ref 1–2.5)
ALP SERPL-CCNC: 75 U/L (ref 35–104)
ALT SERPL-CCNC: 22 U/L (ref 5–33)
ANION GAP SERPL CALCULATED.3IONS-SCNC: 15 MMOL/L (ref 9–17)
AST SERPL-CCNC: 25 U/L
BILIRUB SERPL-MCNC: 0.3 MG/DL (ref 0.3–1.2)
BUN SERPL-MCNC: 10 MG/DL (ref 8–23)
CALCIUM SERPL-MCNC: 9.9 MG/DL (ref 8.6–10.4)
CHLORIDE SERPL-SCNC: 108 MMOL/L (ref 98–107)
CHOLEST SERPL-MCNC: 256 MG/DL
CHOLESTEROL/HDL RATIO: 5.8
CO2 SERPL-SCNC: 24 MMOL/L (ref 20–31)
CREAT SERPL-MCNC: 0.81 MG/DL (ref 0.5–0.9)
GFR SERPL CREATININE-BSD FRML MDRD: >60 ML/MIN/1.73M2
GLUCOSE SERPL-MCNC: 84 MG/DL (ref 70–99)
HCT VFR BLD AUTO: 44.4 % (ref 36.3–47.1)
HDLC SERPL-MCNC: 44 MG/DL
HGB BLD-MCNC: 13.8 G/DL (ref 11.9–15.1)
LDLC SERPL CALC-MCNC: 180 MG/DL (ref 0–130)
MCH RBC QN AUTO: 30.3 PG (ref 25.2–33.5)
MCHC RBC AUTO-ENTMCNC: 31.1 G/DL (ref 28.4–34.8)
MCV RBC AUTO: 97.6 FL (ref 82.6–102.9)
NRBC AUTOMATED: 0 PER 100 WBC
PDW BLD-RTO: 13 % (ref 11.8–14.4)
PLATELET # BLD AUTO: 255 K/UL (ref 138–453)
PMV BLD AUTO: 11.2 FL (ref 8.1–13.5)
POTASSIUM SERPL-SCNC: 4.5 MMOL/L (ref 3.7–5.3)
PROT SERPL-MCNC: 7.3 G/DL (ref 6.4–8.3)
RBC # BLD: 4.55 M/UL (ref 3.95–5.11)
SODIUM SERPL-SCNC: 147 MMOL/L (ref 135–144)
TRIGL SERPL-MCNC: 158 MG/DL
TSH SERPL-ACNC: 1.05 UIU/ML (ref 0.3–5)
WBC # BLD AUTO: 8 K/UL (ref 3.5–11.3)

## 2023-03-21 ENCOUNTER — TELEPHONE (OUTPATIENT)
Dept: FAMILY MEDICINE CLINIC | Age: 68
End: 2023-03-21

## 2023-03-21 DIAGNOSIS — R20.8 BURNING SENSATION OF FEET: Primary | ICD-10-CM

## 2023-03-21 NOTE — TELEPHONE ENCOUNTER
Patient called in stating that she is requesting a referral podiatry    Patient has been having burning and tingling in both of her feet at the bottom          [phone]    Please advise

## 2023-04-18 ENCOUNTER — TELEPHONE (OUTPATIENT)
Dept: FAMILY MEDICINE CLINIC | Age: 68
End: 2023-04-18

## 2023-04-18 DIAGNOSIS — M81.0 OSTEOPOROSIS WITHOUT CURRENT PATHOLOGICAL FRACTURE, UNSPECIFIED OSTEOPOROSIS TYPE: ICD-10-CM

## 2023-04-18 NOTE — TELEPHONE ENCOUNTER
Patient called in stating she needs to get her Prolia medication refilled she stated her insurance company told her to contact the office     Please advise

## 2023-04-20 RX ORDER — DENOSUMAB 60 MG/ML
INJECTION SUBCUTANEOUS
Qty: 1 EACH | Refills: 2 | Status: SHIPPED | OUTPATIENT
Start: 2023-04-20

## 2023-04-28 ENCOUNTER — NURSE ONLY (OUTPATIENT)
Dept: FAMILY MEDICINE CLINIC | Age: 68
End: 2023-04-28

## 2023-04-28 VITALS
HEART RATE: 70 BPM | HEIGHT: 65 IN | WEIGHT: 150 LBS | BODY MASS INDEX: 24.99 KG/M2 | DIASTOLIC BLOOD PRESSURE: 73 MMHG | SYSTOLIC BLOOD PRESSURE: 122 MMHG

## 2023-04-28 DIAGNOSIS — M81.0 OSTEOPOROSIS WITHOUT CURRENT PATHOLOGICAL FRACTURE, UNSPECIFIED OSTEOPOROSIS TYPE: Primary | ICD-10-CM

## 2023-04-28 ASSESSMENT — PATIENT HEALTH QUESTIONNAIRE - PHQ9
SUM OF ALL RESPONSES TO PHQ QUESTIONS 1-9: 1
8. MOVING OR SPEAKING SO SLOWLY THAT OTHER PEOPLE COULD HAVE NOTICED. OR THE OPPOSITE, BEING SO FIGETY OR RESTLESS THAT YOU HAVE BEEN MOVING AROUND A LOT MORE THAN USUAL: 0
5. POOR APPETITE OR OVEREATING: 0
SUM OF ALL RESPONSES TO PHQ9 QUESTIONS 1 & 2: 0
4. FEELING TIRED OR HAVING LITTLE ENERGY: 1
9. THOUGHTS THAT YOU WOULD BE BETTER OFF DEAD, OR OF HURTING YOURSELF: 0
2. FEELING DOWN, DEPRESSED OR HOPELESS: 0
10. IF YOU CHECKED OFF ANY PROBLEMS, HOW DIFFICULT HAVE THESE PROBLEMS MADE IT FOR YOU TO DO YOUR WORK, TAKE CARE OF THINGS AT HOME, OR GET ALONG WITH OTHER PEOPLE: 0
3. TROUBLE FALLING OR STAYING ASLEEP: 0
7. TROUBLE CONCENTRATING ON THINGS, SUCH AS READING THE NEWSPAPER OR WATCHING TELEVISION: 0
1. LITTLE INTEREST OR PLEASURE IN DOING THINGS: 0
SUM OF ALL RESPONSES TO PHQ QUESTIONS 1-9: 1
SUM OF ALL RESPONSES TO PHQ QUESTIONS 1-9: 1
6. FEELING BAD ABOUT YOURSELF - OR THAT YOU ARE A FAILURE OR HAVE LET YOURSELF OR YOUR FAMILY DOWN: 0
SUM OF ALL RESPONSES TO PHQ QUESTIONS 1-9: 1

## 2023-05-09 LAB — NONINV COLON CA DNA+OCC BLD SCRN STL QL: POSITIVE

## 2023-05-10 DIAGNOSIS — Z12.11 SCREEN FOR COLON CANCER: ICD-10-CM

## 2023-05-10 DIAGNOSIS — R19.5 POSITIVE COLORECTAL CANCER SCREENING USING COLOGUARD TEST: Primary | ICD-10-CM

## 2023-05-23 ENCOUNTER — TELEPHONE (OUTPATIENT)
Dept: FAMILY MEDICINE CLINIC | Age: 68
End: 2023-05-23

## 2023-05-23 NOTE — TELEPHONE ENCOUNTER
Patient called in stating that she has a sinus infection and she is wanting to know if you could call her in some medication      Sx mucus, pressure in ear , sore throat , cough    No fever     Duration 3 days    Medication OTC medications        [facility]  Get online care: [employer]  Located in: My Digital Shield  Address: [address] [place] [address], [facility] [address]  Open ? Closes 9? PM  Phone: [phone]    Please advise

## 2023-06-27 DIAGNOSIS — Z76.0 MEDICATION REFILL: ICD-10-CM

## 2023-06-27 RX ORDER — PANTOPRAZOLE SODIUM 40 MG/1
40 TABLET, DELAYED RELEASE ORAL DAILY
Qty: 90 TABLET | Refills: 3 | Status: SHIPPED | OUTPATIENT
Start: 2023-06-27

## 2023-06-27 NOTE — TELEPHONE ENCOUNTER
Scott Baum is calling to request a refill on the following medication(s):    Last Visit Date (If Applicable):  6/85/1951    Next Visit Date:    8/14/2023    Medication Request:  Requested Prescriptions     Pending Prescriptions Disp Refills    pantoprazole (PROTONIX) 40 MG tablet [Pharmacy Med Name: Pantoprazole Sodium 40 MG Oral Tablet Delayed Release] 100 tablet 2     Sig: TAKE 1 TABLET BY MOUTH DAILY

## 2023-07-27 ENCOUNTER — TELEPHONE (OUTPATIENT)
Dept: FAMILY MEDICINE CLINIC | Age: 68
End: 2023-07-27

## 2023-07-27 DIAGNOSIS — R25.1 TREMOR: ICD-10-CM

## 2023-07-27 DIAGNOSIS — M62.838 NIGHT MUSCLE SPASMS: Primary | ICD-10-CM

## 2023-07-27 DIAGNOSIS — R20.8 BURNING SENSATION OF FEET: ICD-10-CM

## 2023-07-27 NOTE — TELEPHONE ENCOUNTER
Patient called in wanting to know if she can get a referral to a neurologist     prema More    Please advise

## 2023-07-28 NOTE — TELEPHONE ENCOUNTER
Order faxed to Dr. Ferny Izaguirre office. Left detailed message for patient to call and schedule appt.

## 2023-08-14 ENCOUNTER — TELEPHONE (OUTPATIENT)
Dept: FAMILY MEDICINE CLINIC | Age: 68
End: 2023-08-14

## 2023-08-14 NOTE — TELEPHONE ENCOUNTER
Patient notified. She isn't having the C/P of SOB, but will let us know if they develop. She will also call if BP is high.

## 2023-08-14 NOTE — TELEPHONE ENCOUNTER
Patient called in stating she is having the following sx she has not checked her blood pressure she does not have a BP cuff she is going to go to [facility] to check her BP and call us back      Sx swelling in legs and feet     Duration 1 week       Please advise

## 2023-08-16 NOTE — TELEPHONE ENCOUNTER
Called patient to come in on Friday 8/18/23 but pt states she is out of town until Monday next week. She is already scheduled for Tues 8/22/23 and will just keep that appt. She knows to go to ER if symptoms worsen before then.

## 2023-08-17 DIAGNOSIS — F33.1 MODERATE EPISODE OF RECURRENT MAJOR DEPRESSIVE DISORDER (HCC): ICD-10-CM

## 2023-08-18 RX ORDER — PAROXETINE 10 MG/1
TABLET, FILM COATED ORAL
Qty: 180 TABLET | Refills: 3 | Status: SHIPPED | OUTPATIENT
Start: 2023-08-18

## 2023-08-18 NOTE — TELEPHONE ENCOUNTER
Current Outpatient Medications on File Prior to Visit   Medication Sig Dispense Refill    pantoprazole (PROTONIX) 40 MG tablet TAKE 1 TABLET BY MOUTH DAILY 90 tablet 3    denosumab (PROLIA) 60 MG/ML SOSY SC injection TO BE ADMINISTERED IN PHYSICIAN'S OFFICE. INJECT ONE SYRINGE SUBCUTANEOUSLY ONCE EVERY 6 MONTHS. REFRIGERATE. USE WITHIN 14 DAYS ONCE AT ROOM TEMPERATURE. 1 each 2    gabapentin (NEURONTIN) 100 MG capsule Take 2 capsules by mouth nightly for 90 days. Intended supply: 30 days 180 capsule 1    PARoxetine (PAXIL) 10 MG tablet TAKE 2 TABLETS BY MOUTH EVERY  tablet 1    diclofenac sodium (VOLTAREN) 1 % GEL Apply 4 g topically 4 times daily 150 g 2    Actiphyte of [place] LIQD by Does not apply route      Lactobacillus (PROBIOTIC ACIDOPHILUS PO) Take by mouth      albuterol sulfate  (90 Base) MCG/ACT inhaler Inhale 2 puffs into the lungs every 6 hours as needed for Wheezing or Shortness of Breath 18 g 2    Handicap Placard MISC by Does not apply route Pt is unable to walk 200 ft without stopping to rest   Duration:  6 months 1 each 0    fluticasone (FLONASE) 50 MCG/ACT nasal spray SPRAY 1 SPRAY INTO EACH NOSTRIL EVERY DAY 1 Bottle 2    Multiple Vitamins-Minerals (THERAPEUTIC MULTIVITAMIN-MINERALS) tablet Take 1 tablet by mouth daily      vitamin B-12 (CYANOCOBALAMIN) 100 MCG tablet Take 0.5 tablets by mouth daily      Calcium Carb-Cholecalciferol 600-500 MG-UNIT CAPS Take by mouth      Flax Oil-Fish Oil-Borage Oil (FISH OIL-FLAX OIL-BORAGE OIL) CAPS Take by mouth        No current facility-administered medications on file prior to visit.

## 2023-08-21 SDOH — ECONOMIC STABILITY: INCOME INSECURITY: HOW HARD IS IT FOR YOU TO PAY FOR THE VERY BASICS LIKE FOOD, HOUSING, MEDICAL CARE, AND HEATING?: SOMEWHAT HARD

## 2023-08-21 SDOH — ECONOMIC STABILITY: FOOD INSECURITY: WITHIN THE PAST 12 MONTHS, THE FOOD YOU BOUGHT JUST DIDN'T LAST AND YOU DIDN'T HAVE MONEY TO GET MORE.: SOMETIMES TRUE

## 2023-08-21 SDOH — ECONOMIC STABILITY: FOOD INSECURITY: WITHIN THE PAST 12 MONTHS, YOU WORRIED THAT YOUR FOOD WOULD RUN OUT BEFORE YOU GOT MONEY TO BUY MORE.: SOMETIMES TRUE

## 2023-08-22 ENCOUNTER — OFFICE VISIT (OUTPATIENT)
Dept: FAMILY MEDICINE CLINIC | Age: 68
End: 2023-08-22
Payer: MEDICARE

## 2023-08-22 VITALS
BODY MASS INDEX: 25.49 KG/M2 | DIASTOLIC BLOOD PRESSURE: 79 MMHG | SYSTOLIC BLOOD PRESSURE: 141 MMHG | HEART RATE: 63 BPM | HEIGHT: 65 IN | WEIGHT: 153 LBS

## 2023-08-22 DIAGNOSIS — I10 PRIMARY HYPERTENSION: Primary | ICD-10-CM

## 2023-08-22 DIAGNOSIS — R73.03 PREDIABETES: ICD-10-CM

## 2023-08-22 DIAGNOSIS — Z59.41 FOOD INSECURITY: ICD-10-CM

## 2023-08-22 DIAGNOSIS — E78.2 MIXED HYPERLIPIDEMIA: ICD-10-CM

## 2023-08-22 LAB — HBA1C MFR BLD: 6 %

## 2023-08-22 PROCEDURE — G8427 DOCREV CUR MEDS BY ELIG CLIN: HCPCS | Performed by: NURSE PRACTITIONER

## 2023-08-22 PROCEDURE — 3078F DIAST BP <80 MM HG: CPT | Performed by: NURSE PRACTITIONER

## 2023-08-22 PROCEDURE — 1090F PRES/ABSN URINE INCON ASSESS: CPT | Performed by: NURSE PRACTITIONER

## 2023-08-22 PROCEDURE — G8419 CALC BMI OUT NRM PARAM NOF/U: HCPCS | Performed by: NURSE PRACTITIONER

## 2023-08-22 PROCEDURE — 3017F COLORECTAL CA SCREEN DOC REV: CPT | Performed by: NURSE PRACTITIONER

## 2023-08-22 PROCEDURE — 83037 HB GLYCOSYLATED A1C HOME DEV: CPT | Performed by: NURSE PRACTITIONER

## 2023-08-22 PROCEDURE — G8399 PT W/DXA RESULTS DOCUMENT: HCPCS | Performed by: NURSE PRACTITIONER

## 2023-08-22 PROCEDURE — 99214 OFFICE O/P EST MOD 30 MIN: CPT | Performed by: NURSE PRACTITIONER

## 2023-08-22 PROCEDURE — 1123F ACP DISCUSS/DSCN MKR DOCD: CPT | Performed by: NURSE PRACTITIONER

## 2023-08-22 PROCEDURE — 3077F SYST BP >= 140 MM HG: CPT | Performed by: NURSE PRACTITIONER

## 2023-08-22 PROCEDURE — 1036F TOBACCO NON-USER: CPT | Performed by: NURSE PRACTITIONER

## 2023-08-22 RX ORDER — ATORVASTATIN CALCIUM 20 MG/1
20 TABLET, FILM COATED ORAL DAILY
Qty: 90 TABLET | Refills: 1 | Status: SHIPPED | OUTPATIENT
Start: 2023-08-22

## 2023-08-22 RX ORDER — ATORVASTATIN CALCIUM 20 MG/1
1 TABLET, FILM COATED ORAL DAILY
COMMUNITY
Start: 2019-07-23 | End: 2023-08-22 | Stop reason: SDUPTHER

## 2023-08-22 RX ORDER — AMLODIPINE BESYLATE 5 MG/1
5 TABLET ORAL DAILY
Qty: 90 TABLET | Refills: 1 | Status: SHIPPED | OUTPATIENT
Start: 2023-08-22

## 2023-08-22 SDOH — ECONOMIC STABILITY - FOOD INSECURITY: FOOD INSECURITY: Z59.41

## 2023-08-22 ASSESSMENT — ENCOUNTER SYMPTOMS
SHORTNESS OF BREATH: 0
COLOR CHANGE: 0
ABDOMINAL PAIN: 0
CONSTIPATION: 0
DIARRHEA: 0
CHEST TIGHTNESS: 0

## 2023-08-22 ASSESSMENT — PATIENT HEALTH QUESTIONNAIRE - PHQ9
1. LITTLE INTEREST OR PLEASURE IN DOING THINGS: 0
SUM OF ALL RESPONSES TO PHQ QUESTIONS 1-9: 0
6. FEELING BAD ABOUT YOURSELF - OR THAT YOU ARE A FAILURE OR HAVE LET YOURSELF OR YOUR FAMILY DOWN: 0
8. MOVING OR SPEAKING SO SLOWLY THAT OTHER PEOPLE COULD HAVE NOTICED. OR THE OPPOSITE, BEING SO FIGETY OR RESTLESS THAT YOU HAVE BEEN MOVING AROUND A LOT MORE THAN USUAL: 0
10. IF YOU CHECKED OFF ANY PROBLEMS, HOW DIFFICULT HAVE THESE PROBLEMS MADE IT FOR YOU TO DO YOUR WORK, TAKE CARE OF THINGS AT HOME, OR GET ALONG WITH OTHER PEOPLE: 0
9. THOUGHTS THAT YOU WOULD BE BETTER OFF DEAD, OR OF HURTING YOURSELF: 0
7. TROUBLE CONCENTRATING ON THINGS, SUCH AS READING THE NEWSPAPER OR WATCHING TELEVISION: 0
SUM OF ALL RESPONSES TO PHQ QUESTIONS 1-9: 0
4. FEELING TIRED OR HAVING LITTLE ENERGY: 0
2. FEELING DOWN, DEPRESSED OR HOPELESS: 0
SUM OF ALL RESPONSES TO PHQ QUESTIONS 1-9: 0
5. POOR APPETITE OR OVEREATING: 0
3. TROUBLE FALLING OR STAYING ASLEEP: 0
SUM OF ALL RESPONSES TO PHQ QUESTIONS 1-9: 0
SUM OF ALL RESPONSES TO PHQ9 QUESTIONS 1 & 2: 0

## 2023-08-22 NOTE — PATIENT INSTRUCTIONS
Complete Care at [facility] [facility] 34  [address] 148 [facility], [address] [place] [address]   [phone]    Neurology - Dr Isma White  [address] [facility], [address] [place] [address],[address]  [phone]    GI - Dr Col[facility] [address] [facility] 401 [facility], [address] [place] [address] & [facility]   [phone]

## 2023-09-13 ENCOUNTER — NURSE ONLY (OUTPATIENT)
Dept: FAMILY MEDICINE CLINIC | Age: 68
End: 2023-09-13
Payer: MEDICARE

## 2023-09-13 VITALS
HEIGHT: 65 IN | TEMPERATURE: 97.6 F | DIASTOLIC BLOOD PRESSURE: 64 MMHG | SYSTOLIC BLOOD PRESSURE: 110 MMHG | HEART RATE: 62 BPM | OXYGEN SATURATION: 95 % | WEIGHT: 153 LBS | BODY MASS INDEX: 25.49 KG/M2

## 2023-09-13 DIAGNOSIS — I10 PRIMARY HYPERTENSION: Primary | ICD-10-CM

## 2023-09-13 PROCEDURE — 99211 OFF/OP EST MAY X REQ PHY/QHP: CPT | Performed by: NURSE PRACTITIONER

## 2023-10-03 ENCOUNTER — TELEPHONE (OUTPATIENT)
Dept: FAMILY MEDICINE CLINIC | Age: 68
End: 2023-10-03

## 2023-10-03 NOTE — TELEPHONE ENCOUNTER
Mindy Byrdff was contacted by a Karen Quiles to discuss a referral for SDOH related needs. Writer spoke with: Patient and explained the services and assistance that can be provided through the Karen Quiles program.     Patient agreeable to receiving resources and support from Yuriy. Intake Notes: Spoke with pt about supplemental food resources. Plan of Care: N/A    Action steps to be completed by writer: Will send list of supplemental food resources to pt.     Action steps to be completed by patient: none    Patient has given verbal permission to leave detailed messages regarding SDOH referral on their phone: N/A    Patient has given verbal permission to submit applications on their behalf: N/A    Patient voiced understanding of action plan and responsibilities, was provided with writer's contact information, and agrees to call should they require additional assistance: yes      Donny Castillo MA

## 2023-10-19 NOTE — TELEPHONE ENCOUNTER
Meseret Degroot was contacted by Milton Farris MA, a Karen Quiles, regarding a Social Determinants of Health referral.     A message was left with the writer's contact information. Second follow-up attempt.

## 2023-10-19 NOTE — TELEPHONE ENCOUNTER
Ziggy Jones was contacted by yarelis Quiles for follow-up regarding SDOH related needs. Progress Notes: Pt has not returned phone calls about whether she received resources sent by mail or whether she would like further assistance from a CHW.     Action steps to be completed by writer: Close referral.    Action steps to be completed by patient: none    Patient has given verbal permission to leave detailed messages regarding SDOH referral on their phone: N/A    Patient has given verbal permission to submit applications on their behalf: N/A    Stone Zuluaga MA

## 2023-10-19 NOTE — TELEPHONE ENCOUNTER
Lynda Friend was contacted by Rach Cheng MA, a Karen Quiles, regarding a Social Determinants of Health referral.     A message was left with the writer's contact information. Follow-up attempt.

## 2023-10-24 DIAGNOSIS — M81.0 OSTEOPOROSIS WITHOUT CURRENT PATHOLOGICAL FRACTURE, UNSPECIFIED OSTEOPOROSIS TYPE: ICD-10-CM

## 2023-10-24 RX ORDER — DENOSUMAB 60 MG/ML
INJECTION SUBCUTANEOUS
Qty: 1 EACH | Refills: 2 | Status: SHIPPED | OUTPATIENT
Start: 2023-10-24

## 2023-10-30 DIAGNOSIS — I10 PRIMARY HYPERTENSION: ICD-10-CM

## 2023-10-30 DIAGNOSIS — E78.2 MIXED HYPERLIPIDEMIA: ICD-10-CM

## 2023-10-30 RX ORDER — AMLODIPINE BESYLATE 5 MG/1
5 TABLET ORAL DAILY
Qty: 100 TABLET | Refills: 2 | Status: SHIPPED | OUTPATIENT
Start: 2023-10-30

## 2023-10-30 RX ORDER — ATORVASTATIN CALCIUM 20 MG/1
20 TABLET, FILM COATED ORAL DAILY
Qty: 100 TABLET | Refills: 2 | Status: SHIPPED | OUTPATIENT
Start: 2023-10-30

## 2023-10-30 NOTE — TELEPHONE ENCOUNTER
Sanna Olvera is calling to request a refill on the following medication(s):    Last Visit Date (If Applicable):  9/42/9949    Next Visit Date:    Visit date not found    Medication Request:  Requested Prescriptions     Pending Prescriptions Disp Refills    amLODIPine (NORVASC) 5 MG tablet [Pharmacy Med Name: amLODIPine Besylate 5 MG Oral Tablet] 100 tablet 2     Sig: TAKE 1 TABLET BY MOUTH DAILY    atorvastatin (LIPITOR) 20 MG tablet [Pharmacy Med Name: Atorvastatin Calcium 20 MG Oral Tablet] 100 tablet 2     Sig: TAKE 1 TABLET BY MOUTH ONCE  DAILY

## 2023-10-31 ENCOUNTER — OFFICE VISIT (OUTPATIENT)
Dept: FAMILY MEDICINE CLINIC | Age: 68
End: 2023-10-31
Payer: MEDICARE

## 2023-10-31 DIAGNOSIS — M81.0 OSTEOPOROSIS WITHOUT CURRENT PATHOLOGICAL FRACTURE, UNSPECIFIED OSTEOPOROSIS TYPE: Primary | ICD-10-CM

## 2023-10-31 PROCEDURE — 4004F PT TOBACCO SCREEN RCVD TLK: CPT | Performed by: NURSE PRACTITIONER

## 2023-10-31 PROCEDURE — G8428 CUR MEDS NOT DOCUMENT: HCPCS | Performed by: NURSE PRACTITIONER

## 2023-10-31 PROCEDURE — G8419 CALC BMI OUT NRM PARAM NOF/U: HCPCS | Performed by: NURSE PRACTITIONER

## 2023-10-31 PROCEDURE — G8399 PT W/DXA RESULTS DOCUMENT: HCPCS | Performed by: NURSE PRACTITIONER

## 2023-10-31 PROCEDURE — 3017F COLORECTAL CA SCREEN DOC REV: CPT | Performed by: NURSE PRACTITIONER

## 2023-10-31 PROCEDURE — 96372 THER/PROPH/DIAG INJ SC/IM: CPT | Performed by: NURSE PRACTITIONER

## 2023-10-31 PROCEDURE — 1123F ACP DISCUSS/DSCN MKR DOCD: CPT | Performed by: NURSE PRACTITIONER

## 2023-10-31 PROCEDURE — 1090F PRES/ABSN URINE INCON ASSESS: CPT | Performed by: NURSE PRACTITIONER

## 2023-10-31 PROCEDURE — G8484 FLU IMMUNIZE NO ADMIN: HCPCS | Performed by: NURSE PRACTITIONER

## 2023-10-31 PROCEDURE — 99212 OFFICE O/P EST SF 10 MIN: CPT | Performed by: NURSE PRACTITIONER

## 2023-10-31 NOTE — PROGRESS NOTES
Prolia injection given in SQ left upper arm by RN/Student GEM Gonzalez with supervision. Patient tolerated well. Questions answered. Appointment scheduled next week for further discussion regarding current medications.

## 2024-01-25 ENCOUNTER — HOSPITAL ENCOUNTER (OUTPATIENT)
Age: 69
Setting detail: SPECIMEN
Discharge: HOME | End: 2024-01-25

## 2024-01-25 ENCOUNTER — OFFICE VISIT (OUTPATIENT)
Dept: FAMILY MEDICINE CLINIC | Age: 69
End: 2024-01-25
Payer: MEDICARE

## 2024-01-25 VITALS
BODY MASS INDEX: 26.93 KG/M2 | HEIGHT: 63 IN | WEIGHT: 152 LBS | OXYGEN SATURATION: 96 % | DIASTOLIC BLOOD PRESSURE: 81 MMHG | SYSTOLIC BLOOD PRESSURE: 132 MMHG | HEART RATE: 70 BPM

## 2024-01-25 DIAGNOSIS — R30.0 DYSURIA: ICD-10-CM

## 2024-01-25 DIAGNOSIS — L30.9 DERMATITIS: ICD-10-CM

## 2024-01-25 DIAGNOSIS — R73.03 PREDIABETES: ICD-10-CM

## 2024-01-25 DIAGNOSIS — E78.2 MIXED HYPERLIPIDEMIA: ICD-10-CM

## 2024-01-25 DIAGNOSIS — R30.0 DYSURIA: Primary | ICD-10-CM

## 2024-01-25 DIAGNOSIS — Z12.31 SCREENING MAMMOGRAM FOR BREAST CANCER: ICD-10-CM

## 2024-01-25 DIAGNOSIS — I10 PRIMARY HYPERTENSION: ICD-10-CM

## 2024-01-25 DIAGNOSIS — E55.9 VITAMIN D DEFICIENCY: ICD-10-CM

## 2024-01-25 DIAGNOSIS — Z78.0 POST-MENOPAUSAL: ICD-10-CM

## 2024-01-25 LAB
BILIRUBIN, POC: NORMAL
BLOOD URINE, POC: NORMAL
CLARITY, POC: CLEAR
COLOR, POC: YELLOW
GLUCOSE URINE, POC: NORMAL
KETONES, POC: NORMAL
LEUKOCYTE EST, POC: NORMAL
NITRITE, POC: NORMAL
PH, POC: 5.5
PROTEIN, POC: NORMAL
SPECIFIC GRAVITY, POC: >=1.03
UROBILINOGEN, POC: 0.2

## 2024-01-25 PROCEDURE — 3075F SYST BP GE 130 - 139MM HG: CPT | Performed by: FAMILY MEDICINE

## 2024-01-25 PROCEDURE — G8427 DOCREV CUR MEDS BY ELIG CLIN: HCPCS | Performed by: FAMILY MEDICINE

## 2024-01-25 PROCEDURE — 1123F ACP DISCUSS/DSCN MKR DOCD: CPT | Performed by: FAMILY MEDICINE

## 2024-01-25 PROCEDURE — 3079F DIAST BP 80-89 MM HG: CPT | Performed by: FAMILY MEDICINE

## 2024-01-25 PROCEDURE — G8484 FLU IMMUNIZE NO ADMIN: HCPCS | Performed by: FAMILY MEDICINE

## 2024-01-25 PROCEDURE — 4004F PT TOBACCO SCREEN RCVD TLK: CPT | Performed by: FAMILY MEDICINE

## 2024-01-25 PROCEDURE — G8399 PT W/DXA RESULTS DOCUMENT: HCPCS | Performed by: FAMILY MEDICINE

## 2024-01-25 PROCEDURE — 81003 URINALYSIS AUTO W/O SCOPE: CPT | Performed by: FAMILY MEDICINE

## 2024-01-25 PROCEDURE — 3017F COLORECTAL CA SCREEN DOC REV: CPT | Performed by: FAMILY MEDICINE

## 2024-01-25 PROCEDURE — 99214 OFFICE O/P EST MOD 30 MIN: CPT | Performed by: FAMILY MEDICINE

## 2024-01-25 PROCEDURE — 1090F PRES/ABSN URINE INCON ASSESS: CPT | Performed by: FAMILY MEDICINE

## 2024-01-25 PROCEDURE — G8419 CALC BMI OUT NRM PARAM NOF/U: HCPCS | Performed by: FAMILY MEDICINE

## 2024-01-25 RX ORDER — TRIAMCINOLONE ACETONIDE 1 MG/G
CREAM TOPICAL
Qty: 80 G | Refills: 1 | Status: SHIPPED | OUTPATIENT
Start: 2024-01-25

## 2024-01-25 NOTE — PROGRESS NOTES
topically 4 times daily 150 g 2    Lactobacillus (PROBIOTIC ACIDOPHILUS PO) Take by mouth      Handicap Placard MISC by Does not apply route Pt is unable to walk 200 ft without stopping to rest   Duration:  6 months 1 each 0    Multiple Vitamins-Minerals (THERAPEUTIC MULTIVITAMIN-MINERALS) tablet Take 1 tablet by mouth daily      albuterol sulfate  (90 Base) MCG/ACT inhaler Inhale 2 puffs into the lungs every 6 hours as needed for Wheezing or Shortness of Breath 18 g 2     No current facility-administered medications for this visit.     ALLERGIES:    Allergies   Allergen Reactions    Codeine     Hydrocodone     Tetracycline     Vicodin [Hydrocodone-Acetaminophen]        Social History     Tobacco Use    Smoking status: Light Smoker     Current packs/day: 0.00     Average packs/day: 0.3 packs/day for 40.0 years (10.0 ttl pk-yrs)     Types: Cigarettes     Start date: 1982     Last attempt to quit: 2021     Years since quittin.1    Smokeless tobacco: Never    Tobacco comments:     Quit in  and restarted 2022   Substance Use Topics    Alcohol use: Not Currently        LDL Cholesterol (mg/dL)   Date Value   2023 180 (H)     HDL (mg/dL)   Date Value   2023 44     BUN (mg/dL)   Date Value   2023 10     Creatinine (mg/dL)   Date Value   2023 0.81     Glucose (mg/dL)   Date Value   2021 103 (H)     Hemoglobin A1C (%)   Date Value   2023 6.0              Subjective:      HPI  She is being seen today because she states she developed some dysuria she was also concerned because in the fall she was out of town went to some urgent care was treated for UTI but did not get better so they gave her another antibiotic when she came back and then shortly thereafter that she developed a rash this was in November and she was concerned that the rash that she developed on her arms and on her lower back was from something with a UTI her UTI symptoms resolved shortly after that

## 2024-01-26 LAB
MICROORGANISM SPEC CULT: NORMAL
SPECIMEN DESCRIPTION: NORMAL

## 2024-01-29 ENCOUNTER — HOSPITAL ENCOUNTER (OUTPATIENT)
Facility: CLINIC | Age: 69
Discharge: HOME | End: 2024-01-29
Payer: MEDICARE

## 2024-01-29 DIAGNOSIS — R30.0 DYSURIA: ICD-10-CM

## 2024-01-29 DIAGNOSIS — E55.9 VITAMIN D DEFICIENCY: ICD-10-CM

## 2024-01-29 DIAGNOSIS — E78.2 MIXED HYPERLIPIDEMIA: ICD-10-CM

## 2024-01-29 DIAGNOSIS — L30.9 DERMATITIS: ICD-10-CM

## 2024-01-29 DIAGNOSIS — R73.03 PREDIABETES: ICD-10-CM

## 2024-01-29 DIAGNOSIS — I10 PRIMARY HYPERTENSION: ICD-10-CM

## 2024-01-29 LAB
25(OH)D3 SERPL-MCNC: 101 NG/ML (ref 30–100)
ALBUMIN SERPL-MCNC: 4.6 G/DL (ref 3.5–5.2)
ALBUMIN/GLOB SERPL: 2 {RATIO} (ref 1–2.5)
ALP SERPL-CCNC: 49 U/L (ref 35–104)
ALT SERPL-CCNC: 44 U/L (ref 10–35)
ANION GAP SERPL CALCULATED.3IONS-SCNC: 10 MMOL/L (ref 9–16)
AST SERPL-CCNC: 34 U/L (ref 10–35)
BASOPHILS # BLD: 0.05 K/UL (ref 0–0.2)
BASOPHILS NFR BLD: 1 % (ref 0–2)
BILIRUB SERPL-MCNC: 0.4 MG/DL (ref 0–1.2)
BUN SERPL-MCNC: 10 MG/DL (ref 8–23)
CALCIUM SERPL-MCNC: 9.4 MG/DL (ref 8.6–10.4)
CHLORIDE SERPL-SCNC: 106 MMOL/L (ref 98–107)
CHOLEST SERPL-MCNC: 263 MG/DL (ref 0–199)
CHOLESTEROL/HDL RATIO: 8
CO2 SERPL-SCNC: 25 MMOL/L (ref 20–31)
CREAT SERPL-MCNC: 0.9 MG/DL (ref 0.5–0.9)
EOSINOPHIL # BLD: 0.2 K/UL (ref 0–0.44)
EOSINOPHILS RELATIVE PERCENT: 4 % (ref 1–4)
ERYTHROCYTE [DISTWIDTH] IN BLOOD BY AUTOMATED COUNT: 12.8 % (ref 11.8–14.4)
EST. AVERAGE GLUCOSE BLD GHB EST-MCNC: 108 MG/DL
GFR SERPL CREATININE-BSD FRML MDRD: >60 ML/MIN/1.73M2
GLUCOSE SERPL-MCNC: 104 MG/DL (ref 74–99)
HBA1C MFR BLD: 5.4 % (ref 4–6)
HCT VFR BLD AUTO: 41.4 % (ref 36.3–47.1)
HDLC SERPL-MCNC: 33 MG/DL
HGB BLD-MCNC: 13.4 G/DL (ref 11.9–15.1)
IMM GRANULOCYTES # BLD AUTO: <0.03 K/UL (ref 0–0.3)
IMM GRANULOCYTES NFR BLD: 0 %
LDLC SERPL CALC-MCNC: 183 MG/DL (ref 0–100)
LYMPHOCYTES NFR BLD: 2.56 K/UL (ref 1.1–3.7)
LYMPHOCYTES RELATIVE PERCENT: 46 % (ref 24–43)
MCH RBC QN AUTO: 30.9 PG (ref 25.2–33.5)
MCHC RBC AUTO-ENTMCNC: 32.4 G/DL (ref 28.4–34.8)
MCV RBC AUTO: 95.4 FL (ref 82.6–102.9)
MONOCYTES NFR BLD: 0.39 K/UL (ref 0.1–1.2)
MONOCYTES NFR BLD: 7 % (ref 3–12)
NEUTROPHILS NFR BLD: 42 % (ref 36–65)
NEUTS SEG NFR BLD: 2.31 K/UL (ref 1.5–8.1)
NRBC BLD-RTO: 0 PER 100 WBC
PLATELET # BLD AUTO: 226 K/UL (ref 138–453)
PMV BLD AUTO: 11.5 FL (ref 8.1–13.5)
POTASSIUM SERPL-SCNC: 4.1 MMOL/L (ref 3.7–5.3)
PROT SERPL-MCNC: 6.9 G/DL (ref 6.6–8.7)
RBC # BLD AUTO: 4.34 M/UL (ref 3.95–5.11)
SODIUM SERPL-SCNC: 141 MMOL/L (ref 136–145)
T4 FREE SERPL-MCNC: 1 NG/DL (ref 0.93–1.7)
TRIGL SERPL-MCNC: 234 MG/DL
TSH SERPL DL<=0.05 MIU/L-ACNC: 0.12 UIU/ML (ref 0.27–4.2)
VLDLC SERPL CALC-MCNC: 47 MG/DL
WBC OTHER # BLD: 5.5 K/UL (ref 3.5–11.3)

## 2024-01-29 PROCEDURE — 83036 HEMOGLOBIN GLYCOSYLATED A1C: CPT

## 2024-01-29 PROCEDURE — 85025 COMPLETE CBC W/AUTO DIFF WBC: CPT

## 2024-01-29 PROCEDURE — 84439 ASSAY OF FREE THYROXINE: CPT

## 2024-01-29 PROCEDURE — 36415 COLL VENOUS BLD VENIPUNCTURE: CPT

## 2024-01-29 PROCEDURE — 80053 COMPREHEN METABOLIC PANEL: CPT

## 2024-01-29 PROCEDURE — 84443 ASSAY THYROID STIM HORMONE: CPT

## 2024-01-29 PROCEDURE — 82306 VITAMIN D 25 HYDROXY: CPT

## 2024-01-29 PROCEDURE — 80061 LIPID PANEL: CPT

## 2024-02-07 DIAGNOSIS — E78.2 MIXED HYPERLIPIDEMIA: Primary | ICD-10-CM

## 2024-02-07 DIAGNOSIS — E55.9 VITAMIN D DEFICIENCY: ICD-10-CM

## 2024-02-07 RX ORDER — ROSUVASTATIN CALCIUM 40 MG/1
40 TABLET, COATED ORAL DAILY
Qty: 90 TABLET | Refills: 0 | Status: SHIPPED | OUTPATIENT
Start: 2024-02-07

## 2024-03-07 ENCOUNTER — HOSPITAL ENCOUNTER (OUTPATIENT)
Dept: MAMMOGRAPHY | Age: 69
Discharge: HOME | End: 2024-03-09
Attending: FAMILY MEDICINE
Payer: MEDICARE

## 2024-03-07 DIAGNOSIS — Z78.0 POST-MENOPAUSAL: ICD-10-CM

## 2024-03-07 DIAGNOSIS — Z12.31 SCREENING MAMMOGRAM FOR BREAST CANCER: ICD-10-CM

## 2024-03-07 PROCEDURE — 77063 BREAST TOMOSYNTHESIS BI: CPT

## 2024-03-07 PROCEDURE — 77080 DXA BONE DENSITY AXIAL: CPT

## 2024-04-26 ENCOUNTER — TELEPHONE (OUTPATIENT)
Dept: FAMILY MEDICINE CLINIC | Age: 69
End: 2024-04-26

## 2024-04-26 NOTE — TELEPHONE ENCOUNTER
Patient has appointment 4/30/24 for AWV and prolia injection, spoke with [facility] and they told her prolia was delivered to our office in Feb, she spoke with someone in the office and they told her they were going to reorder it and it would be here by May 6th. She wants to know if it's here?

## 2024-04-27 SDOH — HEALTH STABILITY: PHYSICAL HEALTH: ON AVERAGE, HOW MANY MINUTES DO YOU ENGAGE IN EXERCISE AT THIS LEVEL?: 20 MIN

## 2024-04-27 SDOH — HEALTH STABILITY: PHYSICAL HEALTH: ON AVERAGE, HOW MANY DAYS PER WEEK DO YOU ENGAGE IN MODERATE TO STRENUOUS EXERCISE (LIKE A BRISK WALK)?: 3 DAYS

## 2024-04-27 ASSESSMENT — LIFESTYLE VARIABLES
HOW MANY STANDARD DRINKS CONTAINING ALCOHOL DO YOU HAVE ON A TYPICAL DAY: 3 OR 4
HAVE YOU OR SOMEONE ELSE BEEN INJURED AS A RESULT OF YOUR DRINKING: NO
HOW OFTEN DURING THE LAST YEAR HAVE YOU HAD A FEELING OF GUILT OR REMORSE AFTER DRINKING: NEVER
HOW OFTEN DO YOU HAVE A DRINK CONTAINING ALCOHOL: 2-4 TIMES A MONTH
HAS A RELATIVE, FRIEND, DOCTOR, OR ANOTHER HEALTH PROFESSIONAL EXPRESSED CONCERN ABOUT YOUR DRINKING OR SUGGESTED YOU CUT DOWN: NO
HOW OFTEN DURING THE LAST YEAR HAVE YOU NEEDED AN ALCOHOLIC DRINK FIRST THING IN THE MORNING TO GET YOURSELF GOING AFTER A NIGHT OF HEAVY DRINKING: NEVER
HOW OFTEN DURING THE LAST YEAR HAVE YOU BEEN UNABLE TO REMEMBER WHAT HAPPENED THE NIGHT BEFORE BECAUSE YOU HAD BEEN DRINKING: NEVER
HOW OFTEN DURING THE LAST YEAR HAVE YOU FAILED TO DO WHAT WAS NORMALLY EXPECTED FROM YOU BECAUSE OF DRINKING: NEVER
HOW OFTEN DURING THE LAST YEAR HAVE YOU FOUND THAT YOU WERE NOT ABLE TO STOP DRINKING ONCE YOU HAD STARTED: NEVER

## 2024-04-27 ASSESSMENT — PATIENT HEALTH QUESTIONNAIRE - PHQ9
1. LITTLE INTEREST OR PLEASURE IN DOING THINGS: SEVERAL DAYS
SUM OF ALL RESPONSES TO PHQ QUESTIONS 1-9: 2
SUM OF ALL RESPONSES TO PHQ9 QUESTIONS 1 & 2: 2
SUM OF ALL RESPONSES TO PHQ QUESTIONS 1-9: 2
2. FEELING DOWN, DEPRESSED OR HOPELESS: SEVERAL DAYS

## 2024-04-29 NOTE — TELEPHONE ENCOUNTER
We do not have her Prolia. Pt sent a [employer] message stating she ordered it too late and that it won't be available until 5/7 or 5/8/24.

## 2024-05-10 SDOH — HEALTH STABILITY: PHYSICAL HEALTH: ON AVERAGE, HOW MANY MINUTES DO YOU ENGAGE IN EXERCISE AT THIS LEVEL?: 20 MIN

## 2024-05-10 SDOH — HEALTH STABILITY: PHYSICAL HEALTH: ON AVERAGE, HOW MANY DAYS PER WEEK DO YOU ENGAGE IN MODERATE TO STRENUOUS EXERCISE (LIKE A BRISK WALK)?: 3 DAYS

## 2024-05-10 ASSESSMENT — LIFESTYLE VARIABLES
HAVE YOU OR SOMEONE ELSE BEEN INJURED AS A RESULT OF YOUR DRINKING: NO
HOW OFTEN DURING THE LAST YEAR HAVE YOU HAD A FEELING OF GUILT OR REMORSE AFTER DRINKING: NEVER
HOW MANY STANDARD DRINKS CONTAINING ALCOHOL DO YOU HAVE ON A TYPICAL DAY: 2
HAS A RELATIVE, FRIEND, DOCTOR, OR ANOTHER HEALTH PROFESSIONAL EXPRESSED CONCERN ABOUT YOUR DRINKING OR SUGGESTED YOU CUT DOWN: NO
HOW OFTEN DO YOU HAVE SIX OR MORE DRINKS ON ONE OCCASION: 1
HOW OFTEN DURING THE LAST YEAR HAVE YOU HAD A FEELING OF GUILT OR REMORSE AFTER DRINKING: NEVER
HOW OFTEN DURING THE LAST YEAR HAVE YOU BEEN UNABLE TO REMEMBER WHAT HAPPENED THE NIGHT BEFORE BECAUSE YOU HAD BEEN DRINKING: NEVER
HOW OFTEN DURING THE LAST YEAR HAVE YOU NEEDED AN ALCOHOLIC DRINK FIRST THING IN THE MORNING TO GET YOURSELF GOING AFTER A NIGHT OF HEAVY DRINKING: NEVER
HOW OFTEN DO YOU HAVE A DRINK CONTAINING ALCOHOL: 2-4 TIMES A MONTH
HOW OFTEN DO YOU HAVE A DRINK CONTAINING ALCOHOL: 3
HOW MANY STANDARD DRINKS CONTAINING ALCOHOL DO YOU HAVE ON A TYPICAL DAY: 3 OR 4
HAVE YOU OR SOMEONE ELSE BEEN INJURED AS A RESULT OF YOUR DRINKING: NO
HOW OFTEN DURING THE LAST YEAR HAVE YOU FAILED TO DO WHAT WAS NORMALLY EXPECTED FROM YOU BECAUSE OF DRINKING: NEVER
HOW OFTEN DURING THE LAST YEAR HAVE YOU NEEDED AN ALCOHOLIC DRINK FIRST THING IN THE MORNING TO GET YOURSELF GOING AFTER A NIGHT OF HEAVY DRINKING: NEVER
HOW OFTEN DURING THE LAST YEAR HAVE YOU FOUND THAT YOU WERE NOT ABLE TO STOP DRINKING ONCE YOU HAD STARTED: NEVER
HAS A RELATIVE, FRIEND, DOCTOR, OR ANOTHER HEALTH PROFESSIONAL EXPRESSED CONCERN ABOUT YOUR DRINKING OR SUGGESTED YOU CUT DOWN: NO
HOW OFTEN DURING THE LAST YEAR HAVE YOU FAILED TO DO WHAT WAS NORMALLY EXPECTED FROM YOU BECAUSE OF DRINKING: NEVER
HOW OFTEN DURING THE LAST YEAR HAVE YOU FOUND THAT YOU WERE NOT ABLE TO STOP DRINKING ONCE YOU HAD STARTED: NEVER
HOW OFTEN DURING THE LAST YEAR HAVE YOU BEEN UNABLE TO REMEMBER WHAT HAPPENED THE NIGHT BEFORE BECAUSE YOU HAD BEEN DRINKING: NEVER

## 2024-05-10 ASSESSMENT — PATIENT HEALTH QUESTIONNAIRE - PHQ9
SUM OF ALL RESPONSES TO PHQ QUESTIONS 1-9: 8
2. FEELING DOWN, DEPRESSED OR HOPELESS: SEVERAL DAYS
8. MOVING OR SPEAKING SO SLOWLY THAT OTHER PEOPLE COULD HAVE NOTICED. OR THE OPPOSITE, BEING SO FIGETY OR RESTLESS THAT YOU HAVE BEEN MOVING AROUND A LOT MORE THAN USUAL: NOT AT ALL
6. FEELING BAD ABOUT YOURSELF - OR THAT YOU ARE A FAILURE OR HAVE LET YOURSELF OR YOUR FAMILY DOWN: NOT AT ALL
9. THOUGHTS THAT YOU WOULD BE BETTER OFF DEAD, OR OF HURTING YOURSELF: NOT AT ALL
4. FEELING TIRED OR HAVING LITTLE ENERGY: NEARLY EVERY DAY
3. TROUBLE FALLING OR STAYING ASLEEP: SEVERAL DAYS
5. POOR APPETITE OR OVEREATING: NOT AT ALL
10. IF YOU CHECKED OFF ANY PROBLEMS, HOW DIFFICULT HAVE THESE PROBLEMS MADE IT FOR YOU TO DO YOUR WORK, TAKE CARE OF THINGS AT HOME, OR GET ALONG WITH OTHER PEOPLE: NOT DIFFICULT AT ALL
SUM OF ALL RESPONSES TO PHQ9 QUESTIONS 1 & 2: 2
SUM OF ALL RESPONSES TO PHQ QUESTIONS 1-9: 8
7. TROUBLE CONCENTRATING ON THINGS, SUCH AS READING THE NEWSPAPER OR WATCHING TELEVISION: MORE THAN HALF THE DAYS
1. LITTLE INTEREST OR PLEASURE IN DOING THINGS: SEVERAL DAYS

## 2024-05-13 ENCOUNTER — HOSPITAL ENCOUNTER (OUTPATIENT)
Age: 69
Setting detail: SPECIMEN
Discharge: HOME | End: 2024-05-13

## 2024-05-13 ENCOUNTER — OFFICE VISIT (OUTPATIENT)
Dept: FAMILY MEDICINE CLINIC | Age: 69
End: 2024-05-13
Payer: MEDICARE

## 2024-05-13 VITALS
DIASTOLIC BLOOD PRESSURE: 78 MMHG | SYSTOLIC BLOOD PRESSURE: 135 MMHG | WEIGHT: 146 LBS | HEIGHT: 63 IN | HEART RATE: 80 BPM | BODY MASS INDEX: 25.87 KG/M2

## 2024-05-13 DIAGNOSIS — R30.0 DYSURIA: ICD-10-CM

## 2024-05-13 DIAGNOSIS — E55.9 VITAMIN D DEFICIENCY: ICD-10-CM

## 2024-05-13 DIAGNOSIS — Z00.00 MEDICARE ANNUAL WELLNESS VISIT, SUBSEQUENT: Primary | ICD-10-CM

## 2024-05-13 DIAGNOSIS — N89.8 VAGINAL IRRITATION: ICD-10-CM

## 2024-05-13 DIAGNOSIS — N30.90 CYSTITIS: ICD-10-CM

## 2024-05-13 DIAGNOSIS — E78.2 MIXED HYPERLIPIDEMIA: ICD-10-CM

## 2024-05-13 DIAGNOSIS — M81.0 OSTEOPOROSIS WITHOUT CURRENT PATHOLOGICAL FRACTURE, UNSPECIFIED OSTEOPOROSIS TYPE: ICD-10-CM

## 2024-05-13 LAB
BACTERIA URNS QL MICRO: ABNORMAL
BILIRUB UR QL STRIP: NEGATIVE
BILIRUBIN, POC: ABNORMAL
BLOOD URINE, POC: NEGATIVE
CASTS #/AREA URNS LPF: ABNORMAL /LPF (ref 0–8)
CLARITY UR: CLEAR
CLARITY, POC: CLEAR
COLOR UR: ABNORMAL
COLOR, POC: YELLOW
EPI CELLS #/AREA URNS HPF: ABNORMAL /HPF (ref 0–5)
GLUCOSE UR STRIP-MCNC: NEGATIVE MG/DL
GLUCOSE URINE, POC: NEGATIVE
HGB UR QL STRIP.AUTO: NEGATIVE
KETONES UR STRIP-MCNC: ABNORMAL MG/DL
KETONES, POC: ABNORMAL
LEUKOCYTE EST, POC: ABNORMAL
LEUKOCYTE ESTERASE UR QL STRIP: ABNORMAL
NITRITE UR QL STRIP: NEGATIVE
NITRITE, POC: NEGATIVE
PH UR STRIP: 5.5 [PH] (ref 5–8)
PH, POC: 5.5
PROT UR STRIP-MCNC: ABNORMAL MG/DL
PROTEIN, POC: ABNORMAL
RBC #/AREA URNS HPF: ABNORMAL /HPF (ref 0–4)
SP GR UR STRIP: 1.03 (ref 1–1.03)
SPECIFIC GRAVITY, POC: 1.02
UROBILINOGEN UR STRIP-ACNC: NORMAL EU/DL (ref 0–1)
UROBILINOGEN, POC: 0.2
WBC #/AREA URNS HPF: ABNORMAL /HPF (ref 0–5)

## 2024-05-13 PROCEDURE — 81003 URINALYSIS AUTO W/O SCOPE: CPT | Performed by: NURSE PRACTITIONER

## 2024-05-13 PROCEDURE — 1090F PRES/ABSN URINE INCON ASSESS: CPT | Performed by: NURSE PRACTITIONER

## 2024-05-13 PROCEDURE — G0439 PPPS, SUBSEQ VISIT: HCPCS | Performed by: NURSE PRACTITIONER

## 2024-05-13 PROCEDURE — 99213 OFFICE O/P EST LOW 20 MIN: CPT | Performed by: NURSE PRACTITIONER

## 2024-05-13 PROCEDURE — 3078F DIAST BP <80 MM HG: CPT | Performed by: NURSE PRACTITIONER

## 2024-05-13 PROCEDURE — G8399 PT W/DXA RESULTS DOCUMENT: HCPCS | Performed by: NURSE PRACTITIONER

## 2024-05-13 PROCEDURE — 1036F TOBACCO NON-USER: CPT | Performed by: NURSE PRACTITIONER

## 2024-05-13 PROCEDURE — 1123F ACP DISCUSS/DSCN MKR DOCD: CPT | Performed by: NURSE PRACTITIONER

## 2024-05-13 PROCEDURE — 3017F COLORECTAL CA SCREEN DOC REV: CPT | Performed by: NURSE PRACTITIONER

## 2024-05-13 PROCEDURE — 3075F SYST BP GE 130 - 139MM HG: CPT | Performed by: NURSE PRACTITIONER

## 2024-05-13 PROCEDURE — G8427 DOCREV CUR MEDS BY ELIG CLIN: HCPCS | Performed by: NURSE PRACTITIONER

## 2024-05-13 PROCEDURE — G8419 CALC BMI OUT NRM PARAM NOF/U: HCPCS | Performed by: NURSE PRACTITIONER

## 2024-05-13 RX ORDER — LANOLIN ALCOHOL/MO/W.PET/CERES
CREAM (GRAM) TOPICAL
COMMUNITY
Start: 2024-03-01

## 2024-05-13 RX ORDER — GINGER ROOT/GINGER ROOT EXT 262.5 MG
CAPSULE ORAL
COMMUNITY
Start: 2024-04-01

## 2024-05-13 RX ORDER — LANOLIN ALCOHOL/MO/W.PET/CERES
500 CREAM (GRAM) TOPICAL NIGHTLY
COMMUNITY

## 2024-05-13 RX ORDER — MAGNESIUM GLUCONATE 27 MG(500)
500 TABLET ORAL 2 TIMES DAILY
COMMUNITY

## 2024-05-13 NOTE — PROGRESS NOTES
Medicare Annual Wellness Visit    Kim Almanza is here for Medicare AWV (Test thyroid and urine again, dysuria, mild odor, vaginal irritation. Went to  2 different times since October 2023 and was given 2 rounds of atb's, but still symptomatic.She was unable to give urine both times she went. Seen Dr. Stringer 1/25/24 urine checked and was negative. ) and Memory Loss (Feels like she is having trouble remembering things, such as when she walks into a room she won't remember why or what she was supposed to do/get. )    Assessment & Plan   Medicare annual wellness visit, subsequent  Dysuria  -     POCT Urinalysis No Micro (Auto)  -     Urinalysis with Microscopic; Future  -     Culture, Urine; Future  Cystitis  -     POCT Urinalysis No Micro (Auto)  -     Urinalysis with Microscopic; Future  -     Culture, Urine; Future  Vaginal irritation  -     Vaginitis DNA Probe; Future  Vitamin D deficiency  -     Vitamin D 25 Hydroxy; Future  Osteoporosis without current pathological fracture, unspecified osteoporosis type  Comments:  DEXA scan mildly improved from 5/2021. Continue Prolia - injection to be delivered to office on 5/15/24.  Mixed hyperlipidemia      Recommendations for Preventive Services Due: see orders and patient instructions/AVS.  Recommended screening schedule for the next 5-10 years is provided to the patient in written form: see Patient Instructions/AVS.     Return in 6 months (on 11/13/2024), or if symptoms worsen or fail to improve, for chronic conditions, Med Check.     Subjective   The following acute and/or chronic problems were also addressed today:    Here for Medicare Wellness Visit. Overall feeling well.    Urine collected today for dysuria and mild odor. She drinks a lot water normally as well as green and black teas. Tries to stay away from soft drinks.  Also thought she had yeast infection. Bought Vagisil cream, which seems to help. Burning every other day.     Due for Prolia injection

## 2024-05-13 NOTE — PATIENT INSTRUCTIONS
warranty or liability for your use of this information.      Personalized Preventive Plan for Kim Almanza - 5/13/2024  Medicare offers a range of preventive health benefits. Some of the tests and screenings are paid in full while other may be subject to a deductible, co-insurance, and/or copay.    Some of these benefits include a comprehensive review of your medical history including lifestyle, illnesses that may run in your family, and various assessments and screenings as appropriate.    After reviewing your medical record and screening and assessments performed today your provider may have ordered immunizations, labs, imaging, and/or referrals for you.  A list of these orders (if applicable) as well as your Preventive Care list are included within your After Visit Summary for your review.    Other Preventive Recommendations:    A preventive eye exam performed by an eye specialist is recommended every 1-2 years to screen for glaucoma; cataracts, macular degeneration, and other eye disorders.  A preventive dental visit is recommended every 6 months.  Try to get at least 150 minutes of exercise per week or 10,000 steps per day on a pedometer .  Order or download the FREE \"Exercise & Physical Activity: Your Everyday Guide\" from [employer]. Call 1-487.388.7141 or search [employer] online.  You need 2366-9985 mg of calcium and 9743-2850 IU of vitamin D per day. It is possible to meet your calcium requirement with diet alone, but a vitamin D supplement is usually necessary to meet this goal.  When exposed to the sun, use a sunscreen that protects against both UVA and UVB radiation with an SPF of 30 or greater. Reapply every 2 to 3 hours or after sweating, drying off with a towel, or swimming.  Always wear a seat belt when traveling in a car. Always wear a helmet when riding a bicycle or motorcycle.

## 2024-05-14 DIAGNOSIS — N39.0 E. COLI UTI: Primary | ICD-10-CM

## 2024-05-14 DIAGNOSIS — B96.20 E. COLI UTI: Primary | ICD-10-CM

## 2024-05-14 LAB
CANDIDA SPECIES: NEGATIVE
GARDNERELLA VAGINALIS: NEGATIVE
SOURCE: NORMAL
TRICHOMONAS: NEGATIVE

## 2024-05-14 RX ORDER — CEPHALEXIN 500 MG/1
500 CAPSULE ORAL 2 TIMES DAILY
Qty: 14 CAPSULE | Refills: 0 | Status: SHIPPED | OUTPATIENT
Start: 2024-05-14 | End: 2024-05-21

## 2024-05-15 ENCOUNTER — OFFICE VISIT (OUTPATIENT)
Dept: FAMILY MEDICINE CLINIC | Age: 69
End: 2024-05-15
Payer: MEDICARE

## 2024-05-15 VITALS
BODY MASS INDEX: 25.87 KG/M2 | HEART RATE: 65 BPM | WEIGHT: 146 LBS | DIASTOLIC BLOOD PRESSURE: 74 MMHG | SYSTOLIC BLOOD PRESSURE: 115 MMHG | HEIGHT: 63 IN

## 2024-05-15 DIAGNOSIS — M81.0 OSTEOPOROSIS WITHOUT CURRENT PATHOLOGICAL FRACTURE, UNSPECIFIED OSTEOPOROSIS TYPE: Primary | ICD-10-CM

## 2024-05-15 LAB
MICROORGANISM SPEC CULT: ABNORMAL
SPECIMEN DESCRIPTION: ABNORMAL

## 2024-05-15 PROCEDURE — 96372 THER/PROPH/DIAG INJ SC/IM: CPT | Performed by: NURSE PRACTITIONER

## 2024-05-15 PROCEDURE — 3074F SYST BP LT 130 MM HG: CPT | Performed by: NURSE PRACTITIONER

## 2024-05-15 PROCEDURE — G8419 CALC BMI OUT NRM PARAM NOF/U: HCPCS | Performed by: NURSE PRACTITIONER

## 2024-05-15 PROCEDURE — 99211 OFF/OP EST MAY X REQ PHY/QHP: CPT | Performed by: NURSE PRACTITIONER

## 2024-05-15 PROCEDURE — 3078F DIAST BP <80 MM HG: CPT | Performed by: NURSE PRACTITIONER

## 2024-05-15 PROCEDURE — G8427 DOCREV CUR MEDS BY ELIG CLIN: HCPCS | Performed by: NURSE PRACTITIONER

## 2024-05-15 ASSESSMENT — PATIENT HEALTH QUESTIONNAIRE - PHQ9
2. FEELING DOWN, DEPRESSED OR HOPELESS: NOT AT ALL
SUM OF ALL RESPONSES TO PHQ9 QUESTIONS 1 & 2: 0
4. FEELING TIRED OR HAVING LITTLE ENERGY: NEARLY EVERY DAY
SUM OF ALL RESPONSES TO PHQ QUESTIONS 1-9: 6
6. FEELING BAD ABOUT YOURSELF - OR THAT YOU ARE A FAILURE OR HAVE LET YOURSELF OR YOUR FAMILY DOWN: NOT AT ALL
7. TROUBLE CONCENTRATING ON THINGS, SUCH AS READING THE NEWSPAPER OR WATCHING TELEVISION: MORE THAN HALF THE DAYS
8. MOVING OR SPEAKING SO SLOWLY THAT OTHER PEOPLE COULD HAVE NOTICED. OR THE OPPOSITE, BEING SO FIGETY OR RESTLESS THAT YOU HAVE BEEN MOVING AROUND A LOT MORE THAN USUAL: NOT AT ALL
SUM OF ALL RESPONSES TO PHQ QUESTIONS 1-9: 6
3. TROUBLE FALLING OR STAYING ASLEEP: SEVERAL DAYS
SUM OF ALL RESPONSES TO PHQ QUESTIONS 1-9: 6
9. THOUGHTS THAT YOU WOULD BE BETTER OFF DEAD, OR OF HURTING YOURSELF: NOT AT ALL
1. LITTLE INTEREST OR PLEASURE IN DOING THINGS: NOT AT ALL
SUM OF ALL RESPONSES TO PHQ QUESTIONS 1-9: 6
10. IF YOU CHECKED OFF ANY PROBLEMS, HOW DIFFICULT HAVE THESE PROBLEMS MADE IT FOR YOU TO DO YOUR WORK, TAKE CARE OF THINGS AT HOME, OR GET ALONG WITH OTHER PEOPLE: NOT DIFFICULT AT ALL
5. POOR APPETITE OR OVEREATING: NOT AT ALL

## 2024-05-15 NOTE — PROGRESS NOTES
Patient arrives today for Prolia injection.  Injection received from specialty pharmacy earlier today.  Injection given subcutaneously in left arm.  Patient tolerated well.  Next injection will be due November 15, 2024.

## 2024-05-24 ENCOUNTER — HOSPITAL ENCOUNTER (OUTPATIENT)
Facility: CLINIC | Age: 69
Discharge: HOME | End: 2024-05-24
Payer: MEDICARE

## 2024-05-24 DIAGNOSIS — E55.9 VITAMIN D DEFICIENCY: ICD-10-CM

## 2024-05-24 LAB — 25(OH)D3 SERPL-MCNC: 62.7 NG/ML (ref 30–100)

## 2024-05-24 PROCEDURE — 36415 COLL VENOUS BLD VENIPUNCTURE: CPT

## 2024-05-24 PROCEDURE — 82306 VITAMIN D 25 HYDROXY: CPT

## 2024-07-05 ENCOUNTER — HOSPITAL ENCOUNTER (OUTPATIENT)
Age: 69
Setting detail: SPECIMEN
Discharge: HOME | End: 2024-07-05

## 2024-07-05 ENCOUNTER — NURSE ONLY (OUTPATIENT)
Dept: FAMILY MEDICINE CLINIC | Age: 69
End: 2024-07-05
Payer: MEDICARE

## 2024-07-05 VITALS — OXYGEN SATURATION: 97 % | WEIGHT: 143 LBS | HEART RATE: 66 BPM | BODY MASS INDEX: 25.33 KG/M2

## 2024-07-05 DIAGNOSIS — R30.0 DYSURIA: Primary | ICD-10-CM

## 2024-07-05 DIAGNOSIS — N30.90 CYSTITIS: ICD-10-CM

## 2024-07-05 DIAGNOSIS — R30.0 DYSURIA: ICD-10-CM

## 2024-07-05 LAB
BILIRUB UR QL STRIP: ABNORMAL
BILIRUBIN, POC: ABNORMAL
BLOOD URINE, POC: NEGATIVE
CASTS #/AREA URNS LPF: ABNORMAL /LPF (ref 0–2)
CASTS #/AREA URNS LPF: ABNORMAL /LPF (ref 0–2)
CLARITY UR: ABNORMAL
CLARITY, POC: CLEAR
COLOR UR: ABNORMAL
COLOR, POC: YELLOW
CRYSTALS URNS MICRO: ABNORMAL /HPF
CRYSTALS URNS MICRO: ABNORMAL /HPF
EPI CELLS #/AREA URNS HPF: ABNORMAL /HPF (ref 0–5)
GLUCOSE UR STRIP-MCNC: NEGATIVE MG/DL
GLUCOSE URINE, POC: NEGATIVE
HGB UR QL STRIP.AUTO: NEGATIVE
KETONES UR STRIP-MCNC: ABNORMAL MG/DL
KETONES, POC: 15
LEUKOCYTE EST, POC: ABNORMAL
LEUKOCYTE ESTERASE UR QL STRIP: ABNORMAL
MUCOUS THREADS URNS QL MICRO: ABNORMAL
NITRITE UR QL STRIP: NEGATIVE
NITRITE, POC: NEGATIVE
PH UR STRIP: 5 [PH] (ref 5–8)
PH, POC: 5.5
PROT UR STRIP-MCNC: ABNORMAL MG/DL
PROTEIN, POC: NEGATIVE
RBC #/AREA URNS HPF: ABNORMAL /HPF (ref 0–2)
SP GR UR STRIP: 1.03 (ref 1–1.03)
SPECIFIC GRAVITY, POC: 1.03
UROBILINOGEN UR STRIP-ACNC: NORMAL EU/DL (ref 0–1)
UROBILINOGEN, POC: 0.2
WBC #/AREA URNS HPF: ABNORMAL /HPF (ref 0–5)

## 2024-07-05 PROCEDURE — 99211 OFF/OP EST MAY X REQ PHY/QHP: CPT | Performed by: NURSE PRACTITIONER

## 2024-07-05 PROCEDURE — 81003 URINALYSIS AUTO W/O SCOPE: CPT | Performed by: NURSE PRACTITIONER

## 2024-07-05 RX ORDER — DENOSUMAB 60 MG/ML
60 INJECTION SUBCUTANEOUS ONCE
COMMUNITY
Start: 2024-05-13

## 2024-07-05 RX ORDER — GABAPENTIN 100 MG/1
100 CAPSULE ORAL 3 TIMES DAILY
COMMUNITY
Start: 2024-05-23

## 2024-07-06 LAB
MICROORGANISM SPEC CULT: NORMAL
SERVICE CMNT-IMP: NORMAL
SPECIMEN DESCRIPTION: NORMAL

## 2024-07-23 DIAGNOSIS — E78.2 MIXED HYPERLIPIDEMIA: ICD-10-CM

## 2024-07-23 DIAGNOSIS — I10 PRIMARY HYPERTENSION: ICD-10-CM

## 2024-07-23 RX ORDER — AMLODIPINE BESYLATE 5 MG/1
5 TABLET ORAL DAILY
Qty: 100 TABLET | Refills: 2 | Status: SHIPPED | OUTPATIENT
Start: 2024-07-23

## 2024-07-23 RX ORDER — ATORVASTATIN CALCIUM 20 MG/1
20 TABLET, FILM COATED ORAL DAILY
Qty: 100 TABLET | Refills: 2 | Status: SHIPPED | OUTPATIENT
Start: 2024-07-23

## 2024-07-23 NOTE — TELEPHONE ENCOUNTER
Kim Almanza is calling to request a refill on the following medication(s):    Last Visit Date (If Applicable):  5/15/2024    Next Visit Date:    Visit date not found    Medication Request:  Requested Prescriptions     Pending Prescriptions Disp Refills    amLODIPine (NORVASC) 5 MG tablet [Pharmacy Med Name: amLODIPine Besylate 5 MG Oral Tablet] 100 tablet 2     Sig: TAKE 1 TABLET BY MOUTH DAILY    atorvastatin (LIPITOR) 20 MG tablet [Pharmacy Med Name: Atorvastatin Calcium 20 MG Oral Tablet] 100 tablet 2     Sig: TAKE 1 TABLET BY MOUTH ONCE  DAILY

## 2024-07-26 ENCOUNTER — PATIENT MESSAGE (OUTPATIENT)
Dept: FAMILY MEDICINE CLINIC | Age: 69
End: 2024-07-26

## 2024-07-26 DIAGNOSIS — J43.9 PULMONARY EMPHYSEMA, UNSPECIFIED EMPHYSEMA TYPE (HCC): ICD-10-CM

## 2024-07-26 RX ORDER — ALBUTEROL SULFATE 90 UG/1
2 AEROSOL, METERED RESPIRATORY (INHALATION) EVERY 6 HOURS PRN
Qty: 18 G | Refills: 2 | Status: SHIPPED | OUTPATIENT
Start: 2024-07-26

## 2024-07-26 NOTE — TELEPHONE ENCOUNTER
Kim Almanza is calling to request a refill on the following medication(s):    Last Visit Date (If Applicable):  5/15/2024    Next Visit Date:    Visit date not found    Medication Request:  Requested Prescriptions     Pending Prescriptions Disp Refills    albuterol sulfate HFA (PROVENTIL;VENTOLIN;PROAIR) 108 (90 Base) MCG/ACT inhaler 18 g 2     Sig: Inhale 2 puffs into the lungs every 6 hours as needed for Wheezing or Shortness of Breath

## 2024-07-29 RX ORDER — PERPHENAZINE 16 MG
600 TABLET ORAL DAILY
Qty: 90 CAPSULE | Refills: 1 | Status: SHIPPED | OUTPATIENT
Start: 2024-07-29

## 2024-07-29 NOTE — TELEPHONE ENCOUNTER
From: Kim Almanza  To: Maria G Hutton  Sent: 7/26/2024 3:50 PM EDT  Subject: RX    Can you give me a rx for R Fraction Alpha Lipoic Acid Triple Strength 600 mg. I need this for the tingling and burning in my feet. Please send it to [facility]. Thank you.  
normal...

## 2024-10-07 DIAGNOSIS — J43.9 PULMONARY EMPHYSEMA, UNSPECIFIED EMPHYSEMA TYPE (HCC): ICD-10-CM

## 2024-10-07 RX ORDER — ALBUTEROL SULFATE 90 UG/1
2 INHALANT RESPIRATORY (INHALATION) EVERY 6 HOURS PRN
Qty: 34 G | Refills: 2 | Status: SHIPPED | OUTPATIENT
Start: 2024-10-07

## 2024-10-07 NOTE — TELEPHONE ENCOUNTER
Kim Almanza is calling to request a refill on the following medication(s):    Last Visit Date (If Applicable):  5/15/2024    Next Visit Date:    11/15/2024    Medication Request:  Requested Prescriptions     Pending Prescriptions Disp Refills    albuterol sulfate HFA (PROVENTIL;VENTOLIN;PROAIR) 108 (90 Base) MCG/ACT inhaler [Pharmacy Med Name: ALBUTEROL HFA 90MCG/ACT (PA)] 34 g 2     Sig: INHALE 2 PUFFS INTO THE LUNGS  EVERY 6 HOURS AS NEEDED FOR  WHEEZING OR SHORTNESS OF BREATH

## 2024-12-11 ENCOUNTER — OFFICE VISIT (OUTPATIENT)
Dept: FAMILY MEDICINE CLINIC | Age: 69
End: 2024-12-11

## 2024-12-11 VITALS
BODY MASS INDEX: 24.1 KG/M2 | HEIGHT: 63 IN | WEIGHT: 136 LBS | SYSTOLIC BLOOD PRESSURE: 130 MMHG | HEART RATE: 58 BPM | DIASTOLIC BLOOD PRESSURE: 73 MMHG

## 2024-12-11 DIAGNOSIS — F33.1 MODERATE EPISODE OF RECURRENT MAJOR DEPRESSIVE DISORDER (HCC): ICD-10-CM

## 2024-12-11 DIAGNOSIS — F51.04 PSYCHOPHYSIOLOGICAL INSOMNIA: ICD-10-CM

## 2024-12-11 DIAGNOSIS — J43.9 PULMONARY EMPHYSEMA, UNSPECIFIED EMPHYSEMA TYPE (HCC): ICD-10-CM

## 2024-12-11 DIAGNOSIS — G12.20 MOTOR NEURON DISEASE (HCC): ICD-10-CM

## 2024-12-11 DIAGNOSIS — M81.0 OSTEOPOROSIS WITHOUT CURRENT PATHOLOGICAL FRACTURE, UNSPECIFIED OSTEOPOROSIS TYPE: Primary | ICD-10-CM

## 2024-12-11 RX ORDER — AMITRIPTYLINE HYDROCHLORIDE 10 MG/1
10 TABLET ORAL NIGHTLY
Qty: 30 TABLET | Refills: 2 | Status: SHIPPED | OUTPATIENT
Start: 2024-12-11

## 2024-12-11 ASSESSMENT — PATIENT HEALTH QUESTIONNAIRE - PHQ9
SUM OF ALL RESPONSES TO PHQ9 QUESTIONS 1 & 2: 0
1. LITTLE INTEREST OR PLEASURE IN DOING THINGS: NOT AT ALL
SUM OF ALL RESPONSES TO PHQ QUESTIONS 1-9: 6
8. MOVING OR SPEAKING SO SLOWLY THAT OTHER PEOPLE COULD HAVE NOTICED. OR THE OPPOSITE, BEING SO FIGETY OR RESTLESS THAT YOU HAVE BEEN MOVING AROUND A LOT MORE THAN USUAL: NOT AT ALL
3. TROUBLE FALLING OR STAYING ASLEEP: NEARLY EVERY DAY
SUM OF ALL RESPONSES TO PHQ QUESTIONS 1-9: 6
4. FEELING TIRED OR HAVING LITTLE ENERGY: NEARLY EVERY DAY
10. IF YOU CHECKED OFF ANY PROBLEMS, HOW DIFFICULT HAVE THESE PROBLEMS MADE IT FOR YOU TO DO YOUR WORK, TAKE CARE OF THINGS AT HOME, OR GET ALONG WITH OTHER PEOPLE: VERY DIFFICULT
9. THOUGHTS THAT YOU WOULD BE BETTER OFF DEAD, OR OF HURTING YOURSELF: NOT AT ALL
6. FEELING BAD ABOUT YOURSELF - OR THAT YOU ARE A FAILURE OR HAVE LET YOURSELF OR YOUR FAMILY DOWN: NOT AT ALL
7. TROUBLE CONCENTRATING ON THINGS, SUCH AS READING THE NEWSPAPER OR WATCHING TELEVISION: NOT AT ALL
2. FEELING DOWN, DEPRESSED OR HOPELESS: NOT AT ALL
5. POOR APPETITE OR OVEREATING: NOT AT ALL
SUM OF ALL RESPONSES TO PHQ QUESTIONS 1-9: 6
SUM OF ALL RESPONSES TO PHQ QUESTIONS 1-9: 6

## 2024-12-11 ASSESSMENT — ENCOUNTER SYMPTOMS
CONSTIPATION: 0
ABDOMINAL PAIN: 0
SHORTNESS OF BREATH: 0
COLOR CHANGE: 0
DIARRHEA: 0
CHEST TIGHTNESS: 0

## 2024-12-11 NOTE — PROGRESS NOTES
Kim Almanza is a 69 y.o. female who presents in office today with Self follow up on chronic conditions including:   Patient Active Problem List   Diagnosis    Allergic rhinitis    Osteoporosis without current pathological fracture    Impaired fasting glucose    Elevated LFTs    Burning sensation of feet    Personal history of tobacco use    Muscle spasms of both lower extremities    Night muscle spasms    Subclinical hyperthyroidism    Abnormal TSH    Pulmonary emphysema, unspecified emphysema type (HCC)    Moderate episode of recurrent major depressive disorder (HCC)    Motor neuron disease (HCC)    Prediabetes    Primary hypertension    Mixed hyperlipidemia       Chief Complaint   Patient presents with    Osteoporosis     Needs Prolia injection.    Insomnia     Trouble getting to sleep. She doesn't go to sleep until 3-4 AM. No energy, has to push herself to get things done.         History of Present Illness:     HPI    Here for chronic condition follow up and Prolia injection. Last Prolia given 5/15/24.    Has been having difficulty getting to sleep over the last 3 months. Once she is asleep, will wake up around 3-4 AM and takes a long time to fall back to sleep. She is feeling very tired during the day time and sometimes taking naps. Unsure of any life changes in the last few months but does report difficulty turning her mind off at night time. Has tried melatonin 5 mg with mild improvement but only for a short time. Feels depression symptoms are stable. Still working on selling home in [place] that she shared with her ex . Has never followed with a therapist, but is agreeable to referral.     Unclear determination of motor neuron disease. Last neurologist was Dr. Can. Provider left practice and Kim lost follow up. Contacted insurance for new in-network provider, received name, but did not schedule appointment as she had seen that person in the past and did not feel comfortable

## 2024-12-13 ENCOUNTER — TELEPHONE (OUTPATIENT)
Dept: FAMILY MEDICINE CLINIC | Age: 69
End: 2024-12-13

## 2024-12-28 ENCOUNTER — PATIENT MESSAGE (OUTPATIENT)
Dept: FAMILY MEDICINE CLINIC | Age: 69
End: 2024-12-28

## 2025-01-03 DIAGNOSIS — F33.1 MODERATE EPISODE OF RECURRENT MAJOR DEPRESSIVE DISORDER (HCC): ICD-10-CM

## 2025-01-03 DIAGNOSIS — F51.04 PSYCHOPHYSIOLOGICAL INSOMNIA: ICD-10-CM

## 2025-01-03 RX ORDER — AMITRIPTYLINE HYDROCHLORIDE 10 MG/1
10 TABLET ORAL NIGHTLY
Qty: 90 TABLET | Refills: 1 | Status: SHIPPED | OUTPATIENT
Start: 2025-01-03

## 2025-01-10 ENCOUNTER — PATIENT MESSAGE (OUTPATIENT)
Dept: FAMILY MEDICINE CLINIC | Age: 70
End: 2025-01-10

## 2025-01-10 DIAGNOSIS — F33.1 MODERATE EPISODE OF RECURRENT MAJOR DEPRESSIVE DISORDER (HCC): ICD-10-CM

## 2025-01-10 DIAGNOSIS — F51.04 PSYCHOPHYSIOLOGICAL INSOMNIA: ICD-10-CM

## 2025-01-13 RX ORDER — AMITRIPTYLINE HYDROCHLORIDE 10 MG/1
10 TABLET ORAL NIGHTLY
Qty: 90 TABLET | Refills: 1 | Status: SHIPPED | OUTPATIENT
Start: 2025-01-13 | End: 2025-01-15 | Stop reason: SDUPTHER

## 2025-01-15 ENCOUNTER — PATIENT MESSAGE (OUTPATIENT)
Dept: FAMILY MEDICINE CLINIC | Age: 70
End: 2025-01-15

## 2025-01-15 DIAGNOSIS — F33.1 MODERATE EPISODE OF RECURRENT MAJOR DEPRESSIVE DISORDER (HCC): ICD-10-CM

## 2025-01-15 DIAGNOSIS — F51.04 PSYCHOPHYSIOLOGICAL INSOMNIA: ICD-10-CM

## 2025-01-15 RX ORDER — AMITRIPTYLINE HYDROCHLORIDE 10 MG/1
10 TABLET ORAL NIGHTLY
Qty: 30 TABLET | Refills: 1 | Status: SHIPPED | OUTPATIENT
Start: 2025-01-15

## 2025-01-22 ENCOUNTER — OFFICE VISIT (OUTPATIENT)
Dept: FAMILY MEDICINE CLINIC | Age: 70
End: 2025-01-22

## 2025-01-22 VITALS
HEART RATE: 68 BPM | SYSTOLIC BLOOD PRESSURE: 124 MMHG | BODY MASS INDEX: 23.74 KG/M2 | WEIGHT: 134 LBS | DIASTOLIC BLOOD PRESSURE: 76 MMHG | HEIGHT: 63 IN

## 2025-01-22 DIAGNOSIS — E05.90 SUBCLINICAL HYPERTHYROIDISM: ICD-10-CM

## 2025-01-22 DIAGNOSIS — E78.2 MIXED HYPERLIPIDEMIA: ICD-10-CM

## 2025-01-22 DIAGNOSIS — Z87.891 PERSONAL HISTORY OF TOBACCO USE: ICD-10-CM

## 2025-01-22 DIAGNOSIS — F51.04 PSYCHOPHYSIOLOGICAL INSOMNIA: ICD-10-CM

## 2025-01-22 DIAGNOSIS — Z59.41 FOOD INSECURITY: ICD-10-CM

## 2025-01-22 DIAGNOSIS — E53.8 FOLATE DEFICIENCY: ICD-10-CM

## 2025-01-22 DIAGNOSIS — Z00.00 MEDICARE ANNUAL WELLNESS VISIT, SUBSEQUENT: Primary | ICD-10-CM

## 2025-01-22 DIAGNOSIS — Z59.86 FINANCIAL INSECURITY: ICD-10-CM

## 2025-01-22 DIAGNOSIS — G12.20 MOTOR NEURON DISEASE (HCC): ICD-10-CM

## 2025-01-22 DIAGNOSIS — F33.1 MODERATE EPISODE OF RECURRENT MAJOR DEPRESSIVE DISORDER (HCC): ICD-10-CM

## 2025-01-22 DIAGNOSIS — J43.9 PULMONARY EMPHYSEMA, UNSPECIFIED EMPHYSEMA TYPE (HCC): ICD-10-CM

## 2025-01-22 DIAGNOSIS — R73.03 PREDIABETES: ICD-10-CM

## 2025-01-22 DIAGNOSIS — Z51.81 MEDICATION MONITORING ENCOUNTER: ICD-10-CM

## 2025-01-22 DIAGNOSIS — E55.9 VITAMIN D DEFICIENCY: ICD-10-CM

## 2025-01-22 DIAGNOSIS — M81.0 OSTEOPOROSIS WITHOUT CURRENT PATHOLOGICAL FRACTURE, UNSPECIFIED OSTEOPOROSIS TYPE: ICD-10-CM

## 2025-01-22 DIAGNOSIS — Z13.6 SCREENING FOR CARDIOVASCULAR CONDITION: ICD-10-CM

## 2025-01-22 RX ORDER — ATORVASTATIN CALCIUM 20 MG/1
20 TABLET, FILM COATED ORAL DAILY
Qty: 100 TABLET | Refills: 2 | Status: SHIPPED | OUTPATIENT
Start: 2025-01-22 | End: 2025-01-22

## 2025-01-22 RX ORDER — AMITRIPTYLINE HYDROCHLORIDE 10 MG/1
10 TABLET ORAL NIGHTLY
Qty: 10 TABLET | Refills: 0 | Status: SHIPPED | OUTPATIENT
Start: 2025-01-22

## 2025-01-22 RX ORDER — ATORVASTATIN CALCIUM 20 MG/1
20 TABLET, FILM COATED ORAL DAILY
Qty: 100 TABLET | Refills: 2 | Status: SHIPPED | OUTPATIENT
Start: 2025-01-22

## 2025-01-22 SDOH — ECONOMIC STABILITY: FOOD INSECURITY: WITHIN THE PAST 12 MONTHS, THE FOOD YOU BOUGHT JUST DIDN'T LAST AND YOU DIDN'T HAVE MONEY TO GET MORE.: SOMETIMES TRUE

## 2025-01-22 SDOH — ECONOMIC STABILITY - FOOD INSECURITY: FOOD INSECURITY: Z59.41

## 2025-01-22 SDOH — ECONOMIC STABILITY - INCOME SECURITY: FINANCIAL INSECURITY: Z59.86

## 2025-01-22 SDOH — ECONOMIC STABILITY: FOOD INSECURITY: WITHIN THE PAST 12 MONTHS, YOU WORRIED THAT YOUR FOOD WOULD RUN OUT BEFORE YOU GOT MONEY TO BUY MORE.: SOMETIMES TRUE

## 2025-01-22 ASSESSMENT — PATIENT HEALTH QUESTIONNAIRE - PHQ9
8. MOVING OR SPEAKING SO SLOWLY THAT OTHER PEOPLE COULD HAVE NOTICED. OR THE OPPOSITE, BEING SO FIGETY OR RESTLESS THAT YOU HAVE BEEN MOVING AROUND A LOT MORE THAN USUAL: NOT AT ALL
10. IF YOU CHECKED OFF ANY PROBLEMS, HOW DIFFICULT HAVE THESE PROBLEMS MADE IT FOR YOU TO DO YOUR WORK, TAKE CARE OF THINGS AT HOME, OR GET ALONG WITH OTHER PEOPLE: VERY DIFFICULT
SUM OF ALL RESPONSES TO PHQ QUESTIONS 1-9: 7
9. THOUGHTS THAT YOU WOULD BE BETTER OFF DEAD, OR OF HURTING YOURSELF: NOT AT ALL
2. FEELING DOWN, DEPRESSED OR HOPELESS: SEVERAL DAYS
4. FEELING TIRED OR HAVING LITTLE ENERGY: NEARLY EVERY DAY
3. TROUBLE FALLING OR STAYING ASLEEP: NEARLY EVERY DAY
5. POOR APPETITE OR OVEREATING: NOT AT ALL
1. LITTLE INTEREST OR PLEASURE IN DOING THINGS: NOT AT ALL
7. TROUBLE CONCENTRATING ON THINGS, SUCH AS READING THE NEWSPAPER OR WATCHING TELEVISION: NOT AT ALL
SUM OF ALL RESPONSES TO PHQ QUESTIONS 1-9: 7
SUM OF ALL RESPONSES TO PHQ QUESTIONS 1-9: 7
6. FEELING BAD ABOUT YOURSELF - OR THAT YOU ARE A FAILURE OR HAVE LET YOURSELF OR YOUR FAMILY DOWN: NOT AT ALL
SUM OF ALL RESPONSES TO PHQ9 QUESTIONS 1 & 2: 1
SUM OF ALL RESPONSES TO PHQ QUESTIONS 1-9: 7

## 2025-01-22 ASSESSMENT — LIFESTYLE VARIABLES
HOW OFTEN DO YOU HAVE A DRINK CONTAINING ALCOHOL: MONTHLY OR LESS
HOW MANY STANDARD DRINKS CONTAINING ALCOHOL DO YOU HAVE ON A TYPICAL DAY: 3 OR 4

## 2025-01-22 NOTE — PROGRESS NOTES
Medicare Annual Wellness Visit    Kim Almanza is here for Medicare AWV    Assessment & Plan   Medicare annual wellness visit, subsequent  Moderate episode of recurrent major depressive disorder (HCC)  Comments:  PHQ-9 score 7, feels depression is stable but score attributed to fatigue and difficulty sleeping.  Referred to behavioral health, continue amitriptyline at HS.  Orders:  -     amitriptyline (ELAVIL) 10 MG tablet; Take 1 tablet by mouth nightly, Disp-10 tablet, R-0Please fill short supply while patient awaits shipmen from mail order pharmacy.Normal  Pulmonary emphysema, unspecified emphysema type (HCC)  Comments:  Last CT lung screening 2021.  Previous order was canceled due to unclear pack year history.  Clarified today, at least 20+ pack years.  Orders:  -     CBC; Future  -     Vitamin B12 & Folate; Future  -     Vitamin D 25 Hydroxy; Future  -     Lipid Panel; Future  -     Comprehensive Metabolic Panel, Fasting; Future  Mixed hyperlipidemia  -     CBC; Future  -     Vitamin B12 & Folate; Future  -     Vitamin D 25 Hydroxy; Future  -     Lipid Panel; Future  -     Comprehensive Metabolic Panel, Fasting; Future  -     atorvastatin (LIPITOR) 20 MG tablet; Take 1 tablet by mouth daily, Disp-100 tablet, R-2Please send a replace/new response with 100-Day Supply if appropriate to maximize member benefit. Requesting 1 year supply.Normal  Osteoporosis without current pathological fracture, unspecified osteoporosis type  -     CBC; Future  -     Vitamin B12 & Folate; Future  -     Vitamin D 25 Hydroxy; Future  -     Lipid Panel; Future  -     Comprehensive Metabolic Panel, Fasting; Future  Personal history of tobacco use  -     ID VISIT TO DISCUSS LUNG CA SCREEN W LDCT  -     CT Lung Screen (Initial/Annual/Baseline); Future  -     CBC; Future  -     Vitamin B12 & Folate; Future  -     Vitamin D 25 Hydroxy; Future  -     Lipid Panel; Future  -     Comprehensive Metabolic Panel, Fasting; Future  Food

## 2025-01-22 NOTE — PATIENT INSTRUCTIONS
relieve stress?  Mindfulness can help quiet your mind and relax your body. Studies show that it can help some people sleep better, feel less anxious, and bring their blood pressure down. And it's been shown to help some people live and cope better with certain health problems like heart disease, depression, chronic pain, and cancer.  How do you practice mindfulness?  To be mindful is to pay attention, to be present, and to be accepting. Like any new skill or habit, being mindful can take practice.  When you're mindful, you do just one thing and you pay close attention to that one thing. For example, you may sit quietly and notice your emotions or how your food tastes and smells.  When you're present, you focus on the things that are happening right now. You let go of your thoughts about the past and the future. When you dwell on the past or the future, you miss moments that can heal and strengthen you. You may miss moments like hearing a child laugh or seeing a friendly face when you think you're all alone.  When you're accepting, you don't  the present moment. Instead you accept your thoughts and feelings as they come.  You can practice anytime, anywhere, and in any way you choose. You can practice in many ways. Here are a few ideas:  While doing your chores, like washing the dishes, let your mind focus on what's in your hand. What does the dish feel like? Is the water warm or cold?  Go outside and take a few deep breaths. What is the air like? Is it warm or cold?  When you can, take some time at the start of your day to sit alone and think.  Take a slow walk by yourself. Count your steps while you breathe in and out.  Try yoga breathing exercises, stretches, and poses to strengthen and relax your muscles.  At work, if you can, try to stop for a few moments each hour. Note how your body feels. Let yourself regroup and let your mind settle before you return to what you were doing.  If you struggle with anxiety

## 2025-01-27 ENCOUNTER — OFFICE VISIT (OUTPATIENT)
Dept: BEHAVIORAL/MENTAL HEALTH CLINIC | Age: 70
End: 2025-01-27
Payer: MEDICARE

## 2025-01-27 DIAGNOSIS — Z63.0 PROBLEMS IN RELATIONSHIP WITH SPOUSE OR PARTNER: ICD-10-CM

## 2025-01-27 DIAGNOSIS — F33.1 MAJOR DEPRESSIVE DISORDER, RECURRENT EPISODE, MODERATE WITH ANXIOUS DISTRESS (HCC): Primary | ICD-10-CM

## 2025-01-27 DIAGNOSIS — F43.29 GRIEF REACTION WITH PROLONGED BEREAVEMENT: ICD-10-CM

## 2025-01-27 DIAGNOSIS — G47.00 INSOMNIA, UNSPECIFIED TYPE: ICD-10-CM

## 2025-01-27 PROCEDURE — 1123F ACP DISCUSS/DSCN MKR DOCD: CPT | Performed by: COUNSELOR

## 2025-01-27 PROCEDURE — 90791 PSYCH DIAGNOSTIC EVALUATION: CPT | Performed by: COUNSELOR

## 2025-01-27 SDOH — SOCIAL STABILITY - SOCIAL INSECURITY: PROBLEMS IN RELATIONSHIP WITH SPOUSE OR PARTNER: Z63.0

## 2025-01-27 ASSESSMENT — PATIENT HEALTH QUESTIONNAIRE - PHQ9
SUM OF ALL RESPONSES TO PHQ QUESTIONS 1-9: 18
10. IF YOU CHECKED OFF ANY PROBLEMS, HOW DIFFICULT HAVE THESE PROBLEMS MADE IT FOR YOU TO DO YOUR WORK, TAKE CARE OF THINGS AT HOME, OR GET ALONG WITH OTHER PEOPLE: SOMEWHAT DIFFICULT
4. FEELING TIRED OR HAVING LITTLE ENERGY: NEARLY EVERY DAY
SUM OF ALL RESPONSES TO PHQ QUESTIONS 1-9: 18
8. MOVING OR SPEAKING SO SLOWLY THAT OTHER PEOPLE COULD HAVE NOTICED. OR THE OPPOSITE, BEING SO FIGETY OR RESTLESS THAT YOU HAVE BEEN MOVING AROUND A LOT MORE THAN USUAL: MORE THAN HALF THE DAYS
2. FEELING DOWN, DEPRESSED OR HOPELESS: MORE THAN HALF THE DAYS
SUM OF ALL RESPONSES TO PHQ QUESTIONS 1-9: 18
3. TROUBLE FALLING OR STAYING ASLEEP: NEARLY EVERY DAY
SUM OF ALL RESPONSES TO PHQ QUESTIONS 1-9: 18
9. THOUGHTS THAT YOU WOULD BE BETTER OFF DEAD, OR OF HURTING YOURSELF: NOT AT ALL
5. POOR APPETITE OR OVEREATING: NEARLY EVERY DAY
SUM OF ALL RESPONSES TO PHQ9 QUESTIONS 1 & 2: 3
1. LITTLE INTEREST OR PLEASURE IN DOING THINGS: SEVERAL DAYS
7. TROUBLE CONCENTRATING ON THINGS, SUCH AS READING THE NEWSPAPER OR WATCHING TELEVISION: MORE THAN HALF THE DAYS
6. FEELING BAD ABOUT YOURSELF - OR THAT YOU ARE A FAILURE OR HAVE LET YOURSELF OR YOUR FAMILY DOWN: MORE THAN HALF THE DAYS

## 2025-01-27 ASSESSMENT — ANXIETY QUESTIONNAIRES
3. WORRYING TOO MUCH ABOUT DIFFERENT THINGS: MORE THAN HALF THE DAYS
6. BECOMING EASILY ANNOYED OR IRRITABLE: SEVERAL DAYS
GAD7 TOTAL SCORE: 12
7. FEELING AFRAID AS IF SOMETHING AWFUL MIGHT HAPPEN: SEVERAL DAYS
4. TROUBLE RELAXING: MORE THAN HALF THE DAYS
IF YOU CHECKED OFF ANY PROBLEMS ON THIS QUESTIONNAIRE, HOW DIFFICULT HAVE THESE PROBLEMS MADE IT FOR YOU TO DO YOUR WORK, TAKE CARE OF THINGS AT HOME, OR GET ALONG WITH OTHER PEOPLE: SOMEWHAT DIFFICULT
5. BEING SO RESTLESS THAT IT IS HARD TO SIT STILL: MORE THAN HALF THE DAYS
2. NOT BEING ABLE TO STOP OR CONTROL WORRYING: MORE THAN HALF THE DAYS
1. FEELING NERVOUS, ANXIOUS, OR ON EDGE: MORE THAN HALF THE DAYS

## 2025-01-27 NOTE — PROGRESS NOTES
FAMILY MEDICAL/MH HISTORY   Her family history includes Arthritis in her sister; Depression in her sister; High Blood Pressure in her brother and sister; Lung Cancer in her brother and father.    PATIENT MENTAL HEALTH HISTORY  Patient denied any history of outpatient counseling or psychiatric hospitalizations     PSYCHOSOCIAL HISTORY  Current living situation: Kim reports living alone in an apartment with no pets.  She moved to [place] in  to be closer to her grandson (Stan) who is 13 years old.  She cares for him every other weekend.  This her daughter's son who resides with his father in [place].  Her daughter has her son on school breaks and in the summer and daughter currently resides in [place].      Relationship status:  Kim reports being legally  from her  who reportedly lives in [place].  She reports that he has a history of alcoholism and substance abuse which has resulted in legal problems for him.    Children:  Daughter (34) lives in [place];  Son ().      Work/Education: Retired in 2019.  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 Her smoking use included cigarettes. She started smoking about 42 years ago. She has a 39.6 pack-year smoking history. She has never used smokeless tobacco.     ALCOHOL:  She reports current alcohol use. \"Occasionally\" - every three to four months 1-2 drinks at a time    OTHER SUBSTANCES: She reports no history of drug use.       ASSESSMENT  Kim Almanza presented to the appointment today for evaluation and treatment of symptoms of    Diagnosis Orders   1. Major depressive disorder, recurrent episode,

## 2025-02-05 ENCOUNTER — TELEMEDICINE (OUTPATIENT)
Dept: BEHAVIORAL/MENTAL HEALTH CLINIC | Age: 70
End: 2025-02-05
Payer: MEDICARE

## 2025-02-05 DIAGNOSIS — Z63.0 PROBLEMS IN RELATIONSHIP WITH SPOUSE OR PARTNER: ICD-10-CM

## 2025-02-05 DIAGNOSIS — F33.1 MAJOR DEPRESSIVE DISORDER, RECURRENT EPISODE, MODERATE WITH ANXIOUS DISTRESS (HCC): Primary | ICD-10-CM

## 2025-02-05 DIAGNOSIS — F43.29 GRIEF REACTION WITH PROLONGED BEREAVEMENT: ICD-10-CM

## 2025-02-05 PROCEDURE — 1036F TOBACCO NON-USER: CPT | Performed by: COUNSELOR

## 2025-02-05 PROCEDURE — 1123F ACP DISCUSS/DSCN MKR DOCD: CPT | Performed by: COUNSELOR

## 2025-02-05 PROCEDURE — 90834 PSYTX W PT 45 MINUTES: CPT | Performed by: COUNSELOR

## 2025-02-05 SDOH — SOCIAL STABILITY - SOCIAL INSECURITY: PROBLEMS IN RELATIONSHIP WITH SPOUSE OR PARTNER: Z63.0

## 2025-02-05 ASSESSMENT — ANXIETY QUESTIONNAIRES
4. TROUBLE RELAXING: MORE THAN HALF THE DAYS
5. BEING SO RESTLESS THAT IT IS HARD TO SIT STILL: SEVERAL DAYS
3. WORRYING TOO MUCH ABOUT DIFFERENT THINGS: NEARLY EVERY DAY
2. NOT BEING ABLE TO STOP OR CONTROL WORRYING: NEARLY EVERY DAY
5. BEING SO RESTLESS THAT IT IS HARD TO SIT STILL: SEVERAL DAYS
IF YOU CHECKED OFF ANY PROBLEMS ON THIS QUESTIONNAIRE, HOW DIFFICULT HAVE THESE PROBLEMS MADE IT FOR YOU TO DO YOUR WORK, TAKE CARE OF THINGS AT HOME, OR GET ALONG WITH OTHER PEOPLE: SOMEWHAT DIFFICULT
3. WORRYING TOO MUCH ABOUT DIFFERENT THINGS: NEARLY EVERY DAY
6. BECOMING EASILY ANNOYED OR IRRITABLE: SEVERAL DAYS
2. NOT BEING ABLE TO STOP OR CONTROL WORRYING: NEARLY EVERY DAY
IF YOU CHECKED OFF ANY PROBLEMS ON THIS QUESTIONNAIRE, HOW DIFFICULT HAVE THESE PROBLEMS MADE IT FOR YOU TO DO YOUR WORK, TAKE CARE OF THINGS AT HOME, OR GET ALONG WITH OTHER PEOPLE: SOMEWHAT DIFFICULT
GAD7 TOTAL SCORE: 14
1. FEELING NERVOUS, ANXIOUS, OR ON EDGE: SEVERAL DAYS
7. FEELING AFRAID AS IF SOMETHING AWFUL MIGHT HAPPEN: NEARLY EVERY DAY
6. BECOMING EASILY ANNOYED OR IRRITABLE: SEVERAL DAYS
7. FEELING AFRAID AS IF SOMETHING AWFUL MIGHT HAPPEN: NEARLY EVERY DAY
1. FEELING NERVOUS, ANXIOUS, OR ON EDGE: SEVERAL DAYS
4. TROUBLE RELAXING: MORE THAN HALF THE DAYS

## 2025-02-05 ASSESSMENT — PATIENT HEALTH QUESTIONNAIRE - PHQ9
3. TROUBLE FALLING OR STAYING ASLEEP: NEARLY EVERY DAY
SUM OF ALL RESPONSES TO PHQ QUESTIONS 1-9: 17
7. TROUBLE CONCENTRATING ON THINGS, SUCH AS READING THE NEWSPAPER OR WATCHING TELEVISION: SEVERAL DAYS
8. MOVING OR SPEAKING SO SLOWLY THAT OTHER PEOPLE COULD HAVE NOTICED. OR THE OPPOSITE, BEING SO FIGETY OR RESTLESS THAT YOU HAVE BEEN MOVING AROUND A LOT MORE THAN USUAL: SEVERAL DAYS
4. FEELING TIRED OR HAVING LITTLE ENERGY: NEARLY EVERY DAY
10. IF YOU CHECKED OFF ANY PROBLEMS, HOW DIFFICULT HAVE THESE PROBLEMS MADE IT FOR YOU TO DO YOUR WORK, TAKE CARE OF THINGS AT HOME, OR GET ALONG WITH OTHER PEOPLE: SOMEWHAT DIFFICULT
5. POOR APPETITE OR OVEREATING: NEARLY EVERY DAY
SUM OF ALL RESPONSES TO PHQ QUESTIONS 1-9: 17
6. FEELING BAD ABOUT YOURSELF - OR THAT YOU ARE A FAILURE OR HAVE LET YOURSELF OR YOUR FAMILY DOWN: MORE THAN HALF THE DAYS
SUM OF ALL RESPONSES TO PHQ QUESTIONS 1-9: 17
2. FEELING DOWN, DEPRESSED OR HOPELESS: MORE THAN HALF THE DAYS
9. THOUGHTS THAT YOU WOULD BE BETTER OFF DEAD, OR OF HURTING YOURSELF: NOT AT ALL
7. TROUBLE CONCENTRATING ON THINGS, SUCH AS READING THE NEWSPAPER OR WATCHING TELEVISION: SEVERAL DAYS
6. FEELING BAD ABOUT YOURSELF - OR THAT YOU ARE A FAILURE OR HAVE LET YOURSELF OR YOUR FAMILY DOWN: MORE THAN HALF THE DAYS
1. LITTLE INTEREST OR PLEASURE IN DOING THINGS: MORE THAN HALF THE DAYS
2. FEELING DOWN, DEPRESSED OR HOPELESS: MORE THAN HALF THE DAYS
SUM OF ALL RESPONSES TO PHQ QUESTIONS 1-9: 17
SUM OF ALL RESPONSES TO PHQ9 QUESTIONS 1 & 2: 4
5. POOR APPETITE OR OVEREATING: NEARLY EVERY DAY
3. TROUBLE FALLING OR STAYING ASLEEP: NEARLY EVERY DAY
4. FEELING TIRED OR HAVING LITTLE ENERGY: NEARLY EVERY DAY
9. THOUGHTS THAT YOU WOULD BE BETTER OFF DEAD, OR OF HURTING YOURSELF: NOT AT ALL
10. IF YOU CHECKED OFF ANY PROBLEMS, HOW DIFFICULT HAVE THESE PROBLEMS MADE IT FOR YOU TO DO YOUR WORK, TAKE CARE OF THINGS AT HOME, OR GET ALONG WITH OTHER PEOPLE: SOMEWHAT DIFFICULT
8. MOVING OR SPEAKING SO SLOWLY THAT OTHER PEOPLE COULD HAVE NOTICED. OR THE OPPOSITE - BEING SO FIDGETY OR RESTLESS THAT YOU HAVE BEEN MOVING AROUND A LOT MORE THAN USUAL: SEVERAL DAYS
SUM OF ALL RESPONSES TO PHQ QUESTIONS 1-9: 17
1. LITTLE INTEREST OR PLEASURE IN DOING THINGS: MORE THAN HALF THE DAYS

## 2025-02-05 NOTE — PROGRESS NOTES
ADULT BEHAVIORAL HEALTH FOLLOW UP  Kim Almanza, was evaluated through a synchronous (real-time) audio-video encounter. The patient (or guardian if applicable) is aware that this is a billable service, which includes applicable co-pays. This Virtual Visit was conducted with patient's (and/or legal guardian's) consent. Patient identification was verified, and a caregiver was present when appropriate.   The patient was located at Home: [address] [place] [id number]  Provider was located at Facility (Appt Dept): [facility] [address]  [address]  [place] [address]  Confirm you are appropriately licensed, registered, or certified to deliver care in the state where the patient is located as indicated above. If you are not or unsure, please re-schedule the visit: Yes, I confirm.        Total time spent for this encounter:  50    --STEVIE Beatty-S on 2/5/2025 at 3:19 PM    An electronic signature was used to authenticate this note.      Visit Date: 2/5/2025   Time of appointment:  2:00 pm    Time spent with Patient: 50 minutes.   This is patient's second appointment.    Reason for Consult:    Chief Complaint   Patient presents with    Depression    Stress       Referring Provider/PCP:    No ref. provider found  Maria G Hutton, APRN - CNP      Pt provided informed consent for the behavioral health program. Discussed with patient model of service to include the limits of confidentiality (i.e. abuse reporting, suicide intervention, etc.) and short-term intervention focused approach.  Pt indicated understanding.    SUBJECTIVE  Kim is a 69 y.o. female who presents for follow up of depression, grief.  She reported \"really missing my daughter\" who lives near [place], [place] and sees a few times a year.  Kim reports she visits her marital home about once a month to check on the house and make some repairs that she can do on her own as she would like to sell it.  She identifies

## 2025-02-10 ENCOUNTER — PATIENT MESSAGE (OUTPATIENT)
Dept: FAMILY MEDICINE CLINIC | Age: 70
End: 2025-02-10

## 2025-02-10 DIAGNOSIS — F51.04 PSYCHOPHYSIOLOGICAL INSOMNIA: ICD-10-CM

## 2025-02-10 DIAGNOSIS — F33.1 MODERATE EPISODE OF RECURRENT MAJOR DEPRESSIVE DISORDER (HCC): ICD-10-CM

## 2025-02-10 RX ORDER — AMITRIPTYLINE HYDROCHLORIDE 10 MG/1
10 TABLET ORAL NIGHTLY
Qty: 90 TABLET | Refills: 2 | Status: SHIPPED | OUTPATIENT
Start: 2025-02-10

## 2025-02-18 ENCOUNTER — TELEPHONE (OUTPATIENT)
Age: 70
End: 2025-02-18

## 2025-02-18 NOTE — TELEPHONE ENCOUNTER
Kim Almanza was contacted by Asha Bradford MA, a Community Health Navigator, regarding a Social Determinants of Health referral.     A message was left with the writer's contact information.    Follow-up attempt.

## 2025-02-19 NOTE — TELEPHONE ENCOUNTER
Kim Almanza was contacted by a Community Health Navigator to discuss a referral for SDOH related needs.     Writer spoke with: Patient and explained the services and assistance that can be provided by a Community Health Navigator.     Patient agreeable to receiving resources and support from writer.     Intake Notes: Writer spoke with patient regarding current needs. Patient stated she recently submitted her interim report for continued SNAP benefits, Patient is waiting for response. Writer sent food resources via email per patient request. Patient applied for [employer] for utility assistance, she is waiting for approval. Patient was given two month extension for gas bill until application is approved. Patient called two charities for assistance with rent for the month, waiting for callback.  Patient has explored all available resources and has no further needs at this time.    Actions to be completed by writer: Close referral.    Actions to be completed by patient: Close referral.      Asha Bradford MA

## 2025-02-26 ENCOUNTER — HOSPITAL ENCOUNTER (OUTPATIENT)
Facility: CLINIC | Age: 70
Discharge: HOME | End: 2025-02-26
Payer: MEDICARE

## 2025-02-26 ENCOUNTER — TELEMEDICINE (OUTPATIENT)
Dept: BEHAVIORAL/MENTAL HEALTH CLINIC | Age: 70
End: 2025-02-26

## 2025-02-26 DIAGNOSIS — E53.8 FOLATE DEFICIENCY: ICD-10-CM

## 2025-02-26 DIAGNOSIS — J43.9 PULMONARY EMPHYSEMA, UNSPECIFIED EMPHYSEMA TYPE (HCC): ICD-10-CM

## 2025-02-26 DIAGNOSIS — Z13.6 SCREENING FOR CARDIOVASCULAR CONDITION: ICD-10-CM

## 2025-02-26 DIAGNOSIS — M81.0 OSTEOPOROSIS WITHOUT CURRENT PATHOLOGICAL FRACTURE, UNSPECIFIED OSTEOPOROSIS TYPE: ICD-10-CM

## 2025-02-26 DIAGNOSIS — Z51.81 MEDICATION MONITORING ENCOUNTER: ICD-10-CM

## 2025-02-26 DIAGNOSIS — Z63.0 PROBLEMS IN RELATIONSHIP WITH SPOUSE OR PARTNER: ICD-10-CM

## 2025-02-26 DIAGNOSIS — F33.1 MAJOR DEPRESSIVE DISORDER, RECURRENT EPISODE, MODERATE WITH ANXIOUS DISTRESS (HCC): Primary | ICD-10-CM

## 2025-02-26 DIAGNOSIS — E78.2 MIXED HYPERLIPIDEMIA: ICD-10-CM

## 2025-02-26 DIAGNOSIS — R73.03 PREDIABETES: ICD-10-CM

## 2025-02-26 DIAGNOSIS — Z87.891 PERSONAL HISTORY OF TOBACCO USE: ICD-10-CM

## 2025-02-26 DIAGNOSIS — E55.9 VITAMIN D DEFICIENCY: ICD-10-CM

## 2025-02-26 DIAGNOSIS — E05.90 SUBCLINICAL HYPERTHYROIDISM: ICD-10-CM

## 2025-02-26 LAB
25(OH)D3 SERPL-MCNC: 66.7 NG/ML (ref 30–100)
ALBUMIN SERPL-MCNC: 4.5 G/DL (ref 3.5–5.2)
ALBUMIN/GLOB SERPL: 1.9 {RATIO} (ref 1–2.5)
ALP SERPL-CCNC: 55 U/L (ref 35–104)
ALT SERPL-CCNC: 19 U/L (ref 10–35)
ANION GAP SERPL CALCULATED.3IONS-SCNC: 11 MMOL/L (ref 9–16)
AST SERPL-CCNC: 26 U/L (ref 10–35)
BILIRUB SERPL-MCNC: 0.5 MG/DL (ref 0–1.2)
BUN SERPL-MCNC: 10 MG/DL (ref 8–23)
CALCIUM SERPL-MCNC: 8.9 MG/DL (ref 8.6–10.4)
CHLORIDE SERPL-SCNC: 109 MMOL/L (ref 98–107)
CHOLEST SERPL-MCNC: 254 MG/DL (ref 0–199)
CHOLESTEROL/HDL RATIO: 7.1
CO2 SERPL-SCNC: 24 MMOL/L (ref 20–31)
CREAT SERPL-MCNC: 0.8 MG/DL (ref 0.6–0.9)
ERYTHROCYTE [DISTWIDTH] IN BLOOD BY AUTOMATED COUNT: 12.8 % (ref 11.8–14.4)
EST. AVERAGE GLUCOSE BLD GHB EST-MCNC: 103 MG/DL
FOLATE SERPL-MCNC: 19.2 NG/ML (ref 4.8–24.2)
GFR, ESTIMATED: 81 ML/MIN/1.73M2
GLUCOSE P FAST SERPL-MCNC: 94 MG/DL (ref 74–99)
HBA1C MFR BLD: 5.2 % (ref 4–6)
HCT VFR BLD AUTO: 41.6 % (ref 36.3–47.1)
HDLC SERPL-MCNC: 36 MG/DL
HGB BLD-MCNC: 13.1 G/DL (ref 11.9–15.1)
LDLC SERPL CALC-MCNC: 181 MG/DL (ref 0–100)
MCH RBC QN AUTO: 28.8 PG (ref 25.2–33.5)
MCHC RBC AUTO-ENTMCNC: 31.5 G/DL (ref 28.4–34.8)
MCV RBC AUTO: 91.4 FL (ref 82.6–102.9)
NRBC BLD-RTO: 0 PER 100 WBC
PLATELET # BLD AUTO: 262 K/UL (ref 138–453)
PMV BLD AUTO: 11.4 FL (ref 8.1–13.5)
POTASSIUM SERPL-SCNC: 3.8 MMOL/L (ref 3.7–5.3)
PROT SERPL-MCNC: 6.9 G/DL (ref 6.6–8.7)
RBC # BLD AUTO: 4.55 M/UL (ref 3.95–5.11)
SODIUM SERPL-SCNC: 144 MMOL/L (ref 136–145)
T4 FREE SERPL-MCNC: 0.9 NG/DL (ref 0.9–1.7)
TRIGL SERPL-MCNC: 186 MG/DL
TSH SERPL DL<=0.05 MIU/L-ACNC: 0.08 UIU/ML (ref 0.27–4.2)
VIT B12 SERPL-MCNC: 880 PG/ML (ref 232–1245)
VLDLC SERPL CALC-MCNC: 37 MG/DL (ref 1–30)
WBC OTHER # BLD: 5.4 K/UL (ref 3.5–11.3)

## 2025-02-26 PROCEDURE — 85027 COMPLETE CBC AUTOMATED: CPT

## 2025-02-26 PROCEDURE — 83036 HEMOGLOBIN GLYCOSYLATED A1C: CPT

## 2025-02-26 PROCEDURE — 82746 ASSAY OF FOLIC ACID SERUM: CPT

## 2025-02-26 PROCEDURE — 84443 ASSAY THYROID STIM HORMONE: CPT

## 2025-02-26 PROCEDURE — 82607 VITAMIN B-12: CPT

## 2025-02-26 PROCEDURE — 84439 ASSAY OF FREE THYROXINE: CPT

## 2025-02-26 PROCEDURE — 80061 LIPID PANEL: CPT

## 2025-02-26 PROCEDURE — 82306 VITAMIN D 25 HYDROXY: CPT

## 2025-02-26 PROCEDURE — 36415 COLL VENOUS BLD VENIPUNCTURE: CPT

## 2025-02-26 PROCEDURE — 80053 COMPREHEN METABOLIC PANEL: CPT

## 2025-02-26 SDOH — SOCIAL STABILITY - SOCIAL INSECURITY: PROBLEMS IN RELATIONSHIP WITH SPOUSE OR PARTNER: Z63.0

## 2025-02-26 ASSESSMENT — PATIENT HEALTH QUESTIONNAIRE - PHQ9
6. FEELING BAD ABOUT YOURSELF - OR THAT YOU ARE A FAILURE OR HAVE LET YOURSELF OR YOUR FAMILY DOWN: MORE THAN HALF THE DAYS
SUM OF ALL RESPONSES TO PHQ QUESTIONS 1-9: 16
1. LITTLE INTEREST OR PLEASURE IN DOING THINGS: MORE THAN HALF THE DAYS
9. THOUGHTS THAT YOU WOULD BE BETTER OFF DEAD, OR OF HURTING YOURSELF: NOT AT ALL
4. FEELING TIRED OR HAVING LITTLE ENERGY: NEARLY EVERY DAY
4. FEELING TIRED OR HAVING LITTLE ENERGY: NEARLY EVERY DAY
6. FEELING BAD ABOUT YOURSELF - OR THAT YOU ARE A FAILURE OR HAVE LET YOURSELF OR YOUR FAMILY DOWN: MORE THAN HALF THE DAYS
8. MOVING OR SPEAKING SO SLOWLY THAT OTHER PEOPLE COULD HAVE NOTICED. OR THE OPPOSITE, BEING SO FIGETY OR RESTLESS THAT YOU HAVE BEEN MOVING AROUND A LOT MORE THAN USUAL: SEVERAL DAYS
2. FEELING DOWN, DEPRESSED OR HOPELESS: MORE THAN HALF THE DAYS
SUM OF ALL RESPONSES TO PHQ QUESTIONS 1-9: 16
2. FEELING DOWN, DEPRESSED OR HOPELESS: MORE THAN HALF THE DAYS
7. TROUBLE CONCENTRATING ON THINGS, SUCH AS READING THE NEWSPAPER OR WATCHING TELEVISION: SEVERAL DAYS
3. TROUBLE FALLING OR STAYING ASLEEP: NEARLY EVERY DAY
SUM OF ALL RESPONSES TO PHQ QUESTIONS 1-9: 16
SUM OF ALL RESPONSES TO PHQ QUESTIONS 1-9: 16
3. TROUBLE FALLING OR STAYING ASLEEP: NEARLY EVERY DAY
10. IF YOU CHECKED OFF ANY PROBLEMS, HOW DIFFICULT HAVE THESE PROBLEMS MADE IT FOR YOU TO DO YOUR WORK, TAKE CARE OF THINGS AT HOME, OR GET ALONG WITH OTHER PEOPLE: SOMEWHAT DIFFICULT
1. LITTLE INTEREST OR PLEASURE IN DOING THINGS: MORE THAN HALF THE DAYS
SUM OF ALL RESPONSES TO PHQ9 QUESTIONS 1 & 2: 4
5. POOR APPETITE OR OVEREATING: MORE THAN HALF THE DAYS
7. TROUBLE CONCENTRATING ON THINGS, SUCH AS READING THE NEWSPAPER OR WATCHING TELEVISION: SEVERAL DAYS
9. THOUGHTS THAT YOU WOULD BE BETTER OFF DEAD, OR OF HURTING YOURSELF: NOT AT ALL
10. IF YOU CHECKED OFF ANY PROBLEMS, HOW DIFFICULT HAVE THESE PROBLEMS MADE IT FOR YOU TO DO YOUR WORK, TAKE CARE OF THINGS AT HOME, OR GET ALONG WITH OTHER PEOPLE: SOMEWHAT DIFFICULT
8. MOVING OR SPEAKING SO SLOWLY THAT OTHER PEOPLE COULD HAVE NOTICED. OR THE OPPOSITE - BEING SO FIDGETY OR RESTLESS THAT YOU HAVE BEEN MOVING AROUND A LOT MORE THAN USUAL: SEVERAL DAYS
SUM OF ALL RESPONSES TO PHQ QUESTIONS 1-9: 16
5. POOR APPETITE OR OVEREATING: MORE THAN HALF THE DAYS

## 2025-02-26 ASSESSMENT — ANXIETY QUESTIONNAIRES
GAD7 TOTAL SCORE: 15
3. WORRYING TOO MUCH ABOUT DIFFERENT THINGS: NEARLY EVERY DAY
5. BEING SO RESTLESS THAT IT IS HARD TO SIT STILL: SEVERAL DAYS
IF YOU CHECKED OFF ANY PROBLEMS ON THIS QUESTIONNAIRE, HOW DIFFICULT HAVE THESE PROBLEMS MADE IT FOR YOU TO DO YOUR WORK, TAKE CARE OF THINGS AT HOME, OR GET ALONG WITH OTHER PEOPLE: SOMEWHAT DIFFICULT
7. FEELING AFRAID AS IF SOMETHING AWFUL MIGHT HAPPEN: NEARLY EVERY DAY
3. WORRYING TOO MUCH ABOUT DIFFERENT THINGS: NEARLY EVERY DAY
2. NOT BEING ABLE TO STOP OR CONTROL WORRYING: NEARLY EVERY DAY
6. BECOMING EASILY ANNOYED OR IRRITABLE: NEARLY EVERY DAY
6. BECOMING EASILY ANNOYED OR IRRITABLE: NEARLY EVERY DAY
IF YOU CHECKED OFF ANY PROBLEMS ON THIS QUESTIONNAIRE, HOW DIFFICULT HAVE THESE PROBLEMS MADE IT FOR YOU TO DO YOUR WORK, TAKE CARE OF THINGS AT HOME, OR GET ALONG WITH OTHER PEOPLE: SOMEWHAT DIFFICULT
4. TROUBLE RELAXING: SEVERAL DAYS
7. FEELING AFRAID AS IF SOMETHING AWFUL MIGHT HAPPEN: NEARLY EVERY DAY
1. FEELING NERVOUS, ANXIOUS, OR ON EDGE: SEVERAL DAYS
5. BEING SO RESTLESS THAT IT IS HARD TO SIT STILL: SEVERAL DAYS
1. FEELING NERVOUS, ANXIOUS, OR ON EDGE: SEVERAL DAYS
2. NOT BEING ABLE TO STOP OR CONTROL WORRYING: NEARLY EVERY DAY
4. TROUBLE RELAXING: SEVERAL DAYS

## 2025-02-26 NOTE — PROGRESS NOTES
ADULT BEHAVIORAL HEALTH FOLLOW UP  Kim Almanza, was evaluated through a synchronous (real-time) audio-video encounter. The patient (or guardian if applicable) is aware that this is a billable service, which includes applicable co-pays. This Virtual Visit was conducted with patient's (and/or legal guardian's) consent. Patient identification was verified, and a caregiver was present when appropriate.   The patient was located at Home: [address] [place] [id number]  Provider was located at Facility (Appt Dept): [address] [place] [address]  [address]  [place] [address]  Confirm you are appropriately licensed, registered, or certified to deliver care in the state where the patient is located as indicated above. If you are not or unsure, please re-schedule the visit: Yes, I confirm.        Total time spent for this encounter:  53 minutes    --STEVIE Beatty-S on 2/27/2025 at 12:35 PM    An electronic signature was used to authenticate this note.      Visit Date: 2/27/2025   Time of appointment:  4:00 pm    Time spent with Patient: 53 minutes.   This is patient's third appointment.    Reason for Consult:    Chief Complaint   Patient presents with    Anxiety    Depression       Referring Provider/PCP:    No ref. provider found  Maria G Hutton, SOFIA - CNP      Pt provided informed consent for the behavioral health program. Discussed with patient model of service to include the limits of confidentiality (i.e. abuse reporting, suicide intervention, etc.) and short-term intervention focused approach.  Pt indicated understanding.    SUBJECTIVE  Kim is a 69 y.o. female who presents for follow up of depression noting she is starting to feel a \"little better\" but still has many days where she feels depressed. Kim reported that she did call  but is awaiting a call back.  She reports wanting to get Power of  from her  so she can put the utilities in her name and

## 2025-03-09 ENCOUNTER — PATIENT MESSAGE (OUTPATIENT)
Dept: FAMILY MEDICINE CLINIC | Age: 70
End: 2025-03-09

## 2025-03-09 DIAGNOSIS — I87.2 VENOUS INSUFFICIENCY: Primary | ICD-10-CM

## 2025-03-09 DIAGNOSIS — R20.8 BURNING SENSATION OF FEET: ICD-10-CM

## 2025-03-12 ENCOUNTER — HOSPITAL ENCOUNTER (OUTPATIENT)
Dept: MAMMOGRAPHY | Age: 70
Discharge: HOME | End: 2025-03-14
Payer: MEDICARE

## 2025-03-12 VITALS — HEIGHT: 63 IN | WEIGHT: 134 LBS | BODY MASS INDEX: 23.74 KG/M2

## 2025-03-12 DIAGNOSIS — Z12.31 VISIT FOR SCREENING MAMMOGRAM: ICD-10-CM

## 2025-03-12 PROCEDURE — 77063 BREAST TOMOSYNTHESIS BI: CPT

## 2025-03-17 ENCOUNTER — RESULTS FOLLOW-UP (OUTPATIENT)
Dept: MAMMOGRAPHY | Age: 70
End: 2025-03-17

## 2025-03-17 RX ORDER — DENOSUMAB 60 MG/ML
INJECTION SUBCUTANEOUS
Qty: 1 ML | Refills: 0 | Status: SHIPPED | OUTPATIENT
Start: 2025-03-17

## 2025-03-17 NOTE — TELEPHONE ENCOUNTER
Kmi Almanza is calling to request a refill on the following medication(s):    Last Visit Date (If Applicable):  1/22/2025    Next Visit Date:    1/28/2026    Medication Request:  Requested Prescriptions     Pending Prescriptions Disp Refills    denosumab (PROLIA) 60 MG/ML SOSY SC injection [Pharmacy Med Name: Prolia 60 MG/ML Subcutaneous Solution Prefilled Syringe] 1 mL 0     Sig: INJECT 60MG SUBCUTANEOUSLY EVERY 6 MONTHS REFRIGERATE, USE WITHIN 14 DAYS ONCE AT ROOM TEMPERATURE

## 2025-03-19 ENCOUNTER — RESULTS FOLLOW-UP (OUTPATIENT)
Dept: CT IMAGING | Age: 70
End: 2025-03-19

## 2025-03-19 ENCOUNTER — HOSPITAL ENCOUNTER (OUTPATIENT)
Dept: CT IMAGING | Age: 70
Discharge: HOME | End: 2025-03-21
Payer: MEDICARE

## 2025-03-19 VITALS — WEIGHT: 134 LBS | HEIGHT: 64 IN | BODY MASS INDEX: 22.88 KG/M2

## 2025-03-19 DIAGNOSIS — Z87.891 PERSONAL HISTORY OF TOBACCO USE: ICD-10-CM

## 2025-03-19 PROCEDURE — 71271 CT THORAX LUNG CANCER SCR C-: CPT

## 2025-06-11 ENCOUNTER — OFFICE VISIT (OUTPATIENT)
Dept: FAMILY MEDICINE CLINIC | Age: 70
End: 2025-06-11

## 2025-06-11 VITALS
DIASTOLIC BLOOD PRESSURE: 75 MMHG | HEIGHT: 64 IN | HEART RATE: 81 BPM | BODY MASS INDEX: 22.88 KG/M2 | SYSTOLIC BLOOD PRESSURE: 112 MMHG | WEIGHT: 134 LBS

## 2025-06-11 DIAGNOSIS — M81.0 AGE-RELATED OSTEOPOROSIS WITHOUT CURRENT PATHOLOGICAL FRACTURE: Primary | ICD-10-CM

## 2025-06-11 DIAGNOSIS — D36.7 DERMOID CYST OF NECK: ICD-10-CM

## 2025-06-11 DIAGNOSIS — L98.9 SKIN LESION: ICD-10-CM

## 2025-06-11 RX ORDER — DENOSUMAB 60 MG/ML
60 INJECTION SUBCUTANEOUS ONCE
Qty: 1 ML | Refills: 0 | Status: SHIPPED | OUTPATIENT
Start: 2025-06-11 | End: 2025-06-11

## 2025-06-11 ASSESSMENT — PATIENT HEALTH QUESTIONNAIRE - PHQ9
SUM OF ALL RESPONSES TO PHQ QUESTIONS 1-9: 0
2. FEELING DOWN, DEPRESSED OR HOPELESS: NOT AT ALL
SUM OF ALL RESPONSES TO PHQ QUESTIONS 1-9: 0
1. LITTLE INTEREST OR PLEASURE IN DOING THINGS: NOT AT ALL
SUM OF ALL RESPONSES TO PHQ QUESTIONS 1-9: 0
SUM OF ALL RESPONSES TO PHQ QUESTIONS 1-9: 0

## 2025-06-11 ASSESSMENT — ENCOUNTER SYMPTOMS
CHEST TIGHTNESS: 0
SHORTNESS OF BREATH: 0
COLOR CHANGE: 0
ABDOMINAL PAIN: 0
CONSTIPATION: 0
DIARRHEA: 0

## 2025-06-11 NOTE — PROGRESS NOTES
Kim Almanza is a 69 y.o. female who presents in office today with Self medication specific follow up    Chief Complaint   Patient presents with    Injections     Pi is due for Prolia injection     Mass     Lump on back of neck    Medication Refill     Needs new order for handicap placard.         History of Present Illness:     HPI    Here for every 6 month Prolia injection. Tolerated last injection well without side effects.  Last DEXA scan done 3/7/2024.    Mass on right posterior neck.  Previously removed by provider in [place] in 2019 but has come back.  Advised at that time was some type of cyst.  It has become bothersome as it has grown.    Also has skin lesion on left side of neck, which she feels has grown.  Has not had dermatology evaluation.        Care gaps:   Colon CA screening:  Cologuard positive 23, referred to GI  Vaccines:   UTD  Hepatitis C/HIV screens:  Negative  Breast CA screening:  3/7/2024 negative  CT Lung Screening:    due Oct 20 - order placed  OB/GYN, cervical CA screening:     DEXA:   2021 osteoporosis --  3/7/2024  Depression Screenin --> 4 --> 0 --> 8 --> 6    Health Maintenance Due   Topic Date Due    COVID-19 Vaccine (2024- season) 2025        Patient Care Team:  Maria G Hutton APRN - CNP as PCP - General (Nurse Practitioner)  Maria G Hutton APRN - CNP as PCP - Empaneled Provider    Reviewed     [x] Past Medical, Family, and Social History was reviewed per writer and does contribute to the patient presenting condition.    [x] Laboratory Results, Vital signs, Imaging, Active Problems, Immunizations, Current/Recently Discontinued Medications, Health Maintenance Activities Due, Referral Notes (if available) were reviewed per writer     [x] Reviewed Depression screening if taken or valid today or any other valid screening tool (others seen below) Interpretation of Total Score DepressionSeverity: 1-4 = Minimal depression, 5-9 = Mild depression,

## 2025-07-09 DIAGNOSIS — I10 PRIMARY HYPERTENSION: ICD-10-CM

## 2025-07-09 RX ORDER — AMLODIPINE BESYLATE 5 MG/1
5 TABLET ORAL DAILY
Qty: 100 TABLET | Refills: 2 | Status: SHIPPED | OUTPATIENT
Start: 2025-07-09